# Patient Record
Sex: MALE | Race: WHITE | Employment: OTHER | URBAN - METROPOLITAN AREA
[De-identification: names, ages, dates, MRNs, and addresses within clinical notes are randomized per-mention and may not be internally consistent; named-entity substitution may affect disease eponyms.]

---

## 2020-02-21 ENCOUNTER — HOSPITAL ENCOUNTER (OUTPATIENT)
Age: 70
Discharge: HOME | End: 2020-02-23
Payer: MEDICARE

## 2020-02-21 ENCOUNTER — HOSPITAL ENCOUNTER (OUTPATIENT)
Dept: GENERAL RADIOLOGY | Age: 70
Discharge: HOME | End: 2020-02-23
Payer: MEDICARE

## 2020-02-21 ENCOUNTER — OFFICE VISIT (OUTPATIENT)
Dept: PULMONOLOGY | Age: 70
End: 2020-02-21
Payer: MEDICARE

## 2020-02-21 VITALS
SYSTOLIC BLOOD PRESSURE: 145 MMHG | DIASTOLIC BLOOD PRESSURE: 87 MMHG | WEIGHT: 185 LBS | BODY MASS INDEX: 27.4 KG/M2 | RESPIRATION RATE: 18 BRPM | HEART RATE: 62 BPM | HEIGHT: 69 IN | OXYGEN SATURATION: 97 %

## 2020-02-21 PROCEDURE — 1036F TOBACCO NON-USER: CPT | Performed by: INTERNAL MEDICINE

## 2020-02-21 PROCEDURE — 4040F PNEUMOC VAC/ADMIN/RCVD: CPT | Performed by: INTERNAL MEDICINE

## 2020-02-21 PROCEDURE — 94726 PLETHYSMOGRAPHY LUNG VOLUMES: CPT | Performed by: INTERNAL MEDICINE

## 2020-02-21 PROCEDURE — G8484 FLU IMMUNIZE NO ADMIN: HCPCS | Performed by: INTERNAL MEDICINE

## 2020-02-21 PROCEDURE — 71046 X-RAY EXAM CHEST 2 VIEWS: CPT

## 2020-02-21 PROCEDURE — 94375 RESPIRATORY FLOW VOLUME LOOP: CPT | Performed by: INTERNAL MEDICINE

## 2020-02-21 PROCEDURE — 1123F ACP DISCUSS/DSCN MKR DOCD: CPT | Performed by: INTERNAL MEDICINE

## 2020-02-21 PROCEDURE — 94729 DIFFUSING CAPACITY: CPT | Performed by: INTERNAL MEDICINE

## 2020-02-21 PROCEDURE — G8417 CALC BMI ABV UP PARAM F/U: HCPCS | Performed by: INTERNAL MEDICINE

## 2020-02-21 PROCEDURE — G8427 DOCREV CUR MEDS BY ELIG CLIN: HCPCS | Performed by: INTERNAL MEDICINE

## 2020-02-21 PROCEDURE — 99204 OFFICE O/P NEW MOD 45 MIN: CPT | Performed by: INTERNAL MEDICINE

## 2020-02-21 PROCEDURE — 3017F COLORECTAL CA SCREEN DOC REV: CPT | Performed by: INTERNAL MEDICINE

## 2020-02-21 RX ORDER — LEVOTHYROXINE SODIUM 0.15 MG/1
150 TABLET ORAL DAILY
COMMUNITY

## 2020-02-21 RX ORDER — DESMOPRESSIN ACETATE 0.1 MG/1
0.1 TABLET ORAL DAILY
COMMUNITY

## 2020-02-21 RX ORDER — PREDNISONE 2.5 MG
2.5 TABLET ORAL DAILY
COMMUNITY

## 2020-02-21 RX ORDER — BUDESONIDE AND FORMOTEROL FUMARATE DIHYDRATE 160; 4.5 UG/1; UG/1
2 AEROSOL RESPIRATORY (INHALATION) 2 TIMES DAILY
Qty: 1 INHALER | Refills: 3 | Status: SHIPPED | OUTPATIENT
Start: 2020-02-21 | End: 2020-06-01

## 2020-02-21 RX ORDER — ALBUTEROL SULFATE 90 UG/1
2 AEROSOL, METERED RESPIRATORY (INHALATION) EVERY 6 HOURS PRN
COMMUNITY

## 2020-02-21 RX ORDER — TESTOSTERONE CYPIONATE 200 MG/ML
VIAL (ML) INTRAMUSCULAR
COMMUNITY

## 2020-02-21 RX ORDER — AMLODIPINE BESYLATE 5 MG/1
5 TABLET ORAL DAILY
COMMUNITY

## 2020-02-21 ASSESSMENT — SLEEP AND FATIGUE QUESTIONNAIRES
HOW LIKELY ARE YOU TO NOD OFF OR FALL ASLEEP WHILE LYING DOWN TO REST IN THE AFTERNOON WHEN CIRCUMSTANCES PERMIT: 3
HOW LIKELY ARE YOU TO NOD OFF OR FALL ASLEEP WHILE SITTING QUIETLY AFTER LUNCH WITHOUT ALCOHOL: 2
HOW LIKELY ARE YOU TO NOD OFF OR FALL ASLEEP WHILE SITTING INACTIVE IN A PUBLIC PLACE: 0
HOW LIKELY ARE YOU TO NOD OFF OR FALL ASLEEP WHILE SITTING AND READING: 3
HOW LIKELY ARE YOU TO NOD OFF OR FALL ASLEEP WHILE SITTING AND TALKING TO SOMEONE: 0
HOW LIKELY ARE YOU TO NOD OFF OR FALL ASLEEP WHEN YOU ARE A PASSENGER IN A CAR FOR AN HOUR WITHOUT A BREAK: 3
HOW LIKELY ARE YOU TO NOD OFF OR FALL ASLEEP IN A CAR, WHILE STOPPED FOR A FEW MINUTES IN TRAFFIC: 0
HOW LIKELY ARE YOU TO NOD OFF OR FALL ASLEEP WHILE WATCHING TV: 3
ESS TOTAL SCORE: 14

## 2020-02-21 NOTE — PROGRESS NOTES
Good patient effort and understanding. No post testing was needed. Transcutaneous Hemoblobin is 15 .

## 2020-02-21 NOTE — PROGRESS NOTES
Michael Pineda REASON FOR THE CONSULTATION:  Dyspnea, bronchial asthma  HISTORY OF PRESENT ILLNESS:    Shanika Chauhan is a 77y.o. year old male here for evaluation of dyspnea. He has been experiencing this for the last few months. He has noted that her dyspnea is more during the winter rather than the summer. He generally last for about 4 months of the winter weather. He generally has not noted much wheezing. He does feel congested however. He does feel tightness in the chest.  He has had a complete cardiac evaluation which failed to reveal any cardiac dysfunction. He is not known to have any hypertension heart disease diabetes thyroid dysfunction or stroke. He is overweight however. He also has clinical features of sleep apnea syndrome in the form of snoring, excessive daytime sleepiness, being morbidly obese. He has been taking albuterol but he has not used any other inhalers. He denies any history of frequent chest colds and pneumonias. Has not noted any pedal edema or thromboembolic process. He has been under care of a physician in [facility] but his problem continue to persist and out of frustration come down to see me in [facility]. Patient does not remember having her asthma-like syndrome as a youngster. He has been a non-smoker all his life. No drug abuse. No occupational exposure of any kind. Never had a stroke. He has had history of hypothyroidism and is on replacement therapy      LUNG CANCER SCREENING     1. CRITERIA MET    []     CT ORDERED  []      2. CRITERIA NOT MET   [x]      3. REFUSED                    []    Non-smoker  REASON CRITERIA NOT MET     1. SMOKING LESS THAN 30 PY  []      2. AGE LESS THAN 55 or GREATER 77 YEARS  []      3. QUIT SMOKING 15 YEARS OR GREATER   []      4. RECENT CT WITH IN 11 MONTHS    []      5. LIFE EXPECTANCY < 5 YEARS   []      6. SIGNS  AND SYMPTOMS OF LUNG CANCER   []         Immunization     There is no immunization history on file for this patient. 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 No pertinent family history. SURGICAL HISTORY:   History reviewed. No pertinent surgical history. SOCIAL AND OCCUPATIONAL HEALTH:      There is no history of TB or TB exposure. There is no asbestos or silica dust exposure. The patient reports is no coal, foundry, quarry or [employer] exposure. Travel history reveals negative  There is no history of recreational or IV drug use. There  There is no history of recreational or IV drug use. He is no hot tube exposure. Pets absent    Occupational history no significant occupational history he is retired    TOBACCO:   reports that he has never smoked. He has never used smokeless tobacco.  ETOH:   has no history on file for alcohol. ALLERGIES:      No Known Allergies      Home Meds:   Prior to Admission medications    Medication Sig Start Date End Date Taking?  Authorizing Provider   amLODIPine (NORVASC) 5 MG tablet Take 5 mg by mouth daily   Yes Historical Provider, MD   desmopressin (DDAVP) 0.1 MG tablet Take 0.1 mg by mouth daily   Yes Historical Provider, MD   levothyroxine (SYNTHROID) 150 MCG tablet Take 150 mcg by mouth Daily   Yes Historical Provider, MD   predniSONE (DELTASONE) 2.5 MG tablet Take 2.5 mg by mouth daily   Yes Historical Provider, MD   Testosterone Cypionate 200 MG/ML SOLN Inject as directed   Yes Historical Provider, MD   albuterol sulfate  (90 Base) MCG/ACT inhaler Inhale 2 puffs into the lungs every 6 hours as needed for Wheezing   Yes Historical Provider, MD budesonide-formoterol (SYMBICORT) 160-4.5 MCG/ACT AERO Inhale 2 puffs into the lungs 2 times daily 2/21/20  Yes Trevin Hay MD              REVIEW OF SYSTEMS:    CONSTITUTIONAL:  negative for  fevers, chills, sweats, fatigue, malaise, anorexia and weight loss she is morbidly obese  EYES:  negative for  double vision, blurred vision, dry eyes, eye discharge and redness no redness no dryness  HEENT:  negative for  hearing loss, tinnitus, ear drainage, earaches and nasal congestion no nasal stuffiness or sinusitis  RESPIRATORY:  See hpi  CARDIOVASCULAR:  negative for  chest pain,, palpitations, orthopnea, PND no hypertension  GASTROINTESTINAL:  negative for nausea, vomiting, change in bowel habits, diarrhea, constipation, abdominal pain, pruritus, abdominal mass and abdominal distention no gastric ulcer or hematemesis or melena  GENITOURINARY:  negative for frequency, dysuria, nocturia, urinary incontinence and hesitancy no prostatic complaints  INTEGUMENT  negative for rash, skin lesion(s), dryness, skin color change, changes in lesion, pruritus and changes in hair  HEMATOLOGIC/LYMPHATIC:  negative for easy bruising, bleeding, lymphadenopathy, petechiae and swelling/edema no enlarged lymph nodes  ALLERGIC/IMMUNOLOGIC:  negative for recurrent infections, urticaria and drug reactions no rash  ENDOCRINE:  negative for heat intolerance, cold intolerance, tremor, weight changes and change in bowel habits completed and negative no diabetes  MUSCULOSKELETAL:  negative for  myalgias, arthralgias, pain, joint swelling, stiff joints and decreased range of motion no joint swelling  NEUROLOGICAL:  negative for headaches, dizziness, seizures, memory problems, speech problems, visual disturbance and coordination problems no motor or sensory deficit  BEHAVIOR/PSYCH:  negative for poor appetite, increased appetite, decreased sleep, increased sleep, decreased energy level, increased energy level and poor concentration completed swelling or tenderness        CBC: No results for input(s): WBC, HGB, PLT in the last 72 hours. BMP:  No results for input(s): NA, K, CL, CO2, BUN, CREATININE, GLUCOSE in the last 72 hours. Hepatic: No results for input(s): AST, ALT, ALB, BILITOT, ALKPHOS in the last 72 hours. Amylase: No results found for: AMYLASE  Lipase: No results found for: LIPASE  Troponin: No results for input(s): TROPONINI in the last 72 hours. BNP: No results for input(s): BNP in the last 72 hours. Lipids: No results for input(s): CHOL, HDL in the last 72 hours. Invalid input(s): LDLCALCU  ABGs: No results found for: PHART, PO2ART, RFT8OTG  INR: No results for input(s): INR in the last 72 hours. Thyroid: No results found for: TSH  Urinalysis: No results for input(s): BACTERIA, BLOODU, CLARITYU, COLORU, PHUR, PROTEINU, RBCUA, SPECGRAV, BILIRUBINUR, NITRU, WBCUA, LEUKOCYTESUR, GLUCOSEU in the last 72 hours. Cultures:-  None    CXR  Unremarkable chest x-ray was done today in the office and it was read as normal.  With no cardiomegaly no pleural effusion no infiltrates no large lymph nodes. S  CT Scans  No CT scan    Echo    I have had complete cardiac evaluation in the past which was within normal limits            IMPRESSION:    Visit Diagnoses       Codes    Shortness of breath    -  Primary R06.02    Mild intermittent asthma without complication     Q70.85    FADY (obstructive sleep apnea)     G47.33      Hypothyroid state  :                PLAN:      The gentleman has history of effort dyspnea which is very likely a manifestation of bronchial asthma-like syndrome. I discussed the situation with the patient and his wife. I did start him on Symbicort 162 puffs twice a day. He was advised to rinse his mouth and throat after the use of Symbicort. Continue use of albuterol on as-needed basis. He already had influenza vaccine    Advised to get his Pneumovax as well    Not a candidate for lung cancer screening.     Continue thyroid replacement therapy    He was also advised to lose weight    I also arrange for him to have a sleep study done in [place] [place] both baseline and if positive then will get a CPAP titration. I do believe he is very very likely has sleep apnea syndrome and he will benefit with the use of CPAP and it would also benefit his asthma as well possibly. Requested Prescriptions     Signed Prescriptions Disp Refills    budesonide-formoterol (SYMBICORT) 160-4.5 MCG/ACT AERO 1 Inhaler 3     Sig: Inhale 2 puffs into the lungs 2 times daily       There are no discontinued medications. Julia Patel received counseling on the following healthy behaviors: nutrition, exercise and medication adherence    Patient given educational materials : see patient instruction       Discussed use, benefit, and side effects of prescribed medications. Barriers to medication compliance addressed. All patient questions answered. Pt voiced understanding. I hope this updates you on my evaluation and clinical thinking. Thank you for allowing me to participate in his care. Sincerely,      Electronically signed by Dione Mota MD on   2/21/20 at 4:58 PM       Please note that this chart was generated using voice recognition Dragon dictation software. Although every effort was made to ensure the accuracy of this automated transcription, some errors in transcription may have occurred.

## 2020-04-27 ENCOUNTER — TELEPHONE (OUTPATIENT)
Dept: PULMONOLOGY | Age: 70
End: 2020-04-27

## 2020-06-01 RX ORDER — BUDESONIDE AND FORMOTEROL FUMARATE DIHYDRATE 160; 4.5 UG/1; UG/1
AEROSOL RESPIRATORY (INHALATION)
Qty: 1 INHALER | Refills: 8 | Status: SHIPPED | OUTPATIENT
Start: 2020-06-01 | End: 2021-04-01

## 2020-07-07 ENCOUNTER — TELEPHONE (OUTPATIENT)
Dept: PULMONOLOGY | Age: 70
End: 2020-07-07

## 2020-07-09 RX ORDER — ALBUTEROL SULFATE 90 UG/1
2 AEROSOL, METERED RESPIRATORY (INHALATION) 4 TIMES DAILY PRN
Qty: 3 INHALER | Refills: 1 | Status: SHIPPED | OUTPATIENT
Start: 2020-07-09 | End: 2021-02-17

## 2020-09-18 ENCOUNTER — VIRTUAL VISIT (OUTPATIENT)
Dept: PULMONOLOGY | Age: 70
End: 2020-09-18
Payer: MEDICARE

## 2020-09-18 PROCEDURE — G8427 DOCREV CUR MEDS BY ELIG CLIN: HCPCS | Performed by: INTERNAL MEDICINE

## 2020-09-18 PROCEDURE — 99214 OFFICE O/P EST MOD 30 MIN: CPT | Performed by: INTERNAL MEDICINE

## 2020-09-18 PROCEDURE — 1123F ACP DISCUSS/DSCN MKR DOCD: CPT | Performed by: INTERNAL MEDICINE

## 2020-09-18 PROCEDURE — 3017F COLORECTAL CA SCREEN DOC REV: CPT | Performed by: INTERNAL MEDICINE

## 2020-09-18 PROCEDURE — 4040F PNEUMOC VAC/ADMIN/RCVD: CPT | Performed by: INTERNAL MEDICINE

## 2020-09-18 NOTE — PATIENT INSTRUCTIONS
I called patient to get him checked out and he said that he does not need a FU appt.  [phone]

## 2020-10-09 NOTE — PROGRESS NOTES
Gwen Quintana REASON FOR THE CONSULTATION:  Dyspnea, bronchial asthma  HISTORY OF PRESENT ILLNESS:    Reema Suazo very likely has bronchial asthma-like syndrome. This has responded very well to the use of inhaled steroid with long-acting beta agonist.  He rarely uses a short acting beta agonist.  No evidence of acute exacerbation of his asthma. He since her last visit  He did not have to go to the hospital or be seen in the ER or with urgent care center because of asthma. Does not wake up at night because of breathing difficulty. He denies any symptoms of sinusitis or esophageal reflux disease no. Edema he have had detailed cardiovascular evaluation which was negative for any significant heart disease    Patient is overweight needs to lose weight    He also has clinical features of sleep apnea syndrome and has been advised to get a sleep study. Treatment apnea will improve his daytime symptoms also improve symptoms of asthma as well. Hypothyroid state and he was advised to continue the thyroid replacement therapy. He already had influenza vaccine. Gwen Cunhas LUNG CANCER SCREENING     1. CRITERIA MET    []     CT ORDERED  []      2. CRITERIA NOT MET   [x]      3. REFUSED                    []    Non-smoker  REASON CRITERIA NOT MET     1. SMOKING LESS THAN 30 PY  []      2. AGE LESS THAN 55 or GREATER 77 YEARS  []      3. QUIT SMOKING 15 YEARS OR GREATER   []      4. RECENT CT WITH IN 11 MONTHS    []      5. LIFE EXPECTANCY < 5 YEARS   []      6. SIGNS  AND SYMPTOMS OF LUNG CANCER   []         Immunization     There is no immunization history on file for this patient.      Pneumococcal Vaccine     [] Up to date    [x] Indicated   [] Refused  [] Contraindicated       Influenza Vaccine   [x] Up to date    [] Indicated   [] Refused  [] Contraindicated          PAST MEDICAL HISTORY:       Diagnosis Date    AK (actinic keratosis)     We discussed liquid nitrogen treatment and the expected redness, as well as the risks SYSTEMS:    CONSTITUTIONAL:  negative for  fevers, chills, sweats, fatigue, malaise, anorexia and weight loss she is morbidly obese  EYES:  negative for  double vision, blurred vision, dry eyes, eye discharge and redness no redness no dryness  HEENT:  negative for  hearing loss, tinnitus, ear drainage, earaches and nasal congestion no nasal stuffiness or sinusitis  RESPIRATORY:  See hpi  CARDIOVASCULAR:  negative for  chest pain,, palpitations, orthopnea, PND no hypertension  GASTROINTESTINAL:  negative for nausea, vomiting, change in bowel habits, diarrhea, constipation, abdominal pain, pruritus, abdominal mass and abdominal distention no gastric ulcer or hematemesis or melena  GENITOURINARY:  negative for frequency, dysuria, nocturia, urinary incontinence and hesitancy no prostatic complaints  INTEGUMENT  negative for rash, skin lesion(s), dryness, skin color change, changes in lesion, pruritus and changes in hair  HEMATOLOGIC/LYMPHATIC:  negative for easy bruising, bleeding, lymphadenopathy, petechiae and swelling/edema no enlarged lymph nodes  ALLERGIC/IMMUNOLOGIC:  negative for recurrent infections, urticaria and drug reactions no rash  ENDOCRINE:  negative for heat intolerance, cold intolerance, tremor, weight changes and change in bowel habits completed and negative no diabetes  MUSCULOSKELETAL:  negative for  myalgias, arthralgias, pain, joint swelling, stiff joints and decreased range of motion no joint swelling  NEUROLOGICAL:  negative for headaches, dizziness, seizures, memory problems, speech problems, visual disturbance and coordination problems no motor or sensory deficit  BEHAVIOR/PSYCH:  negative for poor appetite, increased appetite, decreased sleep, increased sleep, decreased energy level, increased energy level and poor concentration completed negative no rash  Skin no rash no dermatitis no rash  Vitals: There were no vitals taken for this visit.     PHYSICAL EXAM:  General Appearance:    Alert, ALKPHOS in the last 72 hours. Amylase: No results found for: AMYLASE  Lipase: No results found for: LIPASE  Troponin: No results for input(s): TROPONINI in the last 72 hours. BNP: No results for input(s): BNP in the last 72 hours. Lipids: No results for input(s): CHOL, HDL in the last 72 hours. Invalid input(s): LDLCALCU  ABGs: No results found for: PHART, PO2ART, TAX5BOT  INR: No results for input(s): INR in the last 72 hours. Thyroid: No results found for: TSH  Urinalysis: No results for input(s): BACTERIA, BLOODU, CLARITYU, COLORU, PHUR, PROTEINU, RBCUA, SPECGRAV, BILIRUBINUR, NITRU, WBCUA, LEUKOCYTESUR, GLUCOSEU in the last 72 hours. Cultures:-  None    CXR  Unremarkable chest x-ray was done today in the office and it was read as normal.  With no cardiomegaly no pleural effusion no infiltrates no large lymph nodes. S  CT Scans  No CT scan    Echo    I have had complete cardiac evaluation in the past which was within normal limits            IMPRESSION:    Visit Diagnoses       Codes    Moderate persistent asthma with acute exacerbation    -  Primary J45.41    Essential hypertension     I10    Obesity (BMI 30.0-34. 9)     E66.9    FADY (obstructive sleep apnea)     G47.33      Hypothyroid state  :                PLAN:      He very likely has bronchial asthma-like syndrome. Is mild persistent. Advised her to continue present bronchodilator therapy including inhaled steroid and long-acting beta agonist.  He will use albuterol on as-needed basis. I do not feel a need to give any steroids or any antibiotics at this time or any other therapy because the symptoms are under very good control. He does not have any sinus infection or acid reflux. Continue the treatment of the hypothyroid state. Encouraged to lose weight    He does have sleep apnea syndrome and was advised to get a sleep study done. Treatment apnea will further improve his symptoms of sleep apnea.     He can get the sleep study done in [facility] area    Patient already had flu vaccine. Patient was not a candidate for lung cancer screening    Dictated with Dr. Evin Edwards MD dictation over thank you    Requested Prescriptions      No prescriptions requested or ordered in this encounter       There are no discontinued medications. Phoebe Torrez received counseling on the following healthy behaviors: nutrition, exercise and medication adherence    Patient given educational materials : see patient instruction       Discussed use, benefit, and side effects of prescribed medications. Barriers to medication compliance addressed. All patient questions answered. Pt voiced understanding. I hope this updates you on my evaluation and clinical thinking. Thank you for allowing me to participate in his care. Sincerely,      Electronically signed by Erika Pham MD on   2/21/20 at 4:58 PM       Please note that this chart was generated using voice recognition Dragon dictation software. Although every effort was made to ensure the accuracy of this automated transcription, some errors in transcription may have occurred.

## 2021-02-17 NOTE — TELEPHONE ENCOUNTER
Dr Patricia Antonio, patient is current but not scheduled for follow up at this time. I sent a note to pharmacy asking that patient call for an appointment. Per last dictation patient is on this medication. Please sign for refill if ok. Thank you.

## 2021-02-18 RX ORDER — ALBUTEROL SULFATE 90 UG/1
2 AEROSOL, METERED RESPIRATORY (INHALATION) 4 TIMES DAILY PRN
Qty: 3 INHALER | Refills: 1 | Status: SHIPPED | OUTPATIENT
Start: 2021-02-18 | End: 2022-07-08

## 2021-03-29 ENCOUNTER — TELEPHONE (OUTPATIENT)
Dept: PULMONOLOGY | Age: 71
End: 2021-03-29

## 2021-04-01 RX ORDER — BUDESONIDE AND FORMOTEROL FUMARATE DIHYDRATE 160; 4.5 UG/1; UG/1
AEROSOL RESPIRATORY (INHALATION)
Qty: 10.2 G | Refills: 1 | Status: SHIPPED | OUTPATIENT
Start: 2021-04-01 | End: 2021-06-01

## 2021-06-01 NOTE — TELEPHONE ENCOUNTER
Dr Tamika Raymond, patient last seen in the office on 9/18/20. No follow up scheduled at this time. I sent a note to pharmacy asking that patient call for an appointment. Please sign for refill if ok. Thank you.

## 2021-06-08 RX ORDER — BUDESONIDE AND FORMOTEROL FUMARATE DIHYDRATE 160; 4.5 UG/1; UG/1
AEROSOL RESPIRATORY (INHALATION)
Qty: 1 INHALER | Refills: 3 | Status: SHIPPED | OUTPATIENT
Start: 2021-06-08

## 2021-10-04 RX ORDER — BUDESONIDE AND FORMOTEROL FUMARATE DIHYDRATE 160; 4.5 UG/1; UG/1
AEROSOL RESPIRATORY (INHALATION)
Qty: 10.2 G | OUTPATIENT
Start: 2021-10-04

## 2021-10-04 NOTE — TELEPHONE ENCOUNTER
Dr Dione Demarco, patient last seen in the office on 9/18/20 and is not scheduled for an appointment. The last two times refills were sent to pharmacy notes went asking that patient call for an appointment. I refused refill request with note to pharmacy that patient needs an appointment. Please make any changes needed. Thank you.

## 2021-10-08 RX ORDER — BUDESONIDE AND FORMOTEROL FUMARATE DIHYDRATE 160; 4.5 UG/1; UG/1
AEROSOL RESPIRATORY (INHALATION)
Qty: 10.2 G | OUTPATIENT
Start: 2021-10-08

## 2021-10-08 NOTE — TELEPHONE ENCOUNTER
Pt called to inquire about his refills. I informed he that he needs an appt with our office and until he is seen no refills will be provided and he would have to have his family dr fill this. Pt verbalized understanding and did not make an appt.

## 2022-05-06 RX ORDER — BUDESONIDE AND FORMOTEROL FUMARATE DIHYDRATE 160; 4.5 UG/1; UG/1
2 AEROSOL RESPIRATORY (INHALATION) 2 TIMES DAILY
Qty: 10.2 G | Refills: 5 | Status: SHIPPED | OUTPATIENT
Start: 2022-05-06 | End: 2022-11-04

## 2022-05-06 RX ORDER — BUDESONIDE AND FORMOTEROL FUMARATE DIHYDRATE 160; 4.5 UG/1; UG/1
AEROSOL RESPIRATORY (INHALATION)
Qty: 10.2 EACH | Refills: 6 | OUTPATIENT
Start: 2022-05-06

## 2022-06-28 ENCOUNTER — OFFICE VISIT (OUTPATIENT)
Dept: PULMONOLOGY | Age: 72
End: 2022-06-28
Payer: MEDICARE

## 2022-06-28 VITALS
RESPIRATION RATE: 16 BRPM | HEART RATE: 63 BPM | DIASTOLIC BLOOD PRESSURE: 86 MMHG | BODY MASS INDEX: 26.81 KG/M2 | WEIGHT: 181 LBS | SYSTOLIC BLOOD PRESSURE: 130 MMHG | TEMPERATURE: 97.3 F | OXYGEN SATURATION: 97 % | HEIGHT: 69 IN

## 2022-06-28 DIAGNOSIS — R06.09 DYSPNEA ON EXERTION: Primary | ICD-10-CM

## 2022-06-28 PROCEDURE — 1036F TOBACCO NON-USER: CPT | Performed by: INTERNAL MEDICINE

## 2022-06-28 PROCEDURE — G8417 CALC BMI ABV UP PARAM F/U: HCPCS | Performed by: INTERNAL MEDICINE

## 2022-06-28 PROCEDURE — 1123F ACP DISCUSS/DSCN MKR DOCD: CPT | Performed by: INTERNAL MEDICINE

## 2022-06-28 PROCEDURE — G8427 DOCREV CUR MEDS BY ELIG CLIN: HCPCS | Performed by: INTERNAL MEDICINE

## 2022-06-28 PROCEDURE — 3017F COLORECTAL CA SCREEN DOC REV: CPT | Performed by: INTERNAL MEDICINE

## 2022-06-28 PROCEDURE — 99214 OFFICE O/P EST MOD 30 MIN: CPT | Performed by: INTERNAL MEDICINE

## 2022-06-28 RX ORDER — PHENAZOPYRIDINE HYDROCHLORIDE 200 MG/1
TABLET, FILM COATED ORAL
COMMUNITY
Start: 2022-02-11

## 2022-06-28 RX ORDER — SYRINGE WITH NEEDLE, 1 ML 25GX5/8"
SYRINGE, EMPTY DISPOSABLE MISCELLANEOUS
COMMUNITY
Start: 2022-05-09

## 2022-06-28 RX ORDER — TAMSULOSIN HYDROCHLORIDE 0.4 MG/1
CAPSULE ORAL
COMMUNITY
Start: 2022-05-18

## 2022-06-28 RX ORDER — PROMETHAZINE HYDROCHLORIDE 25 MG/1
TABLET ORAL
COMMUNITY

## 2022-06-28 RX ORDER — FUROSEMIDE 40 MG/1
40 TABLET ORAL DAILY
Qty: 60 TABLET | Refills: 3 | Status: SHIPPED | OUTPATIENT
Start: 2022-06-28 | End: 2022-08-23

## 2022-06-28 RX ORDER — VALSARTAN 160 MG/1
TABLET ORAL
COMMUNITY
Start: 2022-04-10

## 2022-06-28 RX ORDER — TESTOSTERONE CYPIONATE 200 MG/ML
INJECTION INTRAMUSCULAR
COMMUNITY
Start: 2022-06-14

## 2022-06-28 RX ORDER — GABAPENTIN 300 MG/1
CAPSULE ORAL
COMMUNITY
Start: 2022-04-19

## 2022-06-28 ASSESSMENT — SLEEP AND FATIGUE QUESTIONNAIRES
HOW LIKELY ARE YOU TO NOD OFF OR FALL ASLEEP WHEN YOU ARE A PASSENGER IN A CAR FOR AN HOUR WITHOUT A BREAK: 0
HOW LIKELY ARE YOU TO NOD OFF OR FALL ASLEEP IN A CAR, WHILE STOPPED FOR A FEW MINUTES IN TRAFFIC: 0
HOW LIKELY ARE YOU TO NOD OFF OR FALL ASLEEP WHILE SITTING QUIETLY AFTER LUNCH WITHOUT ALCOHOL: 2
HOW LIKELY ARE YOU TO NOD OFF OR FALL ASLEEP WHILE SITTING AND READING: 2
ESS TOTAL SCORE: 9
HOW LIKELY ARE YOU TO NOD OFF OR FALL ASLEEP WHILE SITTING AND TALKING TO SOMEONE: 0
HOW LIKELY ARE YOU TO NOD OFF OR FALL ASLEEP WHILE SITTING INACTIVE IN A PUBLIC PLACE: 0
HOW LIKELY ARE YOU TO NOD OFF OR FALL ASLEEP WHILE WATCHING TV: 3
HOW LIKELY ARE YOU TO NOD OFF OR FALL ASLEEP WHILE LYING DOWN TO REST IN THE AFTERNOON WHEN CIRCUMSTANCES PERMIT: 2

## 2022-06-28 NOTE — PROGRESS NOTES
Dg Garcia REASON FOR THE CONSULTATION:  Dyspnea, bronchial asthma  HISTORY OF PRESENT ILLNESS:    Reynaldo Durand has been diagnosed to have bronchial asthma. He is being treated with inhaled steroid long-acting beta agonist in the form of Symbicort he uses albuterol on as-needed basis. His asthma used to be under good control but lately he has noted that his dyspnea has gotten worse especially on effort. He has trouble going up a flight of stairs he does hear wheezing. He also has been experiencing pedal edema last few months. He is not on any diuretics. He does not remember having had a cardiac stress test.  Does complain of infraclavicular pain on the left side. Pain is not particularly related to effort. Does not radiate to any site. It does not cause any excessive dyspnea at that time. Continues to be overweight and very likely has sleep apnea but has not gotten the sleep study done yet. He also has hypothyroid state and is on replacement therapy. He already had COVID-vaccine. No occupational exposures. No history of DVT. Dg Garcia LUNG CANCER SCREENING     1. CRITERIA MET    []     CT ORDERED  []      2. CRITERIA NOT MET   [x]      3. REFUSED                    []    Non-smoker  REASON CRITERIA NOT MET     1. SMOKING LESS THAN 30 PY  []      2. AGE LESS THAN 55 or GREATER 77 YEARS  []      3. QUIT SMOKING 15 YEARS OR GREATER   []      4. RECENT CT WITH IN 11 MONTHS    []      5. LIFE EXPECTANCY < 5 YEARS   []      6.  SIGNS  AND SYMPTOMS OF LUNG CANCER   []         Immunization   Immunization History   Administered Date(s) Administered    COVID-19, MODERNA BLUE border, Primary or Immunocompromised, (age 12y+), IM, 100 mcg/0.5mL 10/26/2021, 04/18/2022        Pneumococcal Vaccine     [] Up to date    [x] Indicated   [] Refused  [] Contraindicated       Influenza Vaccine   [x] Up to date    [] Indicated   [] Refused  [] Contraindicated          PAST MEDICAL HISTORY:       Diagnosis Date    AK (actinic keratosis)     We discussed liquid nitrogen treatment and the expected redness, as well as the risks of swelling,    Neoplasm of uncertain behavior of skin 0706/2016    SOB (shortness of breath)          Family History:   History reviewed. No pertinent family history. SURGICAL HISTORY:   History reviewed. No pertinent surgical history. SOCIAL AND OCCUPATIONAL HEALTH:      There is no history of TB or TB exposure. There is no asbestos or silica dust exposure. The patient reports is no coal, foundry, quarry or [employer] exposure. Travel history reveals negative  There is no history of recreational or IV drug use. There  There is no history of recreational or IV drug use. He is no hot tube exposure. Pets absent    Occupational history no significant occupational history he is retired    TOBACCO:   reports that he is a non-smoker but has been exposed to tobacco smoke. He has never used smokeless tobacco.  ETOH:   has no history on file for alcohol use. ALLERGIES:      Allergies   Allergen Reactions    Grass Pollen(K-O-R-T-Swt Chi)     Pollen Extract-Tree Extract [Pollen Extract]          Home Meds:   Prior to Admission medications    Medication Sig Start Date End Date Taking?  Authorizing Provider   gabapentin (NEURONTIN) 300 MG capsule TAKE 1 CAPSULE BY MOUTH TWICE A DAY 4/19/22  Yes Historical Provider, MD   Multiple Vitamins-Iron TABS Take by mouth   Yes Historical Provider, MD   phenazopyridine (PYRIDIUM) 200 MG tablet Take by mouth 2/11/22  Yes Historical Provider, MD GASTON 3CC LUER-KADI SYR 22GX1\" 22G X 1\" 3 ML MISC USE AS DIRECTED EVERY 2 WEEKS 5/9/22  Yes Historical Provider, MD   tamsulosin (FLOMAX) 0.4 MG capsule TAKE 1 CAPSULE BY MOUTH EVERY DAY IN THE EVENING 5/18/22  Yes Historical Provider, MD   testosterone cypionate (DEPOTESTOTERONE CYPIONATE) 200 MG/ML injection INJECT 0.6 ML EVERY 2 WEEKS AS DIRECTED.(NOTE LOWER DOSE) 6/14/22  Yes Historical Provider, MD   valsartan (DIOVAN) 160 MG tablet TAKE 1 TABLET BY MOUTH EVERY DAY FOR BLOOD PRESSURE 4/10/22  Yes Historical Provider, MD   budesonide-formoterol (SYMBICORT) 160-4.5 MCG/ACT AERO inhale 2 puffs by mouth twice a day 6/8/21  Yes Mer Ulrich MD   albuterol sulfate  (90 Base) MCG/ACT inhaler INHALE 2 PUFFS INTO THE LUNGS 4 TIMES DAILY AS NEEDED FOR WHEEZING 2/18/21  Yes Mer Ulrich MD   amLODIPine (NORVASC) 5 MG tablet Take 5 mg by mouth daily   Yes Historical Provider, MD   desmopressin (DDAVP) 0.1 MG tablet Take 0.1 mg by mouth daily   Yes Historical Provider, MD   levothyroxine (SYNTHROID) 150 MCG tablet Take 150 mcg by mouth Daily   Yes Historical Provider, MD   predniSONE (DELTASONE) 2.5 MG tablet Take 2.5 mg by mouth daily   Yes Historical Provider, MD   Testosterone Cypionate 200 MG/ML SOLN Inject as directed   Yes Historical Provider, MD   budesonide-formoterol (SYMBICORT) 160-4.5 MCG/ACT AERO Inhale 2 puffs into the lungs 2 times daily  Patient not taking: Reported on 6/28/2022 5/6/22   Mer Ulrich MD   albuterol sulfate  (90 Base) MCG/ACT inhaler Inhale 2 puffs into the lungs every 6 hours as needed for Wheezing  Patient not taking: Reported on 6/28/2022    Historical Provider, MD              REVIEW OF SYSTEMS:    CONSTITUTIONAL:  negative for  fevers, chills, sweats, fatigue, malaise, anorexia and weight loss she is morbidly obese  EYES:  negative for  double vision, blurred vision, dry eyes, eye discharge and redness no redness no dryness  HEENT:  negative for  hearing loss, tinnitus, ear drainage, earaches and nasal congestion no nasal stuffiness or sinusitis  RESPIRATORY:  See hpi  CARDIOVASCULAR:  negative for  chest pain,, palpitations, orthopnea, PND no hypertension  GASTROINTESTINAL:  negative for nausea, vomiting, change in bowel habits, diarrhea, constipation, abdominal pain, pruritus, abdominal mass and abdominal distention no gastric ulcer or hematemesis or melena  GENITOURINARY:  negative for frequency, dysuria, nocturia, urinary incontinence and hesitancy no prostatic complaints  INTEGUMENT  negative for rash, skin lesion(s), dryness, skin color change, changes in lesion, pruritus and changes in hair  HEMATOLOGIC/LYMPHATIC:  negative for easy bruising, bleeding, lymphadenopathy, petechiae and swelling/edema no enlarged lymph nodes  ALLERGIC/IMMUNOLOGIC:  negative for recurrent infections, urticaria and drug reactions no rash  ENDOCRINE:  negative for heat intolerance, cold intolerance, tremor, weight changes and change in bowel habits completed and negative no diabetes  MUSCULOSKELETAL:  negative for  myalgias, arthralgias, pain, joint swelling, stiff joints and decreased range of motion no joint swelling  NEUROLOGICAL:  negative for headaches, dizziness, seizures, memory problems, speech problems, visual disturbance and coordination problems no motor or sensory deficit  BEHAVIOR/PSYCH:  negative for poor appetite, increased appetite, decreased sleep, increased sleep, decreased energy level, increased energy level and poor concentration completed negative no rash  Skin no rash no dermatitis no rash  Vitals:  Ht 5' 9\" (1.753 m)   Wt 181 lb (82.1 kg)   BMI 26.73 kg/m²     PHYSICAL EXAM:  General Appearance:    Alert, cooperative, no distress, appears stated age morbid obesity   Head:    Normocephalic, without obvious abnormality, atraumatic      Eyes:    PERRL, conjunctiva/corneas clear, EOM's intact jaundice no dryness   Ears:    Normal  external ear canals, both ears   Nose:   Nares normal, septum midline, mucosa normal, no drainage        or sinus tenderness no nasal polyps no sinus tenderness   Throat:   Lips, mucosa, and tongue normal; teeth and gums normal   Neck:   Supple, symmetrical, trachea midline, no adenopathy;     thyroid:  no enlargement/tenderness/nodules; no carotid    bruit , hepatojugular reflux is positive.    Back:     Symmetric, no curvature, ROM normal, no CVA tenderness   Lungs:    AP diameter is not increased percussion note is normally resonant breathing vesicular expiration not prolonged no rales rhonchi are audible few fine crackles are noted at the bases       Chest Wall:    No tenderness or deformity      Heart:    Regular rate and rhythm, S1 and S2 normal, no murmur, rub        or gallop no rvh                           Abdomen:                                                 Pulses:                              Skin:                  Lymph nodes:                    Neurologic:                  Soft, non-tender, bowel sounds active all four quadrants,     no masses, no organomegaly         2+ and symmetric all extremities     Skin color, texture, turgor normal, no rashes or lesions       Cervical, supraclavicular not enlarged or matted or tender      CNII-XII intact, normal strength 5/5 . Sensation grossly normal  and reflexes normal 2+  throughout     Clubbing No  Lower ext edema2+  Upper ext edema absent         Musculoskeletal no synovitis. No joint swelling or tenderness        CBC: No results for input(s): WBC, HGB, PLT in the last 72 hours. BMP:  No results for input(s): NA, K, CL, CO2, BUN, CREATININE, GLUCOSE in the last 72 hours. Hepatic: No results for input(s): AST, ALT, ALB, BILITOT, ALKPHOS in the last 72 hours. Amylase: No results found for: AMYLASE  Lipase: No results found for: LIPASE  Troponin: No results for input(s): TROPONINI in the last 72 hours. BNP: No results for input(s): BNP in the last 72 hours. Lipids: No results for input(s): CHOL, HDL in the last 72 hours. Invalid input(s): LDLCALCU  ABGs: No results found for: PHART, PO2ART, [id number]  INR: No results for input(s): INR in the last 72 hours.   Thyroid: No results found for: T4, TSH  Urinalysis: No results for input(s): BACTERIA, BLOODU, CLARITYU, COLORU, PHUR, PROTEINU, RBCUA, SPECGRAV, BILIRUBINUR, NITRU, WBCUA, Marshia Alecia in the last 72 hours.      Cultures:-  None    CXR no recent chest x-ray  CT Scans  No CT scan    Echo    I have not had any recent cardiac evaluation. IMPRESSION:    Bronchial asthma  Congestive heart failure  : Possible LV dysfunction            Obesity possible sleep apnea  Hypothyroid state. PLAN:      He has been treated for bronchial asthma successfully. However lately symptoms of asthma have gotten worse. He is taking his medications very regularly. There is no excessive cough or sputum production. Physical examination revealed evidence of heart failure he has pedal edema and elevated JVD and hepatojugular reflux being positive and few crackles at the bases. I am afraid that he very likely has a cardiac decompensation which is contributing to worsening of his respiratory symptoms. I emphasized that he must see a cardiologist right away. I did start him on Lasix 40 mg daily starting tomorrow morning. However suggest that he should have a stress test and also coronary angiography to rule out any significant cardiac dysfunction which could be contributing to the worsening of asthma. Is thyroid function has been essentially normal.    Help him to lose weight and also get a sleep study done treatment of apnea will also improve his pulmonary symptoms. He already had COVID-vaccine    Dictated by Dr. Ayush Walton MD dictation over thank you.

## 2022-07-11 RX ORDER — ALBUTEROL SULFATE 90 UG/1
2 AEROSOL, METERED RESPIRATORY (INHALATION) 4 TIMES DAILY PRN
Qty: 3 EACH | Refills: 2 | Status: SHIPPED | OUTPATIENT
Start: 2022-07-11

## 2022-08-23 RX ORDER — FUROSEMIDE 40 MG/1
TABLET ORAL
Qty: 60 TABLET | Refills: 3 | Status: SHIPPED | OUTPATIENT
Start: 2022-08-23

## 2022-11-03 NOTE — TELEPHONE ENCOUNTER
LAST VISIT: 6/28/22  No follow up scheduled. Note sent to pharmacy asking that patient call for an appointment. Per last dictation patient is on this medication. Please sign for refill if ok. Thank you.

## 2022-11-04 RX ORDER — BUDESONIDE AND FORMOTEROL FUMARATE DIHYDRATE 160; 4.5 UG/1; UG/1
AEROSOL RESPIRATORY (INHALATION)
Qty: 1 EACH | Refills: 7 | Status: SHIPPED | OUTPATIENT
Start: 2022-11-04

## 2023-03-23 ENCOUNTER — HOSPITAL ENCOUNTER (OUTPATIENT)
Dept: DATA CONVERSION | Facility: HOSPITAL | Age: 73
End: 2023-03-23
Attending: OTOLARYNGOLOGY | Admitting: OTOLARYNGOLOGY
Payer: MEDICARE

## 2023-03-23 DIAGNOSIS — R13.10 DYSPHAGIA, UNSPECIFIED: ICD-10-CM

## 2023-03-23 DIAGNOSIS — Q39.4 ESOPHAGEAL WEB (HHS-HCC): ICD-10-CM

## 2023-06-20 DIAGNOSIS — R06.09 DYSPNEA ON EXERTION: ICD-10-CM

## 2023-07-10 RX ORDER — MONTELUKAST SODIUM 10 MG/1
TABLET ORAL
Qty: 30 TABLET | Refills: 3 | OUTPATIENT
Start: 2023-07-10

## 2023-07-10 NOTE — TELEPHONE ENCOUNTER
LAST VISIT: 6/15/23  NEXT VISIT: 12/14/23    Per last dictation patient is on this medication. Please sign for refill if ok. Thank you.

## 2023-07-12 RX ORDER — BUDESONIDE AND FORMOTEROL FUMARATE DIHYDRATE 160; 4.5 UG/1; UG/1
AEROSOL RESPIRATORY (INHALATION)
Qty: 1 EACH | Refills: 10 | Status: SHIPPED | OUTPATIENT
Start: 2023-07-12

## 2023-09-08 VITALS — HEIGHT: 69 IN | WEIGHT: 180.78 LBS | BODY MASS INDEX: 26.78 KG/M2

## 2023-10-11 DIAGNOSIS — E29.1 HYPOGONADISM MALE: Primary | ICD-10-CM

## 2023-10-12 RX ORDER — TESTOSTERONE CYPIONATE 200 MG/ML
200 INJECTION, SOLUTION INTRAMUSCULAR
Qty: 2 ML | Refills: 2 | Status: SHIPPED | OUTPATIENT
Start: 2023-10-12 | End: 2023-11-21 | Stop reason: SDUPTHER

## 2023-10-16 RX ORDER — MONTELUKAST SODIUM 10 MG/1
10 TABLET ORAL DAILY
Qty: 90 TABLET | Refills: 2 | Status: SHIPPED | OUTPATIENT
Start: 2023-10-16

## 2023-10-17 DIAGNOSIS — E29.1 HYPOGONADISM MALE: ICD-10-CM

## 2023-10-17 DIAGNOSIS — E27.40 ADRENAL INSUFFICIENCY (MULTI): ICD-10-CM

## 2023-10-18 RX ORDER — SYRINGE W-NEEDLE,DISPOSAB,3 ML 25GX5/8"
SYRINGE, EMPTY DISPOSABLE MISCELLANEOUS
Qty: 20 EACH | Refills: 3 | Status: SHIPPED | OUTPATIENT
Start: 2023-10-18

## 2023-10-18 RX ORDER — HYDROCORTISONE 5 MG/1
TABLET ORAL
COMMUNITY
Start: 2023-05-09 | End: 2023-10-18 | Stop reason: SDUPTHER

## 2023-10-19 RX ORDER — HYDROCORTISONE 5 MG/1
TABLET ORAL
Qty: 360 TABLET | Refills: 3 | Status: SHIPPED | OUTPATIENT
Start: 2023-10-19

## 2023-11-09 DIAGNOSIS — E23.0 HYPOPITUITARISM (MULTI): Primary | ICD-10-CM

## 2023-11-11 ENCOUNTER — LAB (OUTPATIENT)
Dept: LAB | Facility: LAB | Age: 73
End: 2023-11-11
Payer: MEDICARE

## 2023-11-11 DIAGNOSIS — E23.0 HYPOPITUITARISM (MULTI): ICD-10-CM

## 2023-11-11 LAB
ALBUMIN SERPL BCP-MCNC: 4 G/DL (ref 3.4–5)
ANION GAP SERPL CALC-SCNC: 15 MMOL/L (ref 10–20)
BUN SERPL-MCNC: 21 MG/DL (ref 6–23)
CALCIUM SERPL-MCNC: 9.3 MG/DL (ref 8.6–10.3)
CHLORIDE SERPL-SCNC: 107 MMOL/L (ref 98–107)
CO2 SERPL-SCNC: 29 MMOL/L (ref 21–32)
CREAT SERPL-MCNC: 0.9 MG/DL (ref 0.5–1.3)
GFR SERPL CREATININE-BSD FRML MDRD: >90 ML/MIN/1.73M*2
GLUCOSE SERPL-MCNC: 91 MG/DL (ref 74–99)
PHOSPHATE SERPL-MCNC: 3.9 MG/DL (ref 2.5–4.9)
POTASSIUM SERPL-SCNC: 4.7 MMOL/L (ref 3.5–5.3)
SODIUM SERPL-SCNC: 146 MMOL/L (ref 136–145)
T4 FREE SERPL-MCNC: 1.04 NG/DL (ref 0.61–1.12)

## 2023-11-11 PROCEDURE — 36415 COLL VENOUS BLD VENIPUNCTURE: CPT

## 2023-11-11 PROCEDURE — 83935 ASSAY OF URINE OSMOLALITY: CPT

## 2023-11-11 PROCEDURE — 80069 RENAL FUNCTION PANEL: CPT

## 2023-11-11 PROCEDURE — 84402 ASSAY OF FREE TESTOSTERONE: CPT

## 2023-11-11 PROCEDURE — 84439 ASSAY OF FREE THYROXINE: CPT

## 2023-11-12 LAB — OSMOLALITY UR: 707 MOSM/KG (ref 200–1200)

## 2023-11-16 LAB
TESTOSTERONE FREE (CHAN): 110.6 PG/ML (ref 30–135)
TESTOSTERONE,TOTAL,LC-MS/MS: 763 NG/DL (ref 250–1100)

## 2023-11-21 ENCOUNTER — OFFICE VISIT (OUTPATIENT)
Dept: ENDOCRINOLOGY | Facility: HOSPITAL | Age: 73
End: 2023-11-21
Payer: MEDICARE

## 2023-11-21 VITALS
HEIGHT: 67 IN | HEART RATE: 59 BPM | TEMPERATURE: 97.2 F | SYSTOLIC BLOOD PRESSURE: 179 MMHG | DIASTOLIC BLOOD PRESSURE: 108 MMHG | OXYGEN SATURATION: 98 % | WEIGHT: 170.8 LBS | BODY MASS INDEX: 26.81 KG/M2

## 2023-11-21 DIAGNOSIS — E27.40 ADRENAL INSUFFICIENCY (MULTI): ICD-10-CM

## 2023-11-21 DIAGNOSIS — E23.0 HYPOPITUITARISM (MULTI): Primary | ICD-10-CM

## 2023-11-21 DIAGNOSIS — E29.1 HYPOGONADISM MALE: ICD-10-CM

## 2023-11-21 PROCEDURE — 99215 OFFICE O/P EST HI 40 MIN: CPT | Performed by: STUDENT IN AN ORGANIZED HEALTH CARE EDUCATION/TRAINING PROGRAM

## 2023-11-21 PROCEDURE — 1159F MED LIST DOCD IN RCRD: CPT | Performed by: STUDENT IN AN ORGANIZED HEALTH CARE EDUCATION/TRAINING PROGRAM

## 2023-11-21 PROCEDURE — 1125F AMNT PAIN NOTED PAIN PRSNT: CPT | Performed by: STUDENT IN AN ORGANIZED HEALTH CARE EDUCATION/TRAINING PROGRAM

## 2023-11-21 PROCEDURE — 1036F TOBACCO NON-USER: CPT | Performed by: STUDENT IN AN ORGANIZED HEALTH CARE EDUCATION/TRAINING PROGRAM

## 2023-11-21 RX ORDER — MONTELUKAST SODIUM 10 MG/1
10 TABLET ORAL NIGHTLY
COMMUNITY
Start: 2023-09-08

## 2023-11-21 RX ORDER — FLUTICASONE PROPIONATE 50 MCG
2 SPRAY, SUSPENSION (ML) NASAL DAILY
COMMUNITY
Start: 2019-10-04

## 2023-11-21 RX ORDER — TESTOSTERONE CYPIONATE 200 MG/ML
200 INJECTION, SOLUTION INTRAMUSCULAR
Qty: 2 ML | Refills: 2 | Status: SHIPPED | OUTPATIENT
Start: 2023-11-21 | End: 2023-11-29 | Stop reason: SDUPTHER

## 2023-11-21 RX ORDER — HYDROCORTISONE 10 MG/1
10 TABLET ORAL
COMMUNITY
Start: 2023-10-30 | End: 2024-03-21 | Stop reason: SDUPTHER

## 2023-11-21 RX ORDER — LEVOTHYROXINE SODIUM 150 UG/1
150 TABLET ORAL
COMMUNITY
Start: 2010-08-12 | End: 2024-03-01 | Stop reason: SDUPTHER

## 2023-11-21 RX ORDER — TRAMADOL HYDROCHLORIDE 50 MG/1
50 TABLET ORAL EVERY 8 HOURS PRN
COMMUNITY
Start: 2023-11-07 | End: 2024-01-06

## 2023-11-21 RX ORDER — DESMOPRESSIN ACETATE 0.1 MG/1
0.2 TABLET ORAL 2 TIMES DAILY
COMMUNITY
Start: 2010-08-12 | End: 2024-01-16 | Stop reason: SDUPTHER

## 2023-11-21 RX ORDER — BISACODYL 5 MG/1
1 TABLET, COATED ORAL DAILY
COMMUNITY

## 2023-11-21 ASSESSMENT — ENCOUNTER SYMPTOMS
DEPRESSION: 0
OCCASIONAL FEELINGS OF UNSTEADINESS: 0
LOSS OF SENSATION IN FEET: 0

## 2023-11-21 ASSESSMENT — PAIN SCALES - GENERAL: PAINLEVEL: 6

## 2023-11-21 ASSESSMENT — PATIENT HEALTH QUESTIONNAIRE - PHQ9
1. LITTLE INTEREST OR PLEASURE IN DOING THINGS: NOT AT ALL
SUM OF ALL RESPONSES TO PHQ9 QUESTIONS 1 AND 2: 0
2. FEELING DOWN, DEPRESSED OR HOPELESS: NOT AT ALL

## 2023-11-21 NOTE — PROGRESS NOTES
Subjective   Patient ID: Martinez Potter is a 73 y.o. male who presents for Follow-up.  HPI  Mr. 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 Last TEstosterone lab was 763 6 days after injection   Levothyroxine 150 mcg daily. TAkes all meds together  DDAVP 0.2 mg in am and 0.2 mg in pm.  Usually does not wake up at night   Was taking salt but now using substitute salt   Prednisone 5 mg in am and 2.5 mg in the evening before bed previously now on Hc   Had a gall bladder surgery  Had hyponatremia and they stopped DDAVP and started having polyuria. Endocrine were consulted and adjusted his meds.      MRI Brain SWG showed an enhancing mass of the pituitary slightly to the right of midline measuring 1.4 x 1.6 x 1.2 cm. The sella is expanded, pituitary stalk is obscured. There is an 11 mm fluid-filled nonenhancing lesion of the jose star  Labs SWG  February 2023 showed a prolactin of 2.5 testosterone 1499 cortisol random 3.69 vitamin D 25 IGF-I 77  April 2023 showed a sodium of 138 potassium of 3.9 creatinine of 1.1  Free T4 was 1.32, testosterone total was 990  Per wife testosterone was decreased 0.3 every 2 weeks   6/8/2023 MRI sella  Changes of transsphenoidal mass resection are again noted. Multilobulated T2 hyperintense enhancing soft tissue in the postoperative sella and left cavernous sinus measures approximately 2.2 x 1.5 x 1.5 cm (coronal postcontrast image 7 and sagittal postcontrast image 7). Comparison is difficult given differences in technique,  "however this appears slightly increased from 2015. The infundibulum is not visualized. The mass abuts the optic chiasm and right greater than left cisternal segments of the optic nerves.    Last testosterone level 763  Feeling okay except for pain  No change in vision (No double vision or tunneled vision)  Headaches at night when he lays in bed, takes tylenol and advil. More than once a week (per wife 3 times)  Dizziness (Did not tolerate tilt table test)  No nausea or vomiting  Lost 12-14 lbs after gall bladder  Feels the best since no very high T and low  Has some pain in hands, fingers and elbow    Review of Systems  all pertinent systems reviewed and are otherwise negative   Objective   Visit Vitals  BP (!) 179/108 (BP Location: Right arm, Patient Position: Sitting, BP Cuff Size: Adult)   Pulse 59   Temp 36.2 °C (97.2 °F) (Temporal)   Ht 1.702 m (5' 7\")   Wt 77.5 kg (170 lb 12.8 oz)   SpO2 98%   BMI 26.75 kg/m²   Smoking Status Never   BSA 1.91 m²      Physical Exam  Constitutional:       General: He is not in acute distress.     Appearance: Normal appearance.   Eyes:      Extraocular Movements: Extraocular movements intact.      Pupils: Pupils are equal, round, and reactive to light.   Cardiovascular:      Rate and Rhythm: Normal rate and regular rhythm.   Pulmonary:      Effort: Pulmonary effort is normal. No respiratory distress.      Breath sounds: Normal breath sounds.   Abdominal:      General: Bowel sounds are normal.      Palpations: Abdomen is soft.      Tenderness: There is no abdominal tenderness.   Skin:     Coloration: Skin is not jaundiced or pale.      Findings: No erythema or rash.   Neurological:      General: No focal deficit present.      Mental Status: He is alert and oriented to person, place, and time.      Deep Tendon Reflexes: Reflexes normal.   Psychiatric:         Mood and Affect: Mood normal.         Behavior: Behavior normal.     .  Lab Results   Component Value Date    FREET4 1.04 " 11/11/2023        Assessment/Plan   Mr. 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 Last TEstosterone lab was 763 6 days after injection   Levothyroxine 150 mcg daily. TAkes all meds together  DDAVP 0.2 mg in am and 0.2 mg in pm.  Usually does not wake up at night   Was taking salt but now using substitute salt   Prednisone 5 mg in am and 2.5 mg in the evening before bed previously now on Hc   Had a gall bladder surgery  Had hyponatremia and they stopped DDAVP and started having polyuria. Endocrine were consulted and adjusted his meds.      MRI Brain SWG showed an enhancing mass of the pituitary slightly to the right of midline measuring 1.4 x 1.6 x 1.2 cm. The sella is expanded, pituitary stalk is obscured. There is an 11 mm fluid-filled nonenhancing lesion of the jose star  Labs SWG  February 2023 showed a prolactin of 2.5 testosterone 1499 cortisol random 3.69 vitamin D 25 IGF-I 77  April 2023 showed a sodium of 138 potassium of 3.9 creatinine of 1.1  Free T4 was 1.32, testosterone total was 990  Per wife testosterone was decreased 0.3 every 2 weeks   6/8/2023 MRI sella  Changes of transsphenoidal mass resection are again noted. Multilobulated T2 hyperintense enhancing soft tissue in the postoperative sella and left cavernous sinus measures approximately 2.2 x 1.5 x 1.5 cm (coronal postcontrast image 7 and sagittal postcontrast image 7). Comparison is difficult given differences in technique, however this appears slightly increased from 2015. The  infundibulum is not visualized. The mass abuts the optic chiasm and right greater than left cisternal segments of the optic nerves.    Last testosterone level 763  Plan:  Continue levothyroxine 150 mcg daily  Continue Hydrocortisone 10 mg in am, 7.5 mg at 1 pm and 5 mg in the afternoon  Continue DDAVP 0.2 mg in the morning and 0.2 mg at night  Continue testosterone 0.3 mL every 2 weeks 60mg   In case prednisone is needed, if dose above 5-7.5 mg daily hold Hydrocortisone if less let me know  We will refer to Dr. Borjas and if any visual field defects we will refer  to neurosurgery    Blood work before next appointment   MRI sella in July     Follow up in March     Problem List Items Addressed This Visit    None  Visit Diagnoses       Hypopituitarism (CMS/HCC)    -  Primary    Relevant Medications    testosterone cypionate (Depo-Testosterone) 200 mg/mL injection    Other Relevant Orders    Testosterone,Free and Total    Renal Function Panel    Thyroxine, Free    Referral to Ophthalmology    Hypogonadism male        Relevant Medications    testosterone cypionate (Depo-Testosterone) 200 mg/mL injection    Adrenal insufficiency (CMS/HCC)

## 2023-11-21 NOTE — PATIENT INSTRUCTIONS
It was my pleasure to take care of you today in the office. Below are the things we discussed today:     Continue levothyroxine 150 mcg daily  Continue Hydrocortisone 10 mg in am, 7.5 mg at 1 pm and 5 mg in the afternoon  Continue DDAVP 0.2 mg in the morning and 0.2 mg at night  Continue testosterone 0.3 mL every 2 weeks 60mg   In case prednisone is needed, if dose above 5-7.5 mg daily hold Hydrocortisone if less let me know  We will refer you to Dr. Borjas and if any visual field defects we will refer you to neurosurgery    Blood work before next appointment please try to get the blood work done 3 to 4 days after the shots so we can decide on testosterone dosage  MRI sella in July  Please Call Dr. Faustin at [phone] with questions     Follow up in March

## 2023-11-29 DIAGNOSIS — E23.0 HYPOPITUITARISM (MULTI): ICD-10-CM

## 2023-11-29 DIAGNOSIS — E29.1 HYPOGONADISM MALE: ICD-10-CM

## 2023-11-29 RX ORDER — TESTOSTERONE CYPIONATE 200 MG/ML
200 INJECTION, SOLUTION INTRAMUSCULAR
Qty: 2 ML | Refills: 2 | Status: SHIPPED | OUTPATIENT
Start: 2023-11-29 | End: 2024-03-21 | Stop reason: SDUPTHER

## 2023-12-05 NOTE — PROGRESS NOTES
Assessment and Plan    06/08/2023 MRI sella with contrast, which I personally reviewed, shows a hetereogenously enhancing sellar mass contacting the anterior optic chiasm.  Previous head imaging.    12/6/2023 OCT RNFL OD 68 with T & I thinning & OS 67 with T & borderline S thinning. ([facility])    12/6/2023 HVF 24-2 OD fovea 36, wnl MD +0.64 & OS fovea 34, temporal rim artifact versus depression MD -1.99.    This 73 year-old man with a history of pituitary tumor status post resection 1980s complicated by hypopituitarism with hypogonadism, adrenal insufficiency and hypothyroidism, rheumatoid arthritis, HTN, asthma presents for evaluation of visual fields.    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    His symptoms are consistent with posterior vitreous detachment. At this point, he needs no intervention. We reviewed retinal detachment precautions.    Since starting hydroxychloroquine, he should have baseline HVF10-2 and OCT macula with testing in 5 years, sooner if problems arise. These tests often are better pursued with primary eye care.    Plan    Surveillance and management of hypopituitarism.    Follow up in 4-6 months with HVF & OCT. (Dilated 12/6/2023)   Yes - the patient is able to be screened

## 2023-12-06 ENCOUNTER — OFFICE VISIT (OUTPATIENT)
Dept: OPHTHALMOLOGY | Facility: CLINIC | Age: 73
End: 2023-12-06
Payer: MEDICARE

## 2023-12-06 DIAGNOSIS — E23.0 HYPOPITUITARISM (MULTI): ICD-10-CM

## 2023-12-06 DIAGNOSIS — H43.811 PVD (POSTERIOR VITREOUS DETACHMENT), RIGHT EYE: ICD-10-CM

## 2023-12-06 DIAGNOSIS — D35.2 SUPRASELLAR EXTENSION OF PITUITARY ADENOMA (MULTI): Primary | ICD-10-CM

## 2023-12-06 PROCEDURE — 99205 OFFICE O/P NEW HI 60 MIN: CPT | Performed by: PSYCHIATRY & NEUROLOGY

## 2023-12-06 PROCEDURE — 92133 CPTRZD OPH DX IMG PST SGM ON: CPT | Performed by: PSYCHIATRY & NEUROLOGY

## 2023-12-06 PROCEDURE — 92083 EXTENDED VISUAL FIELD XM: CPT | Performed by: PSYCHIATRY & NEUROLOGY

## 2023-12-06 ASSESSMENT — ENCOUNTER SYMPTOMS
NEUROLOGICAL NEGATIVE: 0
CARDIOVASCULAR NEGATIVE: 0
RESPIRATORY NEGATIVE: 0
EYES NEGATIVE: 1
ALLERGIC/IMMUNOLOGIC NEGATIVE: 0
CONSTITUTIONAL NEGATIVE: 0
PSYCHIATRIC NEGATIVE: 0
HEMATOLOGIC/LYMPHATIC NEGATIVE: 0
ENDOCRINE NEGATIVE: 0
GASTROINTESTINAL NEGATIVE: 0
MUSCULOSKELETAL NEGATIVE: 0

## 2023-12-06 ASSESSMENT — SLIT LAMP EXAM - LIDS
COMMENTS: NORMAL
COMMENTS: NORMAL

## 2023-12-06 ASSESSMENT — CUP TO DISC RATIO
OS_RATIO: 0.2
OD_RATIO: 0.2

## 2023-12-06 ASSESSMENT — CONF VISUAL FIELD
OS_INFERIOR_TEMPORAL_RESTRICTION: 0
OS_NORMAL: 1
OD_INFERIOR_NASAL_RESTRICTION: 0
METHOD: COUNTING FINGERS
OD_SUPERIOR_NASAL_RESTRICTION: 0
OD_SUPERIOR_TEMPORAL_RESTRICTION: 0
OD_NORMAL: 1
OS_SUPERIOR_TEMPORAL_RESTRICTION: 0
OD_INFERIOR_TEMPORAL_RESTRICTION: 0
OS_INFERIOR_NASAL_RESTRICTION: 0
OS_SUPERIOR_NASAL_RESTRICTION: 0

## 2023-12-06 ASSESSMENT — EXTERNAL EXAM - LEFT EYE: OS_EXAM: NORMAL

## 2023-12-06 ASSESSMENT — EXTERNAL EXAM - RIGHT EYE: OD_EXAM: NORMAL

## 2023-12-14 ENCOUNTER — TELEMEDICINE (OUTPATIENT)
Dept: PULMONOLOGY | Age: 73
End: 2023-12-14
Payer: MEDICARE

## 2023-12-14 DIAGNOSIS — G47.33 OSA (OBSTRUCTIVE SLEEP APNEA): Primary | ICD-10-CM

## 2023-12-14 PROCEDURE — 99214 OFFICE O/P EST MOD 30 MIN: CPT | Performed by: INTERNAL MEDICINE

## 2023-12-14 PROCEDURE — 3017F COLORECTAL CA SCREEN DOC REV: CPT | Performed by: INTERNAL MEDICINE

## 2023-12-14 PROCEDURE — 1036F TOBACCO NON-USER: CPT | Performed by: INTERNAL MEDICINE

## 2023-12-14 PROCEDURE — G8484 FLU IMMUNIZE NO ADMIN: HCPCS | Performed by: INTERNAL MEDICINE

## 2023-12-14 PROCEDURE — G8417 CALC BMI ABV UP PARAM F/U: HCPCS | Performed by: INTERNAL MEDICINE

## 2023-12-14 PROCEDURE — 1123F ACP DISCUSS/DSCN MKR DOCD: CPT | Performed by: INTERNAL MEDICINE

## 2023-12-14 PROCEDURE — G8428 CUR MEDS NOT DOCUMENT: HCPCS | Performed by: INTERNAL MEDICINE

## 2023-12-14 RX ORDER — METHOTREXATE 2.5 MG/1
2.5 TABLET ORAL
COMMUNITY
Start: 2023-11-27

## 2023-12-14 NOTE — PATIENT INSTRUCTIONS
Please call Dr Chau Falling office to schedule follow up appointment. You will need follow up after sleep study completed. MAILED AVS         YOU ARE BEING REFERRED TO:    [facility] (a [facility])    [address] [facility]    Main Phone Number: [phone]    Phone number for scheduling sleep study: [phone]      Please contact the above numbers to schedule your sleep study. Once you have completed the FIRST NIGHT you may be asked to return for a SECOND NIGHT if necessary. After the SECOND NIGHT the sleep center will order a CPAP/BiPAP machine for you. An order for the machine will be sent to a durable medical equipment company ([employer]). The sleep center will provide you with the [employer] companies information. Once you have your machine please contact our office to schedule a follow up appointment. Your follow up will be scheduled for a date 30-90 days after you have received your machine. At that follow up visit we will need to have a compliance download from your DME company. Please contact your [employer] company to have the compliance download faxed to our office at   [phone] for your follow up appointment. IF YOU HAVE ANY QUESTIONS ABOUT YOUR SLEEP STUDY   PLEASE CONTACT THE SLEEP CENTER.

## 2023-12-14 NOTE — PROGRESS NOTES
2023    Jesusita Olmedo, was evaluated through a synchronous (real-time) audio-video encounter. The patient (or guardian if applicable) is aware that this is a billable service, which includes applicable co-pays. This Virtual Visit was conducted with patient's (and/or legal guardian's) consent. Patient identification was verified, and a caregiver was present when appropriate. The patient was located at Home: [facility] [address] [address] [facility] [address] [facility]  Provider was located at [facility] ([address]): [facility].  [address],  [address] [facility]     Total time spent for this encounter:  30 minutes    --Michael Lanier MD on 2023 at 10:23 AM    An electronic signature was used to authenticate this note. Patient and physician are located in their individual locations. This is visit is completed via [employer] application []/ Telephone []     HPI:    Jesusita Olmedo (:  1950) has requested an audio/video evaluation for the following concern(s):    He is known to have chronic obstructive lung disease due to chronic bronchitis and emphysema. The pulmonary function studies have revealed mild restriction. He is not very much overweight. There is no pedal edema no thromboembolic process. His pulmonary function study in the past has revealed mild degree of restrictive ventilatory dysfunction with decreased diffusion. Patient has been diagnosed to have bronchial asthma which is being treated with albuterol and Symbicort successfully. He does rinse his mouth and throat after use of Symbicort. Denies any wheezing episodes. Denies any acute exacerbation of asthma. He does have a is sleep apnea syndrome very likely but has not done his sleep study. He also has hypothyroid state and is replacement therapy. He already taken his COVID-vaccine and flu vaccine. No pedal edema no thromboembolic process does complain allergic rhinitis. He has taken the flu vaccine also.           Review of

## 2024-01-03 ENCOUNTER — TELEPHONE (OUTPATIENT)
Dept: PULMONOLOGY | Age: 74
End: 2024-01-03

## 2024-01-03 NOTE — TELEPHONE ENCOUNTER
Patient called, states he wants his sleep study done in [place]. We were unaware of this at the time of his last office visit. I asked for a fax number so I could fax the order and he states that they wont accept our order since we dont have priviledges there.     I advised the patient to get an appt with a doctor there in [place], start all over to get worked up for sleep apnea and referred by that doctor if that doctor feels it appropriate. Pt voiced understanding. Order for sleep study by Dr Garcia was cancelled.

## 2024-01-16 DIAGNOSIS — E23.2 DIABETES INSIPIDUS (MULTI): ICD-10-CM

## 2024-01-16 RX ORDER — DESMOPRESSIN ACETATE 0.1 MG/1
0.2 TABLET ORAL 2 TIMES DAILY
Qty: 360 TABLET | Refills: 3 | Status: SHIPPED | OUTPATIENT
Start: 2024-01-16

## 2024-03-01 DIAGNOSIS — E23.0 HYPOPITUITARISM (MULTI): ICD-10-CM

## 2024-03-01 RX ORDER — LEVOTHYROXINE SODIUM 150 UG/1
150 TABLET ORAL
Qty: 90 TABLET | Refills: 3 | Status: SHIPPED | OUTPATIENT
Start: 2024-03-01

## 2024-03-21 ENCOUNTER — OFFICE VISIT (OUTPATIENT)
Dept: ENDOCRINOLOGY | Facility: HOSPITAL | Age: 74
End: 2024-03-21
Payer: MEDICARE

## 2024-03-21 VITALS
DIASTOLIC BLOOD PRESSURE: 102 MMHG | HEART RATE: 58 BPM | BODY MASS INDEX: 25.76 KG/M2 | TEMPERATURE: 97.3 F | HEIGHT: 68 IN | OXYGEN SATURATION: 96 % | WEIGHT: 170 LBS | SYSTOLIC BLOOD PRESSURE: 153 MMHG

## 2024-03-21 DIAGNOSIS — E27.40 ADRENAL INSUFFICIENCY (MULTI): ICD-10-CM

## 2024-03-21 DIAGNOSIS — E23.0 HYPOPITUITARISM (MULTI): Primary | ICD-10-CM

## 2024-03-21 DIAGNOSIS — E29.1 HYPOGONADISM MALE: ICD-10-CM

## 2024-03-21 LAB
ALBUMIN SERPL BCP-MCNC: 4.1 G/DL (ref 3.4–5)
ANION GAP SERPL CALC-SCNC: 11 MMOL/L (ref 10–20)
BUN SERPL-MCNC: 23 MG/DL (ref 6–23)
CALCIUM SERPL-MCNC: 9.3 MG/DL (ref 8.6–10.6)
CHLORIDE SERPL-SCNC: 108 MMOL/L (ref 98–107)
CO2 SERPL-SCNC: 28 MMOL/L (ref 21–32)
CREAT SERPL-MCNC: 0.9 MG/DL (ref 0.5–1.3)
EGFRCR SERPLBLD CKD-EPI 2021: >90 ML/MIN/1.73M*2
GLUCOSE SERPL-MCNC: 84 MG/DL (ref 74–99)
PHOSPHATE SERPL-MCNC: 4.1 MG/DL (ref 2.5–4.9)
POTASSIUM SERPL-SCNC: 4.5 MMOL/L (ref 3.5–5.3)
SODIUM SERPL-SCNC: 142 MMOL/L (ref 136–145)
T4 FREE SERPL-MCNC: 1.23 NG/DL (ref 0.78–1.48)

## 2024-03-21 PROCEDURE — 36415 COLL VENOUS BLD VENIPUNCTURE: CPT | Performed by: STUDENT IN AN ORGANIZED HEALTH CARE EDUCATION/TRAINING PROGRAM

## 2024-03-21 PROCEDURE — 99215 OFFICE O/P EST HI 40 MIN: CPT | Performed by: STUDENT IN AN ORGANIZED HEALTH CARE EDUCATION/TRAINING PROGRAM

## 2024-03-21 PROCEDURE — 1126F AMNT PAIN NOTED NONE PRSNT: CPT | Performed by: STUDENT IN AN ORGANIZED HEALTH CARE EDUCATION/TRAINING PROGRAM

## 2024-03-21 PROCEDURE — 1036F TOBACCO NON-USER: CPT | Performed by: STUDENT IN AN ORGANIZED HEALTH CARE EDUCATION/TRAINING PROGRAM

## 2024-03-21 PROCEDURE — 80069 RENAL FUNCTION PANEL: CPT | Performed by: STUDENT IN AN ORGANIZED HEALTH CARE EDUCATION/TRAINING PROGRAM

## 2024-03-21 PROCEDURE — 84439 ASSAY OF FREE THYROXINE: CPT | Performed by: STUDENT IN AN ORGANIZED HEALTH CARE EDUCATION/TRAINING PROGRAM

## 2024-03-21 RX ORDER — TESTOSTERONE CYPIONATE 200 MG/ML
100 INJECTION, SOLUTION INTRAMUSCULAR
Qty: 2 ML | Refills: 2 | Status: SHIPPED | OUTPATIENT
Start: 2024-03-21 | End: 2024-09-05

## 2024-03-21 RX ORDER — HYDROCORTISONE 10 MG/1
10 TABLET ORAL
Qty: 90 TABLET | Refills: 3 | Status: SHIPPED | OUTPATIENT
Start: 2024-03-21

## 2024-03-21 RX ORDER — HYDROCORTISONE 5 MG/1
TABLET ORAL
Qty: 360 TABLET | Refills: 3 | Status: SHIPPED | OUTPATIENT
Start: 2024-03-21

## 2024-03-21 ASSESSMENT — ENCOUNTER SYMPTOMS
DEPRESSION: 0
OCCASIONAL FEELINGS OF UNSTEADINESS: 1
LOSS OF SENSATION IN FEET: 0

## 2024-03-21 ASSESSMENT — PATIENT HEALTH QUESTIONNAIRE - PHQ9
SUM OF ALL RESPONSES TO PHQ9 QUESTIONS 1 AND 2: 0
1. LITTLE INTEREST OR PLEASURE IN DOING THINGS: NOT AT ALL
2. FEELING DOWN, DEPRESSED OR HOPELESS: NOT AT ALL

## 2024-03-21 ASSESSMENT — PAIN SCALES - GENERAL: PAINLEVEL: 0-NO PAIN

## 2024-03-21 NOTE — TELEPHONE ENCOUNTER
LAST VISIT: 12/14/23  No follow up scheduled (per telephone encounter from 1/3/24 patient was to be finding a new pulmonary/sleep medicine physician in [place])    Per last dictation patient is on this medication. Two month supply pended with note to pharmacy to send to new physician after this. Please sign for refill if ok. Thank you.

## 2024-03-21 NOTE — PATIENT INSTRUCTIONS
Continue levothyroxine 150 mcg daily  Continue Hydrocortisone 10 mg in am, 7.5 mg at 1 pm and 2.5 mg in the afternoon  Continue DDAVP 0.2 mg in the morning and 0.2 mg at night  Continue testosterone 0.3 mL every 2 weeks 60mg     Blood work before next appointment please try to get the blood work done 3 to 4 days after the shots so we can decide on testosterone dosage  MRI sella in July/August     Follow up in August in [place]

## 2024-03-21 NOTE — PROGRESS NOTES
Subjective   Patient ID: Martinez Potter is a 73 y.o. male who presents for No chief complaint on file..  HPI  Mr. 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 Last TEstosterone lab was 763 6 days after injection   Levothyroxine 150 mcg daily. TAkes all meds together  DDAVP 0.2 mg in am and 0.2 mg in pm.  Usually does not wake up at night   Was taking salt but now using substitute salt   Prednisone 5 mg in am and 2.5 mg in the evening before bed previously now on Hc   Had a gall bladder surgery  Had hyponatremia and they stopped DDAVP and started having polyuria. Endocrine were consulted and adjusted his meds.      MRI Brain SWG showed an enhancing mass of the pituitary slightly to the right of midline measuring 1.4 x 1.6 x 1.2 cm. The sella is expanded, pituitary stalk is obscured. There is an 11 mm fluid-filled nonenhancing lesion of the jose star  Labs SWG  February 2023 showed a prolactin of 2.5 testosterone 1499 cortisol random 3.69 vitamin D 25 IGF-I 77  April 2023 showed a sodium of 138 potassium of 3.9 creatinine of 1.1  Free T4 was 1.32, testosterone total was 990  Per wife testosterone was decreased 0.3 every 2 weeks   6/8/2023 MRI sella  Changes of transsphenoidal mass resection are again noted. Multilobulated T2 hyperintense enhancing soft tissue in the postoperative sella and left cavernous sinus measures approximately 2.2 x 1.5 x 1.5 cm (coronal postcontrast image 7 and sagittal postcontrast image 7). Comparison is difficult given  "differences in technique, however this appears slightly increased from 2015. The infundibulum is not visualized. The mass abuts the optic chiasm and right greater than left cisternal segments of the optic nerves.     Last testosterone level 763  Started Hydroxychloroquine. Switched to methotrexate injection   Took prednisone taper in January.  Swelling is improving  Vision staying the same  Currently on levothyroxine 150 mcg daily FT4: 1.04  Hydrocortisone 10 mg in am, 7.5 mg at 1 pm and 2.5 mg in the afternoon  DDAVP 0.2 mg in the morning and 0.2 mg at night  Testosterone 200 mg/ml 0.3 mL every 2 weeks 60mg    Feeling good  Does not feel shakky  No headache  Floaters (has posterior vitreous detachment)   No breast discharge  Energy: Good. Walks around the block twice daily  Sleep: Sometimes sleeps okays average 4:30-6  Sometimes night sweats every now and then  Last testosterone March 10th  Does not feel thirsty a lot.  Positive tilt table test  BP elevated in the morning and then improves during the day  No constipation or diarrhea  Chronic cold intolerance    Review of Systems  all pertinent systems reviewed and are otherwise negative   Objective   Visit Vitals  BP (!) 153/102 (BP Location: Left arm, Patient Position: Sitting, BP Cuff Size: Large adult)   Pulse 58   Temp 36.3 °C (97.3 °F) (Temporal)   Ht 1.727 m (5' 8\")   Wt 77.1 kg (170 lb)   SpO2 96%   BMI 25.85 kg/m²   Smoking Status Never   BSA 1.92 m²      Physical Exam  Constitutional:       General: He is not in acute distress.     Appearance: Normal appearance.   HENT:      Head: Normocephalic.   Eyes:      Extraocular Movements: Extraocular movements intact.      Pupils: Pupils are equal, round, and reactive to light.   Cardiovascular:      Rate and Rhythm: Normal rate and regular rhythm.   Pulmonary:      Effort: Pulmonary effort is normal. No respiratory distress.      Breath sounds: Normal breath sounds.   Abdominal:      General: Bowel sounds are " normal.      Palpations: Abdomen is soft.      Tenderness: There is no abdominal tenderness.   Skin:     Coloration: Skin is not jaundiced or pale.      Findings: No erythema or rash.   Neurological:      General: No focal deficit present.      Mental Status: He is alert and oriented to person, place, and time.      Deep Tendon Reflexes: Reflexes normal.   Psychiatric:         Mood and Affect: Mood normal.         Behavior: Behavior normal.         Assessment/Plan   Mr. 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 Last TEstosterone lab was 763 6 days after injection   Levothyroxine 150 mcg daily. TAkes all meds together  DDAVP 0.2 mg in am and 0.2 mg in pm.  Usually does not wake up at night   Was taking salt but now using substitute salt   Prednisone 5 mg in am and 2.5 mg in the evening before bed previously now on Hc   Had a gall bladder surgery  Had hyponatremia and they stopped DDAVP and started having polyuria. Endocrine were consulted and adjusted his meds.      MRI Brain SWG showed an enhancing mass of the pituitary slightly to the right of midline measuring 1.4 x 1.6 x 1.2 cm. The sella is expanded, pituitary stalk is obscured. There is an 11 mm fluid-filled nonenhancing lesion of the jose star  Labs SWG  February 2023 showed a prolactin of 2.5 testosterone 1499 cortisol random 3.69 vitamin D 25 IGF-I 77  April 2023 showed a sodium of 138 potassium of 3.9 creatinine of 1.1  Free T4 was 1.32, testosterone total was 990   6/8/2023 MRI  sella  Changes of transsphenoidal mass resection are again noted. Multilobulated T2 hyperintense enhancing soft tissue in the postoperative sella and left cavernous sinus measures approximately 2.2 x 1.5 x 1.5 cm (coronal postcontrast image 7 and sagittal postcontrast image 7). Comparison is difficult given differences in technique, however this appears slightly increased from 2015. The infundibulum is not visualized. The mass abuts the optic chiasm and right greater than left cisternal segments of the optic nerves.   Saw Dr. Borjas and he reported no compression on exam  Last testosterone level 763  Started Hydroxychloroquine. Switched to methotrexate injection   Took prednisone taper in January.  Currently on levothyroxine 150 mcg daily FT4: 1.04  Hydrocortisone 10 mg in am, 7.5 mg at 1 pm and 2.5 mg in the afternoon  DDAVP 0.2 mg in the morning and 0.2 mg at night  Testosterone 200 mg/ml 0.3 mL every 2 weeks 60mg    Plan:  Continue levothyroxine 150 mcg daily  Continue Hydrocortisone 10 mg in am, 7.5 mg at 1 pm and 2.5 mg in the afternoon  Continue DDAVP 0.2 mg in the morning and 0.2 mg at night  Continue testosterone 0.3 mL every 2 weeks 60mg     Blood work before next appointment   MRI sella in July/August     Follow up in August in [place]  Problem List Items Addressed This Visit    None  Visit Diagnoses       Hypopituitarism (CMS/HCC)    -  Primary    Relevant Medications    hydrocortisone (Cortef) 5 mg tablet    testosterone cypionate (Depo-Testosterone) 200 mg/mL injection    Other Relevant Orders    Thyroxine, Free (Completed)    Renal Function Panel (Completed)    Testosterone,Free and Total    Renal Function Panel    Thyroxine, Free    MR sella w and wo IV contrast    Hypogonadism male        Relevant Medications    testosterone cypionate (Depo-Testosterone) 200 mg/mL injection    Adrenal insufficiency (CMS/HCC)        Relevant Medications    hydrocortisone (Cortef) 10 mg tablet                 Isabel  MD Roxie 03/21/24 8:34 AM

## 2024-03-22 RX ORDER — ALBUTEROL SULFATE 90 UG/1
2 AEROSOL, METERED RESPIRATORY (INHALATION) 4 TIMES DAILY PRN
Qty: 1 EACH | Refills: 1 | Status: SHIPPED | OUTPATIENT
Start: 2024-03-22

## 2024-05-16 RX ORDER — ALBUTEROL SULFATE 90 UG/1
2 AEROSOL, METERED RESPIRATORY (INHALATION) 4 TIMES DAILY PRN
Qty: 1 EACH | Refills: 7 | Status: SHIPPED | OUTPATIENT
Start: 2024-05-16

## 2024-05-16 NOTE — TELEPHONE ENCOUNTER
LAST VISIT: 12/14/23  NEXT VISIT: 12/10/24    Per last dictation patient is on this medication. Please sign for refill if ok. Thank you.

## 2024-06-17 ENCOUNTER — APPOINTMENT (OUTPATIENT)
Dept: NEUROLOGY | Facility: CLINIC | Age: 74
End: 2024-06-17
Payer: MEDICARE

## 2024-06-17 VITALS
HEIGHT: 68 IN | HEART RATE: 68 BPM | DIASTOLIC BLOOD PRESSURE: 86 MMHG | BODY MASS INDEX: 25.57 KG/M2 | SYSTOLIC BLOOD PRESSURE: 142 MMHG | WEIGHT: 168.7 LBS

## 2024-06-17 DIAGNOSIS — G25.0 ESSENTIAL TREMOR: Primary | ICD-10-CM

## 2024-06-17 PROCEDURE — 1036F TOBACCO NON-USER: CPT | Performed by: PSYCHIATRY & NEUROLOGY

## 2024-06-17 PROCEDURE — 99204 OFFICE O/P NEW MOD 45 MIN: CPT | Performed by: PSYCHIATRY & NEUROLOGY

## 2024-06-17 PROCEDURE — 1160F RVW MEDS BY RX/DR IN RCRD: CPT | Performed by: PSYCHIATRY & NEUROLOGY

## 2024-06-17 PROCEDURE — 1159F MED LIST DOCD IN RCRD: CPT | Performed by: PSYCHIATRY & NEUROLOGY

## 2024-06-17 RX ORDER — HYDROXYCHLOROQUINE SULFATE 200 MG/1
400 TABLET, FILM COATED ORAL DAILY
COMMUNITY
Start: 2024-03-25

## 2024-06-17 RX ORDER — PRIMIDONE 50 MG/1
TABLET ORAL
Qty: 60 TABLET | Refills: 5 | Status: SHIPPED | OUTPATIENT
Start: 2024-06-17

## 2024-06-17 RX ORDER — FOLIC ACID 1 MG/1
1 TABLET ORAL DAILY
COMMUNITY

## 2024-06-17 RX ORDER — TRAMADOL HYDROCHLORIDE 50 MG/1
50 TABLET ORAL EVERY 8 HOURS PRN
COMMUNITY

## 2024-06-17 RX ORDER — METHOTREXATE 25 MG/ML
50 INJECTION, SOLUTION INTRA-ARTERIAL; INTRAMUSCULAR; INTRAVENOUS
COMMUNITY
Start: 2024-01-29

## 2024-06-17 RX ORDER — AMLODIPINE BESYLATE 2.5 MG/1
2.5 TABLET ORAL DAILY
COMMUNITY
Start: 2023-10-09

## 2024-06-17 RX ORDER — ALBUTEROL SULFATE 90 UG/1
2 AEROSOL, METERED RESPIRATORY (INHALATION)
COMMUNITY
Start: 2022-07-11

## 2024-06-17 RX ORDER — BUDESONIDE AND FORMOTEROL FUMARATE DIHYDRATE 160; 4.5 UG/1; UG/1
2 AEROSOL RESPIRATORY (INHALATION) 2 TIMES DAILY
COMMUNITY

## 2024-06-17 ASSESSMENT — FAHN TOLOSA MARTIN TREMOR (FTM)
RUE TOTAL SCORE: 3
VOICE TOTAL SCORE: 1
DRAWING B TOTAL SCORE: 4
DRAWING B RGHT SCORE: 2
FACE AT POSTURE WHEN STANDING OR SITTING: 0
DRAWING A LEFT SCORE: 1
TOUNGE TOTAL SCORE: 1
HEAD AT POSTURE WHEN STANDING OR SITTING: 0
DRAWING A TOTAL SCORE: 2
LE AT REST: 0
TRUNK IN REPOSE: 0
DRAWING A RIGHT SCORE: 1
TRUNK TOTAL SCORE: 0
LE LEG FLEXED AT HIPS AND KNEES AT POSTURE: 0
UE FINGER TO NOSE AND OTHER ACTIONS: 1
PART B SUBTOTAL SCORE: 7
RLE TOTAL SCORE: 0
HEAD TOTAL SCORE: 0
LE LEG FLEXED AT HIPS AND KNEES AT POSTURE: 0
POURING SCORE: 0
HANDWRITING WITH DOMINANT HAND: 1
UE ARM AT REST: 1
UE ARMS OUTSTRECHED WRISTS MILDLY EXTENDED FINGERS SPREAD APART: 1
FACE IN REPOSE: 0
DRAWING C RIGHT SCORE: 0
TRUNK  WHEN SITTING OR STANDING: 0
UE ARM AT REST: 1
DRAWING C TOTAL SCORE: 0
LE AT REST: 0
DRAWING B LEFT SCORE: 2
UE ARMS OUTSTRECHED WRISTS MILDLY EXTENDED FINGERS SPREAD APART: 1
TOUNGE AT REST: 0
FACE TOTAL: 0
VOICE IN ACTION: 1
LE TOE TO FINGER IN A FLEXED POSTURE: 0
LE TOE TO FINGER IN A FLEXED POSTURE: 0
HEAD IN REPOSE: 0
TOUNGE WHEN PROTUDED: 1
DRAWING C LEFT SCORE: 0
LLE TOTAL SCORE: 0

## 2024-06-17 ASSESSMENT — ANXIETY QUESTIONNAIRES
3. WORRYING TOO MUCH ABOUT DIFFERENT THINGS: NOT AT ALL
2. NOT BEING ABLE TO STOP OR CONTROL WORRYING: NOT AT ALL
1. FEELING NERVOUS, ANXIOUS, OR ON EDGE: NOT AT ALL
IF YOU CHECKED OFF ANY PROBLEMS ON THIS QUESTIONNAIRE, HOW DIFFICULT HAVE THESE PROBLEMS MADE IT FOR YOU TO DO YOUR WORK, TAKE CARE OF THINGS AT HOME, OR GET ALONG WITH OTHER PEOPLE: NOT DIFFICULT AT ALL
7. FEELING AFRAID AS IF SOMETHING AWFUL MIGHT HAPPEN: NOT AT ALL
GAD7 TOTAL SCORE: 0
6. BECOMING EASILY ANNOYED OR IRRITABLE: NOT AT ALL
4. TROUBLE RELAXING: NOT AT ALL
5. BEING SO RESTLESS THAT IT IS HARD TO SIT STILL: NOT AT ALL

## 2024-06-17 ASSESSMENT — UNIFIED PARKINSONS DISEASE RATING SCALE (UPDRS)
FINGER_TAPPING_LEFT: 0
POSTURAL_TREMOR_RIGHTHAND: 1
RIGIDITY_RUE: 0
POSTURAL_TREMOR_LEFTHAND: 1
AMPLITUDE_RLE: 0
FREEZING_GAIT: 0
CHAIR_RISING_SCALE: 0
PRONATION_SUPINATION_LEFT: 0
DYSKINESIAS_PRESENT: NO
POSTURE: 0
SPEECH: 0
KINETIC_TREMOR_LEFTHAND: 1
AMPLITUDE_LUE: 1
FINGER_TAPPING_RIGHT: 0
AMPLITUDE_RUE: 1
TOETAPPING_RIGHT: 1
RIGIDITY_LLE: 0
RIGIDITY_LUE: 0
GAIT: 0
FACIAL_EXPRESSION: 0
POSTURAL_STABILITY: 0
KINETIC_TREMOR_RIGHTHAND: 1
TOETAPPING_LEFT: 1
CONSTANCY_TREMOR_ATREST: 1
TOTAL_SCORE: 9
LEVODOPA: NO
PARKINSONS_MEDS: NO
AMPLITUDE_LLE: 0
LEG_AGILITY_LEFT: 0
SPONTANEITY_OF_MOVEMENT: 0
PRONATION_SUPINATION_RIGHT: 0
RIGIDITY_NECK: 0
HANDMOVEMENTS_RIGHT: 0
RIGIDITY_RLE: 0
AMPLITUDE_LIP_JAW: 0
LEG_AGILITY_RIGHT: 0

## 2024-06-17 ASSESSMENT — ENCOUNTER SYMPTOMS
OCCASIONAL FEELINGS OF UNSTEADINESS: 0
LOSS OF SENSATION IN FEET: 0
DEPRESSION: 0

## 2024-06-17 ASSESSMENT — PATIENT HEALTH QUESTIONNAIRE - PHQ9
2. FEELING DOWN, DEPRESSED OR HOPELESS: NOT AT ALL
1. LITTLE INTEREST OR PLEASURE IN DOING THINGS: NOT AT ALL
SUM OF ALL RESPONSES TO PHQ9 QUESTIONS 1 AND 2: 0

## 2024-06-17 NOTE — PATIENT INSTRUCTIONS
Mr. Potter,    You were evaluated for tremors. We believe this to be essential tremor with possibly a component of enhanced physiological tremor. We are not concerned about Parkinson's disease. We will start you on a medication called primidone to  help with this. Other nonpharmacologic measure include weighted utensils. Some medications like your inhalers may also impact the tremor.    For more information, check International essential tremor foundation:  www.essentialtremor.org   -assistive devices for tremor are listed on the essential tremor website: https://essentialtremor.org/resource/assistive-devices/    Please follow up with us in 6-9 months. Call if there are any questions or concerns.    It was a  pleasure seeing you,  Dr. Tristan on behalf of Dr. Brasher with [facility]

## 2024-06-17 NOTE — PROGRESS NOTES
Subjective     Martinez Potter is a right handed  73 y.o. 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 Patient is accompanied by: spouse  Visit type: new patient visit     Has been having shaking in his hands, causing interference in his bilateral hands. Has good days and bad days, stating that on bad days his whole body feels like its shaking.    Onset within the last year. Has been having more frequent and worsening bad days. Mostly with fine motor tasks, especially using screwdriver and eating. Worse with anxiety. Does not note any specific trigger to his tremor. No apparent rest component. Not noticeably changed with alcohol, but only drinks one glass of wine occasionally.    Occasionally feels like body is heavier and he feels tired. Had a fall after prolonged bending down, followed by rapid standing up. Has had tilt table testing for orthostatic hypotension which was positive. No other falls recently. Endorses some unsteadiness associated with standing up too fast or standing still for too long. No changes in smell. Denies any hypophonia or decreased size of handwriting. No diarrhea or constipation. Does move around a lot in sleep (states since childhood). Not clearly acting out dreams. No significant changes in cognitive function. Endorses frustration with tremor, but denies depression or anxiety. Drinks one cup of coffee a day.    ROS otherwise negative except as stated above.    No known family history of tremor. Has two brothers that are not in regular contact. No family history of Parkinson's.    Social Hx: Second hand smoke as child, otherwise no tobacco. Occasional glass of wine, 3x/week. No recreational substance use.    Patient Active Problem List   Diagnosis    Essential tremor      Past Medical History:   Diagnosis Date    Acute bronchospasm 09/28/2018    Cough due to bronchospasm    Candidal stomatitis 05/28/2018    Thrush     Contusion of right front wall of thorax, initial encounter 03/25/2021    Rib contusion, right, initial encounter    Cramp and spasm 06/18/2021    Muscle cramps    Encounter for other general examination     Rectal exam    Other injury of unspecified body region, initial encounter 08/07/2015    Musculoskeletal strain    Other specified disorders of left ear 01/03/2020    Sensation of fullness in left ear    Other specified disorders of temporomandibular joint 01/29/2020    TMJ inflammation    Other specified symptoms and signs involving the circulatory and respiratory systems 09/28/2018    Chest sounds abnormal on percussion or auscultation    Otitis media, unspecified, left ear 10/04/2019    Acute left otitis media    Personal history of other diseases of male genital organs     History of erectile dysfunction    Personal history of other diseases of the nervous system and sense organs 02/10/2017    History of tinnitus    Personal history of other diseases of the respiratory system 09/28/2018    History of acute sinusitis    Personal history of other diseases of the respiratory system 10/04/2019    History of acute sinusitis    Personal history of other diseases of the respiratory system 12/19/2019    History of paranasal sinus congestion    Personal history of other specified conditions     History of pituitary neoplasm    Personal history of other specified conditions 02/16/2021    History of epistaxis    Personal history of other specified conditions 11/28/2019    History of facial pain    Personal history of other specified conditions 11/28/2019    History of postnasal drip    Personal history of other specified conditions 12/19/2019    History of persistent cough    Personal history of other specified conditions 09/28/2018    History of persistent cough    Personal history of other specified conditions 09/28/2018    History of hoarseness    Personal history of other specified conditions 09/28/2018    History  "of vertigo    Spinal stenosis, lumbosacral region 10/07/2021    Lumbosacral spinal stenosis    Tinnitus, left ear 01/29/2020    Tinnitus of left ear    Unspecified injury of thorax, initial encounter 03/25/2021    Rib injury      Past Surgical History:   Procedure Laterality Date    OTHER SURGICAL HISTORY  03/13/2018    Excision Of Pituitary Gland Partial    OTHER SURGICAL HISTORY  11/27/2019    Sinus surgery    TONSILLECTOMY  03/13/2018    Tonsillectomy    TOTAL KNEE ARTHROPLASTY  03/13/2018    Knee Replacement      Social History     Socioeconomic History    Marital status:      Spouse name: Not on file    Number of children: Not on file    Years of education: Not on file    Highest education level: Not on file   Occupational History    Not on file   Tobacco Use    Smoking status: Never    Smokeless tobacco: Never   Substance and Sexual Activity    Alcohol use: Yes     Comment: RARELY    Drug use: Never    Sexual activity: Not on file   Other Topics Concern    Not on file   Social History Narrative    Not on file     Social Determinants of Health     Financial Resource Strain: Not on file   Food Insecurity: Not on file   Transportation Needs: Not on file   Physical Activity: Not on file   Stress: Not on file   Social Connections: Not on file   Intimate Partner Violence: Not on file   Housing Stability: Not on file      No family history on file.   Patient Health Questionnaire-2 Score: 0          Review of Systems  All other system have been reviewed and are negative for complaint.    Vitals:    06/17/24 0755   BP: 142/86   Pulse: 68   Weight: 76.5 kg (168 lb 11.2 oz)   Height: 1.727 m (5' 8\")     Objective   Neurological Exam  GENERAL: Resting comfortably, no acute distress    MENTAL STATE: Orientation was normal to person, place, situaiton, date. Recent and remote memory was intact. Attention span and concentration were normal. Language testing was normal for comprehension, naming, repetition and " expression. General fund of knowledge was intact.    CRANIAL NERVES:   CN 2 Visual fields full to confrontation.   CN 3, 4, 6 Pupils round, 3 mm in diameter, equally reactive to light. Lids symmetric; no ptosis. EOMs normal alignment, full range with normal saccades, pursuit and convergence. No nystagmus.   CN 5 Facial sensation intact bilaterally to light touch  CN 7 Normal and symmetric facial strength. Nasolabial folds symmetric.   CN 8 Hearing intact to conversation.  CN 9 Palate elevates symmetrically.   CN 11 Normal strength of shoulder shrug.  CN 12 Tongue midline, with normal bulk and strength; no fasciculations.     MOTOR: Muscle bulk was normal and tone was normal in both upper and lower extremities. Minimal tremor at rest, purposeful hand motions when counting months backwards. Mild R>L postural tremor. Minimal L>R action tremor. Minimal bradykinesia with toe taps bilaterally, otherwise no significant bradykinesia.                     R L  Delt           5 5  Bicep         5 5  Tricep        5 5   Wrist Flex  5 5   Wrist Ext  5 5             5 5    Hip Flex      5 5  Hip Add      5 5  Leg Ext      5 5  Leg Flex     5 5  DF             5 5  PF             5 5     REFLEXES:     R L  BR:  2+ 2+     Biceps:  2+ 2+   Triceps:  2+  2+      Knee:  2+  2+  Ankle:  2+ 2+    SENSORY: Sensory exam was normal. In both upper and lower extremities, sensation was intact to light touch    COORDINATION: Coordination exam was normal. In both upper extremities, finger-nose-finger was intact without dysmetria or overshoot. In both lower extremities, heel-to-shin was intact. LAZARA intact bilaterally.    GAIT: Station was stable with a normal base. Gait was stable with normal speed. Slightly decreased arm swing. No ataxia, shuffling, steppage or waddling was present. No circumduction was present. Tandem gait was intact. No Romberg sign was present.      MDS UPDRS 1st Score: Motor Examination  Is the patient on medication for  treating the symptoms of Parkinson's Disease?: No  Is the patient on Levodopa?: No  Speech: 0  Facial Expression: 0  Rigidty Neck: 0  Rigidty RUE: 0  Rigidity - LUE: 0  Rigidity RLE: 0  Rigidity LLE: 0  Finger Tapping Right Hand: 0  Finger Tapping Left Hand: 0  Hand Movements- Right Hand: 0  Hand Movements- Left Hand: 0  Pronatiaon-Supination Movments - Right Hand: 0  Pronatiaon-Supination Movments Left Hand: 0  Toe Tapping Right Foot: 1  Toe Tapping - Left Foot: 1  Leg Agility - Right Le  Leg Agility - Left le  Arising from Chair: 0  Gait: 0  Freezing of Gait: 0  Postural Stability: 0  Posture: 0  Global Spontanteity of Movment ( Body Bradykinesia): 0  Postural Tremor - Right Hand: 1  Postural Tremor - Left hand: 1  Kinetic Tremor - Right hand: 1  Kinetic Tremor - Left hand: 1  Rest Tremor Amplitude - RUE: 1  Rest Tremor Amplitude - LUE: 1  Rest Tremor Amplitude - RLE: 0  Rest Tremor Amplitude - LLE: 0  Rest Tremor Amplitude - Lip/Jaw: 0  Constancy of Rest Tremor: 1  MDS UPDRS Total Score: 9  Were dyskinesias (chorea or dystonia) present during examination?: No    [place] Tremor Rating scale:   PART A Face at rest: 0, Face at posture: 0, Face Total: 0, Tongue at rest: 0, Tongue at posture: 1, Tongue total: 1, Voice in action: 1, Voice total: 1, Head at rest: 0, Head at posture: 0, Head total: 0, RUE at rest: 1, RUE at posture: 1, LUE at rest: 1, LUE at posture: 1, LUE in action: 1, RUE total: 3, Trunk at rest: 0, Trunk at posture: 0, Trunk total: 0, RLE at rest: 0, RLE at posture: 0, RLE in action: 0, RLE total: 0, LLE at rest: 0, LLE at posture: 0, LLE in action: 0, LLE total: 0 PART B Handwriting dominant: 1, Drawing A - Right: 1, Drawing A - Left: 1, Drawing A total: 2, Drawing B - Right: 2, Drawing B - Left: 2, Drawing B total: 4, Drawing C - Right: 0, Drawing C - Left: 0, Pourin, Drawing C total: 0, Part B subtotal: 7            Assessment/Plan   Diagnoses and all orders for this  visit:  Essential tremor  -     primidone (Mysoline) 50 mg tablet; 25 mg at bedtime for a week, then 50 mg at bedtime for a week, then 75mg at bedtime for one week, then finally 100mg at bedtime.  -     Follow Up In Neurology; Future      Martinez Potter is a 73 y.o. year old male here for evaluation of tremor. Based on the character of his tremor, we believe this to be mild essential tremor vs enhanced physiological tremor vs combination. There was mild bradykinesia noted on toe taps, but otherwise no evidence of any Parkinsonism by history or exam. As the tremor is interfering with daily functioning, will start him on primidone for medical management. Advised on non-pharmacologic management and given resources for further information.      Treatment's for essential tremor include several pharmacological agents. The American Academy of Neurology guidelines for essential tremor, gives Level A evidence to the use of Propranolol and primidone. There is level B evidence for gabapentin, topamax and benzodiazepines. (Maurice et al Neurology 2005, with update in 2011).  Mr Tristan has asthma, so cannot use propranolol.    - Start primidone 25mg at bedtime to be uptitrated by 25mg weekly to final dose 100mg at bedtime, advised of side effects including sedation and feeling off balance.   - Provided supplemental resources for further information  - RTC in 6-9 months or sooner as needed    Kel Tristan MD  Neurology, PGY-2    Patient was seen, examined, and discussed with attending physician Dr. Brasher    I saw and evaluated the patient. I personally obtained the key and critical portions of the history and physical exam or was physically present for key and critical portions performed by the resident/fellow. I reviewed the resident/fellow's documentation and discussed the patient with the resident/fellow. I agree with the resident/fellow's medical decision making as documented in the note.    Karyna Brasher MD       For  the Evaluation and Management of this patient, the level of Medical Decision Making for this visit was determined based on the following:    The level of COMPLEXITY AND NUMBER OF PROBLEMS ADDRESSED was [MODERATE,] as determined by:   .  MODERATE:  undiagnosed new problem with uncertain prognosis.      The AMOUNT/COMPLEXITY OF DATA TO REVIEW (reviewed, ordered or call for) was [, LIMITED] as determined by:    LIMITED:  my review of prior external notes from a unique source.      The level of RISK OF COMPLICATIONS was  MODERATE, as determined by:    MODERATE:  prescription drug management.      Thus, the level of medical decision making (based on the lower of the two highest elements) was determined to be, MODERATE,  Therefore the appropriate E/M code for this encounter is [[id number]

## 2024-06-17 NOTE — LETTER
June 17, 2024     Tania Hawkins, DO  [address]  [facility] [address]    Patient: Martinez Potter   YOB: 1950   Date of Visit: 6/17/2024       Dear Dr. Tania Hawkins:    Thank you for referring Martinez Potter to me for evaluation. Below are my notes for this consultation.  If you have questions, please do not hesitate to call me. I look forward to following your patient along with you.       Sincerely,     Karyna Brasher MD      CC: No Recipients  ______________________________________________________________________________________    Subjective    Martinez Potter is a right handed  73 y.o. 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 Patient is accompanied by: spouse  Visit type: new patient visit     Has been having shaking in his hands, causing interference in his bilateral hands. Has good days and bad days, stating that on bad days his whole body feels like its shaking.    Onset within the last year. Has been having more frequent and worsening bad days. Mostly with fine motor tasks, especially using screwdriver and eating. Worse with anxiety. Does not note any specific trigger to his tremor. No apparent rest component. Not noticeably changed with alcohol, but only drinks one glass of wine occasionally.    Occasionally feels like body is heavier and he feels tired. Had a fall after prolonged bending down, followed by rapid standing up. Has had tilt table testing for orthostatic hypotension which was positive. No other falls recently. Endorses some unsteadiness associated with standing up too fast or standing still for too long. No changes in smell. Denies any hypophonia or decreased size of handwriting. No diarrhea or constipation. Does move around a lot in sleep (states since childhood). Not clearly acting out dreams. No significant changes in cognitive function. Endorses frustration  with tremor, but denies depression or anxiety. Drinks one cup of coffee a day.    ROS otherwise negative except as stated above.    No known family history of tremor. Has two brothers that are not in regular contact. No family history of Parkinson's.    Social Hx: Second hand smoke as child, otherwise no tobacco. Occasional glass of wine, 3x/week. No recreational substance use.    Patient Active Problem List   Diagnosis   • Essential tremor      Past Medical History:   Diagnosis Date   • Acute bronchospasm 09/28/2018    Cough due to bronchospasm   • Candidal stomatitis 05/28/2018    Thrush   • Contusion of right front wall of thorax, initial encounter 03/25/2021    Rib contusion, right, initial encounter   • Cramp and spasm 06/18/2021    Muscle cramps   • Encounter for other general examination     Rectal exam   • Other injury of unspecified body region, initial encounter 08/07/2015    Musculoskeletal strain   • Other specified disorders of left ear 01/03/2020    Sensation of fullness in left ear   • Other specified disorders of temporomandibular joint 01/29/2020    TMJ inflammation   • Other specified symptoms and signs involving the circulatory and respiratory systems 09/28/2018    Chest sounds abnormal on percussion or auscultation   • Otitis media, unspecified, left ear 10/04/2019    Acute left otitis media   • Personal history of other diseases of male genital organs     History of erectile dysfunction   • Personal history of other diseases of the nervous system and sense organs 02/10/2017    History of tinnitus   • Personal history of other diseases of the respiratory system 09/28/2018    History of acute sinusitis   • Personal history of other diseases of the respiratory system 10/04/2019    History of acute sinusitis   • Personal history of other diseases of the respiratory system 12/19/2019    History of paranasal sinus congestion   • Personal history of other specified conditions     History of pituitary  neoplasm   • Personal history of other specified conditions 02/16/2021    History of epistaxis   • Personal history of other specified conditions 11/28/2019    History of facial pain   • Personal history of other specified conditions 11/28/2019    History of postnasal drip   • Personal history of other specified conditions 12/19/2019    History of persistent cough   • Personal history of other specified conditions 09/28/2018    History of persistent cough   • Personal history of other specified conditions 09/28/2018    History of hoarseness   • Personal history of other specified conditions 09/28/2018    History of vertigo   • Spinal stenosis, lumbosacral region 10/07/2021    Lumbosacral spinal stenosis   • Tinnitus, left ear 01/29/2020    Tinnitus of left ear   • Unspecified injury of thorax, initial encounter 03/25/2021    Rib injury      Past Surgical History:   Procedure Laterality Date   • OTHER SURGICAL HISTORY  03/13/2018    Excision Of Pituitary Gland Partial   • OTHER SURGICAL HISTORY  11/27/2019    Sinus surgery   • TONSILLECTOMY  03/13/2018    Tonsillectomy   • TOTAL KNEE ARTHROPLASTY  03/13/2018    Knee Replacement      Social History     Socioeconomic History   • Marital status:      Spouse name: Not on file   • Number of children: Not on file   • Years of education: Not on file   • Highest education level: Not on file   Occupational History   • Not on file   Tobacco Use   • Smoking status: Never   • Smokeless tobacco: Never   Substance and Sexual Activity   • Alcohol use: Yes     Comment: RARELY   • Drug use: Never   • Sexual activity: Not on file   Other Topics Concern   • Not on file   Social History Narrative   • Not on file     Social Determinants of Health     Financial Resource Strain: Not on file   Food Insecurity: Not on file   Transportation Needs: Not on file   Physical Activity: Not on file   Stress: Not on file   Social Connections: Not on file   Intimate Partner Violence: Not on  "file   Housing Stability: Not on file      No family history on file.   Patient Health Questionnaire-2 Score: 0          Review of Systems  All other system have been reviewed and are negative for complaint.    Vitals:    06/17/24 0755   BP: 142/86   Pulse: 68   Weight: 76.5 kg (168 lb 11.2 oz)   Height: 1.727 m (5' 8\")     Objective  Neurological Exam  GENERAL: Resting comfortably, no acute distress    MENTAL STATE: Orientation was normal to person, place, situaiton, date. Recent and remote memory was intact. Attention span and concentration were normal. Language testing was normal for comprehension, naming, repetition and expression. General fund of knowledge was intact.    CRANIAL NERVES:   CN 2 Visual fields full to confrontation.   CN 3, 4, 6 Pupils round, 3 mm in diameter, equally reactive to light. Lids symmetric; no ptosis. EOMs normal alignment, full range with normal saccades, pursuit and convergence. No nystagmus.   CN 5 Facial sensation intact bilaterally to light touch  CN 7 Normal and symmetric facial strength. Nasolabial folds symmetric.   CN 8 Hearing intact to conversation.  CN 9 Palate elevates symmetrically.   CN 11 Normal strength of shoulder shrug.  CN 12 Tongue midline, with normal bulk and strength; no fasciculations.     MOTOR: Muscle bulk was normal and tone was normal in both upper and lower extremities. Minimal tremor at rest, purposeful hand motions when counting months backwards. Mild R>L postural tremor. Minimal L>R action tremor. Minimal bradykinesia with toe taps bilaterally, otherwise no significant bradykinesia.                     R L  Delt           5 5  Bicep         5 5  Tricep        5 5   Wrist Flex  5 5   Wrist Ext  5 5             5 5    Hip Flex      5 5  Hip Add      5 5  Leg Ext      5 5  Leg Flex     5 5  DF             5 5  PF             5 5     REFLEXES:     R L  BR:  2+ 2+     Biceps:  2+ 2+   Triceps:  2+  2+      Knee:  2+  2+  Ankle:  2+ 2+    SENSORY: Sensory " exam was normal. In both upper and lower extremities, sensation was intact to light touch    COORDINATION: Coordination exam was normal. In both upper extremities, finger-nose-finger was intact without dysmetria or overshoot. In both lower extremities, heel-to-shin was intact. LAZARA intact bilaterally.    GAIT: Station was stable with a normal base. Gait was stable with normal speed. Slightly decreased arm swing. No ataxia, shuffling, steppage or waddling was present. No circumduction was present. Tandem gait was intact. No Romberg sign was present.      MDS UPDRS 1st Score: Motor Examination  Is the patient on medication for treating the symptoms of Parkinson's Disease?: No  Is the patient on Levodopa?: No  Speech: 0  Facial Expression: 0  Rigidty Neck: 0  Rigidty RUE: 0  Rigidity - LUE: 0  Rigidity RLE: 0  Rigidity LLE: 0  Finger Tapping Right Hand: 0  Finger Tapping Left Hand: 0  Hand Movements- Right Hand: 0  Hand Movements- Left Hand: 0  Pronatiaon-Supination Movments - Right Hand: 0  Pronatiaon-Supination Movments Left Hand: 0  Toe Tapping Right Foot: 1  Toe Tapping - Left Foot: 1  Leg Agility - Right Le  Leg Agility - Left le  Arising from Chair: 0  Gait: 0  Freezing of Gait: 0  Postural Stability: 0  Posture: 0  Global Spontanteity of Movment ( Body Bradykinesia): 0  Postural Tremor - Right Hand: 1  Postural Tremor - Left hand: 1  Kinetic Tremor - Right hand: 1  Kinetic Tremor - Left hand: 1  Rest Tremor Amplitude - RUE: 1  Rest Tremor Amplitude - LUE: 1  Rest Tremor Amplitude - RLE: 0  Rest Tremor Amplitude - LLE: 0  Rest Tremor Amplitude - Lip/Jaw: 0  Constancy of Rest Tremor: 1  MDS UPDRS Total Score: 9  Were dyskinesias (chorea or dystonia) present during examination?: No    [place] Tremor Rating scale:   PART A Face at rest: 0, Face at posture: 0, Face Total: 0, Tongue at rest: 0, Tongue at posture: 1, Tongue total: 1, Voice in action: 1, Voice total: 1, Head at rest: 0, Head at posture:  0, Head total: 0, RUE at rest: 1, RUE at posture: 1, LUE at rest: 1, LUE at posture: 1, LUE in action: 1, RUE total: 3, Trunk at rest: 0, Trunk at posture: 0, Trunk total: 0, RLE at rest: 0, RLE at posture: 0, RLE in action: 0, RLE total: 0, LLE at rest: 0, LLE at posture: 0, LLE in action: 0, LLE total: 0 PART B Handwriting dominant: 1, Drawing A - Right: 1, Drawing A - Left: 1, Drawing A total: 2, Drawing B - Right: 2, Drawing B - Left: 2, Drawing B total: 4, Drawing C - Right: 0, Drawing C - Left: 0, Pourin, Drawing C total: 0, Part B subtotal: 7            Assessment/Plan  Diagnoses and all orders for this visit:  Essential tremor  -     primidone (Mysoline) 50 mg tablet; 25 mg at bedtime for a week, then 50 mg at bedtime for a week, then 75mg at bedtime for one week, then finally 100mg at bedtime.  -     Follow Up In Neurology; Future      Martinez Potter is a 73 y.o. year old male here for evaluation of tremor. Based on the character of his tremor, we believe this to be mild essential tremor vs enhanced physiological tremor vs combination. There was mild bradykinesia noted on toe taps, but otherwise no evidence of any Parkinsonism by history or exam. As the tremor is interfering with daily functioning, will start him on primidone for medical management. Advised on non-pharmacologic management and given resources for further information.      Treatment's for essential tremor include several pharmacological agents. The American Academy of Neurology guidelines for essential tremor, gives Level A evidence to the use of Propranolol and primidone. There is level B evidence for gabapentin, topamax and benzodiazepines. (Maurice et al Neurology 2005, with update in ).  Mr rTistan has asthma, so cannot use propranolol.    - Start primidone 25mg at bedtime to be uptitrated by 25mg weekly to final dose 100mg at bedtime, advised of side effects including sedation and feeling off balance.   - Provided supplemental  resources for further information  - RTC in 6-9 months or sooner as needed    Kel Tristan MD  Neurology, PGY-2    Patient was seen, examined, and discussed with attending physician Dr. Brasher    I saw and evaluated the patient. I personally obtained the key and critical portions of the history and physical exam or was physically present for key and critical portions performed by the resident/fellow. I reviewed the resident/fellow's documentation and discussed the patient with the resident/fellow. I agree with the resident/fellow's medical decision making as documented in the note.    Karyna Brasher MD       For the Evaluation and Management of this patient, the level of Medical Decision Making for this visit was determined based on the following:    The level of COMPLEXITY AND NUMBER OF PROBLEMS ADDRESSED was [MODERATE,] as determined by:   .  MODERATE:  undiagnosed new problem with uncertain prognosis.      The AMOUNT/COMPLEXITY OF DATA TO REVIEW (reviewed, ordered or call for) was [, LIMITED] as determined by:    LIMITED:  my review of prior external notes from a unique source.      The level of RISK OF COMPLICATIONS was  MODERATE, as determined by:    MODERATE:  prescription drug management.      Thus, the level of medical decision making (based on the lower of the two highest elements) was determined to be, MODERATE,  Therefore the appropriate E/M code for this encounter is [86030

## 2024-06-17 NOTE — PROGRESS NOTES
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  Previous head imaging.    06/18/2024 OCT RNFL OD 76 & OS 68. (Higher OD & stable OS)  12/06/2023 OCT RNFL OD 68 with T & I thinning & OS 67 with T & borderline S thinning. (Cirrus)    06/18/2024 HVF 24-2 OD fovea 37, temporal depression, more evident on pattern MD +0.84 & OS fovea 35, temporal depression, more evident on pattern MD +0.41.  12/06/2023 HVF 24-2 OD fovea 36, wnl MD +0.64 & OS fovea 34, temporal rim artifact versus depression MD -1.99.    This 73 year-old man with a history of pituitary tumor status post resection 1980s complicated by hypopituitarism with hypogonadism, adrenal insufficiency and hypothyroidism, essential tremor, rheumatoid arthritis, HTN, asthma presents in follow up for evaluation of visual fields.    He has subtle evidence of bitemporal vision loss. MRI already is ordered, and I recommend having it and following Rico visual field (HVF) testing.    Regarding hydroxychloroquine, I recommend monitoring with primary eye care or retina.    Plan    Repeat MRI sella with contrast, already ordered.  Surveillance and management of hypopituitarism.    Follow up in 3-4 months with HVF & OCT. (Dilated 12/6/2023)

## 2024-06-17 NOTE — PROGRESS NOTES
Subjective     Martinez Potter is a {handedness:495005}  73 y.o. year old male who presents with Tremors. {Patient is accompanied by (Optional):58501}  Visit type: {Visit Type:67694}       Review of Motor symptoms:  Tremor:  Location- ***                 Rest/postural/action- ***  Stiffness/rigidity: ***  Slowness:  ***  Trouble walking:  ***  Freezing of gait:  ***  Balance problems:  ***  Falls:  ***  Changes in speech:  ***  Swallowing difficulties:  ***  Abnormal postures:  ***  Handwriting: ***  Activities of daily living (buttoning clothes, bathing, cutting food, etc):  ***    Review of Non-Motor symptoms:  Cognition:  Memory- ***         Hallucinations-  ***           Mood:        Depression-  ***                       Anxiety: ***                       Obsessive/compulsive behaviors- ***  Sleep disturbances including REM behavior disorder:***  Sensory changes (ie, smell or taste):  ***  Cramps/pain:  ***  Gastrointestinal complaints/Constipation:  ***  Urinary retention or frequency:  ***  Positional lightheadedness and/or syncope:  ***  Excessive saliva/drooling:  ***  Fatigue:  ***    Medication History:      There is no problem list on file for this patient.     Past Medical History:   Diagnosis Date    Acute bronchospasm 09/28/2018    Cough due to bronchospasm    Candidal stomatitis 05/28/2018    Thrush    Contusion of right front wall of thorax, initial encounter 03/25/2021    Rib contusion, right, initial encounter    Cramp and spasm 06/18/2021    Muscle cramps    Encounter for other general examination     Rectal exam    Other injury of unspecified body region, initial encounter 08/07/2015    Musculoskeletal strain    Other specified disorders of left ear 01/03/2020    Sensation of fullness in left ear    Other specified disorders of temporomandibular joint 01/29/2020    TMJ inflammation    Other specified symptoms and signs involving the circulatory and respiratory systems 09/28/2018    Chest sounds  abnormal on percussion or auscultation    Otitis media, unspecified, left ear 10/04/2019    Acute left otitis media    Personal history of other diseases of male genital organs     History of erectile dysfunction    Personal history of other diseases of the nervous system and sense organs 02/10/2017    History of tinnitus    Personal history of other diseases of the respiratory system 09/28/2018    History of acute sinusitis    Personal history of other diseases of the respiratory system 10/04/2019    History of acute sinusitis    Personal history of other diseases of the respiratory system 12/19/2019    History of paranasal sinus congestion    Personal history of other specified conditions     History of pituitary neoplasm    Personal history of other specified conditions 02/16/2021    History of epistaxis    Personal history of other specified conditions 11/28/2019    History of facial pain    Personal history of other specified conditions 11/28/2019    History of postnasal drip    Personal history of other specified conditions 12/19/2019    History of persistent cough    Personal history of other specified conditions 09/28/2018    History of persistent cough    Personal history of other specified conditions 09/28/2018    History of hoarseness    Personal history of other specified conditions 09/28/2018    History of vertigo    Spinal stenosis, lumbosacral region 10/07/2021    Lumbosacral spinal stenosis    Tinnitus, left ear 01/29/2020    Tinnitus of left ear    Unspecified injury of thorax, initial encounter 03/25/2021    Rib injury      Past Surgical History:   Procedure Laterality Date    OTHER SURGICAL HISTORY  03/13/2018    Excision Of Pituitary Gland Partial    OTHER SURGICAL HISTORY  11/27/2019    Sinus surgery    TONSILLECTOMY  03/13/2018    Tonsillectomy    TOTAL KNEE ARTHROPLASTY  03/13/2018    Knee Replacement      Social History     Socioeconomic History    Marital status:      Spouse name: Not  on file    Number of children: Not on file    Years of education: Not on file    Highest education level: Not on file   Occupational History    Not on file   Tobacco Use    Smoking status: Never    Smokeless tobacco: Never   Substance and Sexual Activity    Alcohol use: Yes     Comment: RARELY    Drug use: Never    Sexual activity: Not on file   Other Topics Concern    Not on file   Social History Narrative    Not on file     Social Determinants of Health     Financial Resource Strain: Not on file   Food Insecurity: Not on file   Transportation Needs: Not on file   Physical Activity: Not on file   Stress: Not on file   Social Connections: Not on file   Intimate Partner Violence: Not on file   Housing Stability: Not on file      No family history on file.   Patient Health Questionnaire-2 Score: 0          Review of Systems  All other system have been reviewed and are negative for complaint.  Objective   Neurological Exam      {MDS UPDRS 1st Score (Optional):43510}                     {Imaging Results (Optional):19108}      Assessment/Plan   {Assess/Plan SmartLinks (Optional):17637}    Martinez Potter is a 73 y.o. year old male here for

## 2024-06-18 ENCOUNTER — APPOINTMENT (OUTPATIENT)
Dept: OPHTHALMOLOGY | Facility: CLINIC | Age: 74
End: 2024-06-18
Payer: MEDICARE

## 2024-06-18 DIAGNOSIS — D35.2 SUPRASELLAR EXTENSION OF PITUITARY ADENOMA (MULTI): Primary | ICD-10-CM

## 2024-06-18 DIAGNOSIS — H53.47 HETERONYMOUS BILATERAL FIELD DEFECTS IN VISUAL FIELD: ICD-10-CM

## 2024-06-18 PROCEDURE — 92083 EXTENDED VISUAL FIELD XM: CPT | Performed by: PSYCHIATRY & NEUROLOGY

## 2024-06-18 PROCEDURE — 99214 OFFICE O/P EST MOD 30 MIN: CPT | Performed by: PSYCHIATRY & NEUROLOGY

## 2024-06-18 PROCEDURE — 92133 CPTRZD OPH DX IMG PST SGM ON: CPT | Performed by: PSYCHIATRY & NEUROLOGY

## 2024-06-18 ASSESSMENT — EXTERNAL EXAM - RIGHT EYE: OD_EXAM: NORMAL

## 2024-06-18 ASSESSMENT — CONF VISUAL FIELD
COMMENTS: SEE HVF
OD_SUPERIOR_NASAL_RESTRICTION: 0
OS_SUPERIOR_TEMPORAL_RESTRICTION: 0
OD_INFERIOR_NASAL_RESTRICTION: 0
OD_NORMAL: 1
OD_INFERIOR_TEMPORAL_RESTRICTION: 0
OD_SUPERIOR_TEMPORAL_RESTRICTION: 0
OS_INFERIOR_TEMPORAL_RESTRICTION: 0
OS_SUPERIOR_NASAL_RESTRICTION: 0
OS_INFERIOR_NASAL_RESTRICTION: 0
OS_NORMAL: 1
METHOD: COUNTING FINGERS

## 2024-06-18 ASSESSMENT — VISUAL ACUITY
METHOD: SNELLEN - LINEAR
CORRECTION_TYPE: GLASSES
OS_CC: 20/20
OD_CC: 20/20

## 2024-06-18 ASSESSMENT — TONOMETRY
IOP_METHOD: GOLDMANN APPLANATION
OS_IOP_MMHG: 10
OD_IOP_MMHG: 10

## 2024-06-18 ASSESSMENT — ENCOUNTER SYMPTOMS
GASTROINTESTINAL NEGATIVE: 0
RESPIRATORY NEGATIVE: 0
NEUROLOGICAL NEGATIVE: 0
EYES NEGATIVE: 1
ALLERGIC/IMMUNOLOGIC NEGATIVE: 0
CARDIOVASCULAR NEGATIVE: 0
HEMATOLOGIC/LYMPHATIC NEGATIVE: 0
ENDOCRINE NEGATIVE: 0
PSYCHIATRIC NEGATIVE: 0
MUSCULOSKELETAL NEGATIVE: 0
CONSTITUTIONAL NEGATIVE: 0

## 2024-06-18 ASSESSMENT — CUP TO DISC RATIO
OD_RATIO: 0.2
OS_RATIO: 0.2

## 2024-06-18 ASSESSMENT — REFRACTION_WEARINGRX
OS_CYLINDER: -1.50
OS_AXIS: 065
OD_ADD: +2.50
OS_ADD: +2.50
OD_AXIS: 110
OD_CYLINDER: -1.25
OS_SPHERE: +2.00
OD_SPHERE: +1.75

## 2024-06-18 ASSESSMENT — SLIT LAMP EXAM - LIDS
COMMENTS: NORMAL
COMMENTS: NORMAL

## 2024-06-18 ASSESSMENT — EXTERNAL EXAM - LEFT EYE: OS_EXAM: NORMAL

## 2024-07-13 RX ORDER — MONTELUKAST SODIUM 10 MG/1
10 TABLET ORAL DAILY
Qty: 90 TABLET | Refills: 2 | Status: SHIPPED | OUTPATIENT
Start: 2024-07-13

## 2024-07-17 ENCOUNTER — APPOINTMENT (OUTPATIENT)
Dept: ENDOCRINOLOGY | Facility: CLINIC | Age: 74
End: 2024-07-17
Payer: MEDICARE

## 2024-07-18 RX ORDER — BUDESONIDE AND FORMOTEROL FUMARATE DIHYDRATE 160; 4.5 UG/1; UG/1
2 AEROSOL RESPIRATORY (INHALATION) 2 TIMES DAILY
Qty: 3 EACH | Refills: 1 | Status: SHIPPED | OUTPATIENT
Start: 2024-07-18 | End: 2024-07-19 | Stop reason: SDUPTHER

## 2024-07-19 RX ORDER — BUDESONIDE AND FORMOTEROL FUMARATE DIHYDRATE 160; 4.5 UG/1; UG/1
2 AEROSOL RESPIRATORY (INHALATION) 2 TIMES DAILY
Qty: 3 EACH | Refills: 1 | Status: SHIPPED | OUTPATIENT
Start: 2024-07-19

## 2024-07-22 ENCOUNTER — LAB (OUTPATIENT)
Dept: LAB | Facility: LAB | Age: 74
End: 2024-07-22
Payer: MEDICARE

## 2024-07-22 ENCOUNTER — HOSPITAL ENCOUNTER (OUTPATIENT)
Dept: RADIOLOGY | Facility: CLINIC | Age: 74
Discharge: HOME | End: 2024-07-22
Payer: MEDICARE

## 2024-07-22 DIAGNOSIS — E23.0 HYPOPITUITARISM (MULTI): ICD-10-CM

## 2024-07-22 LAB
ALBUMIN SERPL BCP-MCNC: 4.1 G/DL (ref 3.4–5)
ANION GAP SERPL CALC-SCNC: 13 MMOL/L (ref 10–20)
BUN SERPL-MCNC: 15 MG/DL (ref 6–23)
CALCIUM SERPL-MCNC: 9.2 MG/DL (ref 8.6–10.6)
CHLORIDE SERPL-SCNC: 99 MMOL/L (ref 98–107)
CO2 SERPL-SCNC: 28 MMOL/L (ref 21–32)
CREAT SERPL-MCNC: 0.78 MG/DL (ref 0.5–1.3)
EGFRCR SERPLBLD CKD-EPI 2021: >90 ML/MIN/1.73M*2
GLUCOSE SERPL-MCNC: 84 MG/DL (ref 74–99)
PHOSPHATE SERPL-MCNC: 3.6 MG/DL (ref 2.5–4.9)
POTASSIUM SERPL-SCNC: 5 MMOL/L (ref 3.5–5.3)
SODIUM SERPL-SCNC: 135 MMOL/L (ref 136–145)
T4 FREE SERPL-MCNC: 1.45 NG/DL (ref 0.78–1.48)

## 2024-07-22 PROCEDURE — 2550000001 HC RX 255 CONTRASTS: Performed by: STUDENT IN AN ORGANIZED HEALTH CARE EDUCATION/TRAINING PROGRAM

## 2024-07-22 PROCEDURE — A9575 INJ GADOTERATE MEGLUMI 0.1ML: HCPCS | Performed by: STUDENT IN AN ORGANIZED HEALTH CARE EDUCATION/TRAINING PROGRAM

## 2024-07-22 PROCEDURE — 36415 COLL VENOUS BLD VENIPUNCTURE: CPT

## 2024-07-22 PROCEDURE — 70553 MRI BRAIN STEM W/O & W/DYE: CPT

## 2024-07-22 PROCEDURE — 80069 RENAL FUNCTION PANEL: CPT

## 2024-07-22 PROCEDURE — 84439 ASSAY OF FREE THYROXINE: CPT

## 2024-07-22 PROCEDURE — 84402 ASSAY OF FREE TESTOSTERONE: CPT

## 2024-07-22 PROCEDURE — 70553 MRI BRAIN STEM W/O & W/DYE: CPT | Performed by: RADIOLOGY

## 2024-07-22 RX ORDER — GADOTERATE MEGLUMINE 376.9 MG/ML
14 INJECTION INTRAVENOUS
Status: COMPLETED | OUTPATIENT
Start: 2024-07-22 | End: 2024-07-22

## 2024-07-26 LAB
TESTOSTERONE FREE (CHAN): 54.8 PG/ML (ref 30–135)
TESTOSTERONE,TOTAL,LC-MS/MS: 425 NG/DL (ref 250–1100)

## 2024-07-30 ENCOUNTER — APPOINTMENT (OUTPATIENT)
Dept: ENDOCRINOLOGY | Facility: CLINIC | Age: 74
End: 2024-07-30
Payer: MEDICARE

## 2024-07-30 DIAGNOSIS — E29.1 HYPOGONADISM MALE: ICD-10-CM

## 2024-07-30 DIAGNOSIS — E23.0 HYPOPITUITARISM (MULTI): ICD-10-CM

## 2024-07-30 RX ORDER — TESTOSTERONE CYPIONATE 200 MG/ML
100 INJECTION, SOLUTION INTRAMUSCULAR
Qty: 2 ML | Refills: 3 | Status: SHIPPED | OUTPATIENT
Start: 2024-07-30 | End: 2025-03-11

## 2024-07-30 RX ORDER — SYRINGE W-NEEDLE,DISPOSAB,3 ML 25GX5/8"
SYRINGE, EMPTY DISPOSABLE MISCELLANEOUS
Qty: 20 EACH | Refills: 3 | Status: SHIPPED | OUTPATIENT
Start: 2024-07-30

## 2024-08-06 ENCOUNTER — APPOINTMENT (OUTPATIENT)
Dept: ENDOCRINOLOGY | Facility: HOSPITAL | Age: 74
End: 2024-08-06
Payer: MEDICARE

## 2024-09-12 ENCOUNTER — APPOINTMENT (OUTPATIENT)
Dept: ENDOCRINOLOGY | Facility: CLINIC | Age: 74
End: 2024-09-12
Payer: MEDICARE

## 2024-09-12 ENCOUNTER — OFFICE VISIT (OUTPATIENT)
Dept: ENDOCRINOLOGY | Facility: CLINIC | Age: 74
End: 2024-09-12
Payer: MEDICARE

## 2024-09-12 ENCOUNTER — LAB (OUTPATIENT)
Dept: LAB | Facility: LAB | Age: 74
End: 2024-09-12
Payer: MEDICARE

## 2024-09-12 VITALS
WEIGHT: 166 LBS | DIASTOLIC BLOOD PRESSURE: 90 MMHG | BODY MASS INDEX: 25.24 KG/M2 | HEART RATE: 61 BPM | TEMPERATURE: 97.4 F | SYSTOLIC BLOOD PRESSURE: 154 MMHG

## 2024-09-12 DIAGNOSIS — E23.0 HYPOPITUITARISM (MULTI): Primary | ICD-10-CM

## 2024-09-12 DIAGNOSIS — E23.0 HYPOPITUITARISM (MULTI): ICD-10-CM

## 2024-09-12 DIAGNOSIS — E29.1 HYPOGONADISM MALE: ICD-10-CM

## 2024-09-12 PROCEDURE — 80069 RENAL FUNCTION PANEL: CPT

## 2024-09-12 PROCEDURE — 1126F AMNT PAIN NOTED NONE PRSNT: CPT | Performed by: STUDENT IN AN ORGANIZED HEALTH CARE EDUCATION/TRAINING PROGRAM

## 2024-09-12 PROCEDURE — 99215 OFFICE O/P EST HI 40 MIN: CPT | Performed by: STUDENT IN AN ORGANIZED HEALTH CARE EDUCATION/TRAINING PROGRAM

## 2024-09-12 PROCEDURE — 36415 COLL VENOUS BLD VENIPUNCTURE: CPT

## 2024-09-12 PROCEDURE — 1159F MED LIST DOCD IN RCRD: CPT | Performed by: STUDENT IN AN ORGANIZED HEALTH CARE EDUCATION/TRAINING PROGRAM

## 2024-09-12 ASSESSMENT — PAIN SCALES - GENERAL: PAINLEVEL: 0-NO PAIN

## 2024-09-12 NOTE — PATIENT INSTRUCTIONS
Renal blood work today  Continue desmopressin 0.2 mg twice daily  Continue HC and levothyroxine  Take HC 3rd dose at 6 pm  Continue testosterone  Neurosurgery referral  Follow with neurology    RTC in 3 months

## 2024-09-12 NOTE — PROGRESS NOTES
Subjective   Patient ID: Martinez Potter is a 73 y.o. male who presents for Pituitary Problem.  HPI  Mr. 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 Last TEstosterone lab was 763 6 days after injection   Levothyroxine 150 mcg daily. TAkes all meds together  DDAVP 0.2 mg in am and 0.2 mg in pm.  Usually does not wake up at night   Was taking salt but now using substitute salt   Prednisone 5 mg in am and 2.5 mg in the evening before bed previously now on Hc   Had a gall bladder surgery  Had hyponatremia and they stopped DDAVP and started having polyuria. Endocrine were consulted and adjusted his meds.      MRI Brain SWG showed an enhancing mass of the pituitary slightly to the right of midline measuring 1.4 x 1.6 x 1.2 cm. The sella is expanded, pituitary stalk is obscured. There is an 11 mm fluid-filled nonenhancing lesion of the jose star  Labs SWG  February 2023 showed a prolactin of 2.5 testosterone 1499 cortisol random 3.69 vitamin D 25 IGF-I 77  April 2023 showed a sodium of 138 potassium of 3.9 creatinine of 1.1  Free T4 was 1.32, testosterone total was 990   6/8/2023 MRI sella  Changes of transsphenoidal mass resection are again noted. Multilobulated T2 hyperintense enhancing soft tissue in the postoperative sella and left cavernous sinus measures approximately 2.2 x 1.5 x 1.5 cm (coronal postcontrast image 7 and sagittal postcontrast image 7). Comparison is difficult given differences in technique, however this appears slightly increased from  2015. The infundibulum is not visualized. The mass abuts the optic chiasm and right greater than left cisternal segments of the optic nerves.   Saw Dr. Brojas and he reported no compression on exam  Last testosterone level 763  Started Hydroxychloroquine. Switched to methotrexate injection   Took prednisone taper in January.  Currently on levothyroxine 150 mcg daily FT4: 1.04  Hydrocortisone 10 mg in am, 7.5 mg at 1 pm and 2.5 mg in the afternoon  DDAVP 0.2 mg in the morning and 0.2 mg at night  Testosterone 200 mg/ml 0.3 mL every 2 weeks 60mg   Repeat MRI in July 2024   1. Stable residual tumor located within the sella and suprasellar  cistern and in the left cavernous sinus.   2. Tumor again abuts the inferior surface of the optic chiasm and  there is stable probable thinning of the optic chiasm.      Dizziness started when they held his DDAVP in April 2023  Has cataracts  Saw Dr. Borjas in June and has a scheduled apt  Has been having frequent falls  Especially  if he stands up and go he would fall  No recent LOC  Last couple weeks has been worse with imbalance   Dx with essential tremor started on treatment now improved  Energy: Good       Latest Reference Range & Units 07/22/24 09:13   Thyroxine, Free 0.78 - 1.48 ng/dL 1.45   Testosterone, Free 30.0 - 135.0 pg/mL 54.8   GLUCOSE 74 - 99 mg/dL 84   Testosterone, Total, LC-MS/ - 1100 ng/dL 425     Review of Systems  all pertinent systems reviewed and are otherwise negative   Objective   Visit Vitals  /90 (BP Location: Right arm, Patient Position: Sitting, BP Cuff Size: Adult)   Pulse 61   Temp 36.3 °C (97.4 °F)   Wt 75.3 kg (166 lb)   BMI 25.24 kg/m²   Smoking Status Never   BSA 1.9 m²      Physical Exam  Constitutional:       General: He is not in acute distress.     Appearance: Normal appearance.   Eyes:      Extraocular Movements: Extraocular movements intact.      Pupils: Pupils are equal, round, and reactive to light.   Cardiovascular:      Rate  and Rhythm: Normal rate and regular rhythm.   Pulmonary:      Effort: Pulmonary effort is normal. No respiratory distress.      Breath sounds: Normal breath sounds.   Abdominal:      General: Bowel sounds are normal.      Palpations: Abdomen is soft.      Tenderness: There is no abdominal tenderness.   Skin:     Coloration: Skin is not jaundiced or pale.      Findings: No erythema or rash.   Neurological:      General: No focal deficit present.      Mental Status: He is alert and oriented to person, place, and time.      Deep Tendon Reflexes: Reflexes normal.   Psychiatric:         Mood and Affect: Mood normal.         Behavior: Behavior normal.         Assessment/Plan   Mr. 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 Last TEstosterone lab was 763 6 days after injection   Levothyroxine 150 mcg daily. TAkes all meds together  DDAVP 0.2 mg in am and 0.2 mg in pm.  Usually does not wake up at night   Was taking salt but now using substitute salt   Prednisone 5 mg in am and 2.5 mg in the evening before bed previously now on Hc   Had a gall bladder surgery  Had hyponatremia and they stopped DDAVP and started having polyuria. Endocrine were consulted and adjusted his meds.      MRI Brain SWG showed an enhancing mass of the pituitary slightly to the right of midline measuring 1.4 x 1.6 x 1.2 cm. The sella is expanded, pituitary stalk is obscured. There is an 11 mm fluid-filled nonenhancing lesion of the jose star  Labs SWG  February 2023 showed a prolactin  of 2.5 testosterone 1499 cortisol random 3.69 vitamin D 25 IGF-I 77  April 2023 showed a sodium of 138 potassium of 3.9 creatinine of 1.1  Free T4 was 1.32, testosterone total was 990   6/8/2023 MRI sella  Changes of transsphenoidal mass resection are again noted. Multilobulated T2 hyperintense enhancing soft tissue in the postoperative sella and left cavernous sinus measures approximately 2.2 x 1.5 x 1.5 cm (coronal postcontrast image 7 and sagittal postcontrast image 7). Comparison is difficult given differences in technique, however this appears slightly increased from 2015. The infundibulum is not visualized. The mass abuts the optic chiasm and right greater than left cisternal segments of the optic nerves.   Saw Dr. Borjas and he reported no compression on exam  Last testosterone level 763  Started Hydroxychloroquine. Switched to methotrexate injection   Took prednisone taper in January.  Currently on levothyroxine 150 mcg daily FT4: 1.04  Hydrocortisone 10 mg in am, 7.5 mg at 1 pm and 2.5 mg in the afternoon  DDAVP 0.2 mg in the morning and 0.2 mg at night  Testosterone 200 mg/ml 0.3 mL every 2 weeks 60mg   Repeat MRI in July 2024   1. Stable residual tumor located within the sella and suprasellar  cistern and in the left cavernous sinus.   2. Tumor again abuts the inferior surface of the optic chiasm and  there is stable probable thinning of the optic chiasm.      Dizziness started when they held his DDAVP in April 2023  Has cataracts  Saw Dr. Borjas in June and has a scheduled apt  Has been having frequent falls  Especially  if he stands up and go he would fall  No recent LOC  Last couple weeks has been worse with imbalance   Dx with essential tremor started on treatment now improved  Energy: Good  Plan:  Renal blood work today  Continue desmopressin 0.2 mg twice daily  Continue HC and levothyroxine  Take HC 3rd dose at 6 pm  Continue testosterone  Neurosurgery referral  Follow with neurology    RTC in 3  months  Assessment & Plan  Hypopituitarism (Multi)    Orders:    Renal Function Panel; Future    Referral to Neurosurgery; Future    Hypogonadism male                  Isabel Faustin MD 09/12/24 3:30 PM

## 2024-09-13 ENCOUNTER — TELEPHONE (OUTPATIENT)
Dept: ENDOCRINOLOGY | Facility: CLINIC | Age: 74
End: 2024-09-13
Payer: MEDICARE

## 2024-09-13 LAB
ALBUMIN SERPL BCP-MCNC: 4.2 G/DL (ref 3.4–5)
ANION GAP SERPL CALC-SCNC: 12 MMOL/L (ref 10–20)
BUN SERPL-MCNC: 20 MG/DL (ref 6–23)
CALCIUM SERPL-MCNC: 9.4 MG/DL (ref 8.6–10.6)
CHLORIDE SERPL-SCNC: 103 MMOL/L (ref 98–107)
CO2 SERPL-SCNC: 29 MMOL/L (ref 21–32)
CREAT SERPL-MCNC: 0.92 MG/DL (ref 0.5–1.3)
EGFRCR SERPLBLD CKD-EPI 2021: 88 ML/MIN/1.73M*2
GLUCOSE SERPL-MCNC: 82 MG/DL (ref 74–99)
PHOSPHATE SERPL-MCNC: 4.1 MG/DL (ref 2.5–4.9)
POTASSIUM SERPL-SCNC: 5.2 MMOL/L (ref 3.5–5.3)
SODIUM SERPL-SCNC: 139 MMOL/L (ref 136–145)

## 2024-09-13 NOTE — TELEPHONE ENCOUNTER
Left voicemail to give attached message from provider. Office number was given for a call back in case of any questions or concerns.     Marie Paredes RN   ----- Message from Isabel Faustin sent at 9/13/2024 12:10 PM EDT -----  Please inform patient that sodium improved to 139 continue desmopressin 0.2 mg twice daily

## 2024-09-25 ENCOUNTER — OFFICE VISIT (OUTPATIENT)
Dept: URGENT CARE | Age: 74
End: 2024-09-25
Payer: MEDICARE

## 2024-09-25 VITALS
BODY MASS INDEX: 26.21 KG/M2 | HEIGHT: 67 IN | WEIGHT: 167 LBS | SYSTOLIC BLOOD PRESSURE: 149 MMHG | DIASTOLIC BLOOD PRESSURE: 89 MMHG | HEART RATE: 60 BPM | RESPIRATION RATE: 16 BRPM | TEMPERATURE: 98 F | OXYGEN SATURATION: 97 %

## 2024-09-25 DIAGNOSIS — L08.9 WOUND INFECTION: Primary | ICD-10-CM

## 2024-09-25 DIAGNOSIS — T14.8XXA WOUND INFECTION: Primary | ICD-10-CM

## 2024-09-25 DIAGNOSIS — L03.115 CELLULITIS OF RIGHT LOWER EXTREMITY: ICD-10-CM

## 2024-09-25 PROCEDURE — 87075 CULTR BACTERIA EXCEPT BLOOD: CPT

## 2024-09-25 PROCEDURE — 87205 SMEAR GRAM STAIN: CPT

## 2024-09-25 PROCEDURE — 87070 CULTURE OTHR SPECIMN AEROBIC: CPT

## 2024-09-25 RX ORDER — MUPIROCIN 20 MG/G
OINTMENT TOPICAL 2 TIMES DAILY
Qty: 22 G | Refills: 0 | Status: SHIPPED | OUTPATIENT
Start: 2024-09-25 | End: 2024-10-02

## 2024-09-25 RX ORDER — CEPHALEXIN 500 MG/1
500 CAPSULE ORAL 2 TIMES DAILY
Qty: 14 CAPSULE | Refills: 0 | Status: SHIPPED | OUTPATIENT
Start: 2024-09-25 | End: 2024-10-02

## 2024-09-25 ASSESSMENT — ENCOUNTER SYMPTOMS
WOUND: 1
CHILLS: 0
COLOR CHANGE: 1
ROS SKIN COMMENTS: SEE HPI
FEVER: 0

## 2024-09-25 ASSESSMENT — PAIN SCALES - GENERAL: PAINLEVEL: 4

## 2024-09-25 NOTE — PROGRESS NOTES
"Subjective   Patient ID: Martinez Potter \"Steven\" is a 73 y.o. male. They present today with a chief complaint of Injury (Sore on right leg for one month).    History of Present Illness  -right lower leg cut by 2x4 about 1 month ago  -he has been using bacitracin to prevent infection   -about 1 week ago center started to look black and there is redness around the wound now  -previously on steroids but not for awhile now  -he is on methotrexate for RA  -he denies fever, chills, nausea, vomiting or other complaints  -denies allergies to antibiotics       Injury  Associated symptoms: no chest pain and no fever        Past Medical History  Allergies as of 09/25/2024 - Reviewed 09/25/2024   Allergen Reaction Noted    Pollen extracts Other 03/21/2024       (Not in a hospital admission)       Past Medical History:   Diagnosis Date    Acute bronchospasm 09/28/2018    Cough due to bronchospasm    Candidal stomatitis 05/28/2018    Thrush    Contusion of right front wall of thorax, initial encounter 03/25/2021    Rib contusion, right, initial encounter    Cramp and spasm 06/18/2021    Muscle cramps    Encounter for other general examination     Rectal exam    Other injury of unspecified body region, initial encounter 08/07/2015    Musculoskeletal strain    Other specified disorders of left ear 01/03/2020    Sensation of fullness in left ear    Other specified disorders of temporomandibular joint 01/29/2020    TMJ inflammation    Other specified symptoms and signs involving the circulatory and respiratory systems 09/28/2018    Chest sounds abnormal on percussion or auscultation    Otitis media, unspecified, left ear 10/04/2019    Acute left otitis media    Personal history of other diseases of male genital organs     History of erectile dysfunction    Personal history of other diseases of the nervous system and sense organs 02/10/2017    History of tinnitus    Personal history of other diseases of the respiratory system 09/28/2018 "    History of acute sinusitis    Personal history of other diseases of the respiratory system 10/04/2019    History of acute sinusitis    Personal history of other diseases of the respiratory system 12/19/2019    History of paranasal sinus congestion    Personal history of other specified conditions     History of pituitary neoplasm    Personal history of other specified conditions 02/16/2021    History of epistaxis    Personal history of other specified conditions 11/28/2019    History of facial pain    Personal history of other specified conditions 11/28/2019    History of postnasal drip    Personal history of other specified conditions 12/19/2019    History of persistent cough    Personal history of other specified conditions 09/28/2018    History of persistent cough    Personal history of other specified conditions 09/28/2018    History of hoarseness    Personal history of other specified conditions 09/28/2018    History of vertigo    Spinal stenosis, lumbosacral region 10/07/2021    Lumbosacral spinal stenosis    Tinnitus, left ear 01/29/2020    Tinnitus of left ear    Unspecified injury of thorax, initial encounter 03/25/2021    Rib injury       Past Surgical History:   Procedure Laterality Date    OTHER SURGICAL HISTORY  03/13/2018    Excision Of Pituitary Gland Partial    OTHER SURGICAL HISTORY  11/27/2019    Sinus surgery    TONSILLECTOMY  03/13/2018    Tonsillectomy    TOTAL KNEE ARTHROPLASTY  03/13/2018    Knee Replacement        reports that he has never smoked. He has never used smokeless tobacco. He reports current alcohol use. He reports that he does not use drugs.    Review of Systems  Review of Systems   Constitutional:  Negative for chills and fever.   Cardiovascular:  Negative for chest pain.   Skin:  Positive for color change and wound.        SEE HPI   All other systems reviewed and are negative.        Objective    Vitals:    09/25/24 1005   BP: 149/89   BP Location: Left arm   Patient  "Position: Sitting   BP Cuff Size: Large adult   Pulse: 60   Resp: 16   Temp: 36.7 °C (98 °F)   TempSrc: Oral   SpO2: 97%   Weight: 75.8 kg (167 lb)   Height: 1.702 m (5' 7\")     No LMP for male patient.    Physical Exam  Vitals reviewed.   Constitutional:       Appearance: Normal appearance.   Skin:     General: Skin is warm and dry.      Findings: Wound present.          Neurological:      Mental Status: He is alert.         Procedures    Point of Care Test & Imaging Results from this visit  No results found for this visit on 09/25/24.   No results found.    Diagnostic study results (if any) were reviewed by Ivanna Gambino PA-C.    Assessment/Plan   Allergies, medications, history, and pertinent labs/EKGs/Imaging reviewed by Ivanna Gambino PA-C.     Medical Decision Making  Wound on his lower R leg from 2x4 approximately 1 month ago.  Now with erythema and some necrotic borders.  He is on methotrexate and is immunocompromised.  He denies allergies to antibiotics.  Wound culture collected. Will start on keflex and mupirocin and refer to wound care clinic.  Advised to monitor for signs of worsening infection.      At time of discharge patient was clinically well-appearing and HDS for outpatient management. The patient and/or family was educated regarding diagnosis, supportive care, OTC and Rx medications. The patient and/or family was given the opportunity to ask questions prior to discharge.  They verbalized understanding of my discussion of the plans for treatment, expected course, indications to return to UC or seek further evaluation in ED, and the need for timely follow up as directed.   They were provided with a work/school excuse if requested.      Orders and Diagnoses  Diagnoses and all orders for this visit:  Wound infection  -     Tissue/Wound Culture/Smear  -     cephalexin (Keflex) 500 mg capsule; Take 1 capsule (500 mg) by mouth 2 times a day for 7 days.  -     mupirocin (Bactroban) 2 % ointment; " Apply topically 2 times a day for 7 days.  -     Referral to Wound Clinic; Future  Cellulitis of right lower extremity  -     Tissue/Wound Culture/Smear  -     cephalexin (Keflex) 500 mg capsule; Take 1 capsule (500 mg) by mouth 2 times a day for 7 days.  -     mupirocin (Bactroban) 2 % ointment; Apply topically 2 times a day for 7 days.      Medical Admin Record      Patient disposition: Home    Electronically signed by Ivanna Gambino PA-C  3:54 PM

## 2024-09-25 NOTE — PATIENT INSTRUCTIONS
-Keep the wound clean and dry.  -Change any dressings as told by your health care provider. This includes changing the dressing when it starts to smell, or when it gets wet or dirty.  -Clean the wound 2 times daily with soap and water.   -Pat the wound dry with a clean towel. Do not rub the wound.   -Apply a thin layer of antibiotic ointment or another topical ointment to the wound as told by your health care provider. This will prevent infection and keep the dressing from sticking to the wound.   -You can leave wound open to air.  You may cover the wound with Band-Aid or other non-adhesive dressing.    -Do not take baths, swim, or do anything that puts your wound underwater until your sutures are removed.  -Do not scratch or pick at the wound.  -Do not use disinfectants or antiseptics, such as rubbing alcohol, to clean your wound unless told by your health care provider.    Check your wound every day for signs of infection. Watch for:  -Increased redness, swelling, or pain around your wound.  -Fluid or blood coming from your wound.  -Warmth coming from your wound.  -Pus or a bad smell coming from your wound.  -A fever > 100.4 F  -You notice something coming out of the wound, such as wood or glass.  -You notice a change in the color of your skin near your wound.  -You develop a new rash.  -You need to change the dressing often.  -You develop numbness around your wound.  The wound is on your hand or foot, and you cannot properly move a finger or toe.  The wound is on your hand or foot, and you notice that your fingers or toes look pale or bluish.  You have a red streak going away from your wound.  You develop painful lumps near the wound or on skin anywhere else on your body.

## 2024-09-26 ENCOUNTER — OFFICE VISIT (OUTPATIENT)
Dept: WOUND CARE | Facility: CLINIC | Age: 74
End: 2024-09-26
Payer: MEDICARE

## 2024-09-26 DIAGNOSIS — L08.9 WOUND INFECTION: ICD-10-CM

## 2024-09-26 DIAGNOSIS — T14.8XXA WOUND INFECTION: ICD-10-CM

## 2024-09-26 PROCEDURE — 11042 DBRDMT SUBQ TIS 1ST 20SQCM/<: CPT

## 2024-09-28 LAB
BACTERIA SPEC CULT: NORMAL
GRAM STN SPEC: NORMAL
GRAM STN SPEC: NORMAL

## 2024-10-01 ENCOUNTER — APPOINTMENT (OUTPATIENT)
Dept: OPHTHALMOLOGY | Facility: CLINIC | Age: 74
End: 2024-10-01
Payer: MEDICARE

## 2024-10-01 DIAGNOSIS — H53.47 HETERONYMOUS BILATERAL FIELD DEFECTS IN VISUAL FIELD: Primary | ICD-10-CM

## 2024-10-01 DIAGNOSIS — D35.2 SUPRASELLAR EXTENSION OF PITUITARY ADENOMA (MULTI): ICD-10-CM

## 2024-10-01 PROCEDURE — 99214 OFFICE O/P EST MOD 30 MIN: CPT | Performed by: PSYCHIATRY & NEUROLOGY

## 2024-10-01 PROCEDURE — 92133 CPTRZD OPH DX IMG PST SGM ON: CPT | Performed by: PSYCHIATRY & NEUROLOGY

## 2024-10-01 PROCEDURE — 92083 EXTENDED VISUAL FIELD XM: CPT | Performed by: PSYCHIATRY & NEUROLOGY

## 2024-10-01 ASSESSMENT — ENCOUNTER SYMPTOMS
ENDOCRINE NEGATIVE: 1
ALLERGIC/IMMUNOLOGIC NEGATIVE: 0
RESPIRATORY NEGATIVE: 0
CARDIOVASCULAR NEGATIVE: 0
CONSTITUTIONAL NEGATIVE: 0
EYES NEGATIVE: 1
GASTROINTESTINAL NEGATIVE: 0
HEMATOLOGIC/LYMPHATIC NEGATIVE: 0
PSYCHIATRIC NEGATIVE: 0
MUSCULOSKELETAL NEGATIVE: 0

## 2024-10-01 ASSESSMENT — VISUAL ACUITY
OD_CC: 20/30
OS_CC: 20/25
METHOD: SNELLEN - LINEAR

## 2024-10-01 ASSESSMENT — EXTERNAL EXAM - RIGHT EYE: OD_EXAM: NORMAL

## 2024-10-01 ASSESSMENT — CUP TO DISC RATIO
OS_RATIO: 0.2
OD_RATIO: 0.2

## 2024-10-01 ASSESSMENT — TONOMETRY
IOP_METHOD: GOLDMANN APPLANATION
OS_IOP_MMHG: 16
OD_IOP_MMHG: 16

## 2024-10-01 ASSESSMENT — SLIT LAMP EXAM - LIDS
COMMENTS: NORMAL
COMMENTS: NORMAL

## 2024-10-01 ASSESSMENT — EXTERNAL EXAM - LEFT EYE: OS_EXAM: NORMAL

## 2024-10-01 NOTE — PROGRESS NOTES
Assessment and Plan    07/22/2024 MRI sella with contrast, which I personally reviewed, shows enhancing mass of the right sellar contacting the anterior optic chiasm and right optic nerve as well as a possibly separate mass affecting the left cavernous sinus.    06/08/2023 MRI sella with contrast, which I personally reviewed previously, shows a hetereogenously enhancing sellar mass contacting the anterior optic chiasm.  Previous head imaging.    10/01/2024 OCT RNFL OD 76 & OS 69. (Stable)  06/18/2024 OCT RNFL OD 76 & OS 68. (Higher OD & stable OS)  12/06/2023 OCT RNFL OD 68 with T & I thinning & OS 67 with T & borderline S thinning. (Cirrus)    10/01/2024 HVF 24-2 OD fovea 39, only on pattern temporal defects MD +0.07 & OS fovea 34, subtle inferior > superior temporal defects MD +0.38.  06/18/2024 HVF 24-2 OD fovea 37, temporal depression, more evident on pattern MD +0.84 & OS fovea 35, temporal depression, more evident on pattern MD +0.41.  12/06/2023 HVF 24-2 OD fovea 36, wnl MD +0.64 & OS fovea 34, temporal rim artifact versus depression MD -1.99.    This 73 year-old man with a history of pituitary tumor status post resection 1980s complicated by hypopituitarism with hypogonadism, adrenal insufficiency and hypothyroidism, essential tremor, rheumatoid arthritis, HTN, asthma presents in follow up for evaluation of visual fields.    The subtle evidence of bitemporal vision loss looks similar today on testing. I certainly do not see a major change. MRI looks similar as well. I recommend continued surveillance with imaging and Rico visual field (HVF) testing. We discussed that surgery is an option, and he will meet with Dr. Ceron. I do not see definite rapidly advancing vision loss to push toward surgery in a more immediate time frame.    Regarding hydroxychloroquine, I recommend monitoring with primary eye care or retina.    Plan    Surveillance and management of hypopituitarism.    Follow up in 6-9 months with HVF  & OCT. (Dilated 12/6/2023)

## 2024-10-03 ENCOUNTER — OFFICE VISIT (OUTPATIENT)
Dept: WOUND CARE | Facility: CLINIC | Age: 74
End: 2024-10-03
Payer: MEDICARE

## 2024-10-03 PROCEDURE — 11042 DBRDMT SUBQ TIS 1ST 20SQCM/<: CPT

## 2024-10-04 ENCOUNTER — HOSPITAL ENCOUNTER (OUTPATIENT)
Dept: RADIOLOGY | Facility: CLINIC | Age: 74
Discharge: HOME | End: 2024-10-04
Payer: MEDICARE

## 2024-10-04 DIAGNOSIS — M06.09 RHEUMATOID ARTHRITIS WITHOUT RHEUMATOID FACTOR, MULTIPLE SITES (MULTI): ICD-10-CM

## 2024-10-04 DIAGNOSIS — M65.90 SYNOVITIS AND TENOSYNOVITIS: ICD-10-CM

## 2024-10-04 PROCEDURE — 73221 MRI JOINT UPR EXTREM W/O DYE: CPT | Mod: RT

## 2024-10-08 PROBLEM — M96.1 LUMBAR POST-LAMINECTOMY SYNDROME: Status: ACTIVE | Noted: 2023-11-07

## 2024-10-08 PROBLEM — I10 HTN (HYPERTENSION): Status: ACTIVE | Noted: 2023-12-01

## 2024-10-08 PROBLEM — E23.0 PANHYPOPITUITARISM (MULTI): Status: ACTIVE | Noted: 2024-10-08

## 2024-10-08 PROBLEM — M48.07 LUMBOSACRAL SPINAL STENOSIS: Status: ACTIVE | Noted: 2024-10-08

## 2024-10-08 PROBLEM — E29.1 HYPOGONADISM IN MALE: Status: ACTIVE | Noted: 2024-10-08

## 2024-10-08 PROBLEM — E27.49 SECONDARY HYPOCORTISOLISM (MULTI): Status: ACTIVE | Noted: 2024-10-08

## 2024-10-08 PROBLEM — J38.02 BILATERAL VOCAL CORD PARESIS: Status: ACTIVE | Noted: 2024-10-08

## 2024-10-08 PROBLEM — E03.8 SECONDARY HYPOTHYROIDISM: Status: ACTIVE | Noted: 2024-10-08

## 2024-10-08 PROBLEM — R49.0 HOARSENESS OF VOICE: Status: ACTIVE | Noted: 2024-10-08

## 2024-10-08 PROBLEM — K63.5 COLON POLYPS: Status: ACTIVE | Noted: 2024-10-08

## 2024-10-08 PROBLEM — M25.531 PAIN IN RIGHT WRIST: Status: ACTIVE | Noted: 2023-11-07

## 2024-10-08 PROBLEM — J34.2 DEVIATED NASAL SEPTUM: Status: ACTIVE | Noted: 2024-10-08

## 2024-10-08 PROBLEM — D69.2 SOLAR PURPURA (CMS-HCC): Status: ACTIVE | Noted: 2021-08-10

## 2024-10-08 PROBLEM — M46.1 SACROILIITIS, NOT ELSEWHERE CLASSIFIED (CMS-HCC): Status: ACTIVE | Noted: 2024-06-25

## 2024-10-08 PROBLEM — S51.811A LACERATION OF RIGHT FOREARM: Status: ACTIVE | Noted: 2024-10-08

## 2024-10-08 PROBLEM — R13.19 CERVICAL DYSPHAGIA: Status: ACTIVE | Noted: 2024-10-08

## 2024-10-08 PROBLEM — E55.9 VITAMIN D DEFICIENCY: Status: ACTIVE | Noted: 2024-10-08

## 2024-10-08 PROBLEM — G89.29 CHRONIC BACK PAIN: Status: ACTIVE | Noted: 2023-11-07

## 2024-10-08 PROBLEM — M25.521 ELBOW PAIN, RIGHT: Status: ACTIVE | Noted: 2023-11-07

## 2024-10-08 PROBLEM — D49.7 PITUITARY TUMOR: Status: ACTIVE | Noted: 2024-06-25

## 2024-10-08 PROBLEM — R42 VERTIGO: Status: ACTIVE | Noted: 2024-10-08

## 2024-10-08 PROBLEM — R09.81 CONGESTION OF PARANASAL SINUS: Status: ACTIVE | Noted: 2024-10-08

## 2024-10-08 PROBLEM — R55 SYNCOPE AND COLLAPSE: Status: ACTIVE | Noted: 2022-07-21

## 2024-10-08 PROBLEM — J01.90 ACUTE SINUSITIS: Status: ACTIVE | Noted: 2024-10-08

## 2024-10-08 PROBLEM — R35.1 NOCTURIA: Status: ACTIVE | Noted: 2024-10-08

## 2024-10-08 PROBLEM — M25.511 PAIN IN RIGHT SHOULDER: Status: ACTIVE | Noted: 2023-11-07

## 2024-10-08 PROBLEM — R07.9 CHEST PAIN: Status: ACTIVE | Noted: 2024-10-08

## 2024-10-08 PROBLEM — R06.09 DYSPNEA ON EXERTION: Status: ACTIVE | Noted: 2022-07-14

## 2024-10-08 PROBLEM — R13.10 DYSPHAGIA: Status: ACTIVE | Noted: 2024-10-08

## 2024-10-08 PROBLEM — M54.9 CHRONIC BACK PAIN: Status: ACTIVE | Noted: 2023-11-07

## 2024-10-08 PROBLEM — R60.0 BILATERAL LEG EDEMA: Status: ACTIVE | Noted: 2024-10-08

## 2024-10-08 PROBLEM — M47.817 LUMBOSACRAL SPONDYLOSIS: Status: ACTIVE | Noted: 2023-11-07

## 2024-10-08 PROBLEM — N40.0 BENIGN PROSTATIC HYPERPLASIA WITHOUT URINARY OBSTRUCTION: Status: ACTIVE | Noted: 2023-11-07

## 2024-10-08 PROBLEM — D35.2 PITUITARY ADENOMA (MULTI): Status: ACTIVE | Noted: 2024-10-08

## 2024-10-08 PROBLEM — J39.2 DISORDER OF PHARYNX: Status: ACTIVE | Noted: 2024-10-08

## 2024-10-08 PROBLEM — H90.42 SENSORINEURAL HEARING LOSS (SNHL) OF LEFT EAR WITH UNRESTRICTED HEARING OF RIGHT EAR: Status: ACTIVE | Noted: 2024-10-08

## 2024-10-08 PROBLEM — I95.1 ORTHOSTATIC HYPOTENSION: Status: ACTIVE | Noted: 2024-10-08

## 2024-10-08 PROBLEM — R05.3 PERSISTENT COUGH: Status: ACTIVE | Noted: 2024-10-08

## 2024-10-08 PROBLEM — R51.9 FACIAL PAIN: Status: ACTIVE | Noted: 2024-10-08

## 2024-10-08 PROBLEM — G89.4 CHRONIC PAIN SYNDROME: Status: ACTIVE | Noted: 2024-06-25

## 2024-10-08 PROBLEM — M54.16 LUMBAR RADICULOPATHY: Status: ACTIVE | Noted: 2024-10-08

## 2024-10-08 PROBLEM — K21.9 GASTROESOPHAGEAL REFLUX DISEASE: Status: ACTIVE | Noted: 2024-10-08

## 2024-10-08 PROBLEM — R04.0 EPISTAXIS: Status: ACTIVE | Noted: 2024-10-08

## 2024-10-08 PROBLEM — I10 BENIGN ESSENTIAL HYPERTENSION: Status: ACTIVE | Noted: 2023-11-07

## 2024-10-08 PROBLEM — N20.1 URETERAL STONE: Status: ACTIVE | Noted: 2024-10-08

## 2024-10-08 PROBLEM — E23.0 HYPOPITUITARISM (MULTI): Status: ACTIVE | Noted: 2024-10-08

## 2024-10-08 PROBLEM — Z86.73 H/O: STROKE: Status: ACTIVE | Noted: 2024-04-10

## 2024-10-08 PROBLEM — J45.909 ASTHMA: Status: ACTIVE | Noted: 2024-10-08

## 2024-10-08 PROBLEM — E23.2 DIABETES INSIPIDUS: Status: ACTIVE | Noted: 2024-10-08

## 2024-10-08 PROBLEM — R07.81 RIB PAIN ON RIGHT SIDE: Status: ACTIVE | Noted: 2024-10-08

## 2024-10-08 PROBLEM — M06.00 SERONEGATIVE RHEUMATOID ARTHRITIS (MULTI): Status: ACTIVE | Noted: 2023-12-07

## 2024-10-08 RX ORDER — SYRINGE-NEEDLE,INSULIN,0.5 ML 27GX1/2"
SYRINGE, EMPTY DISPOSABLE MISCELLANEOUS
COMMUNITY
Start: 2024-01-29

## 2024-10-08 RX ORDER — PREDNISONE 10 MG/1
TABLET ORAL
COMMUNITY
Start: 2023-11-27

## 2024-10-08 RX ORDER — ROSUVASTATIN CALCIUM 10 MG/1
1 TABLET, COATED ORAL
COMMUNITY
Start: 2024-10-02

## 2024-10-08 RX ORDER — RESPIRATORY SYNCYTIAL VISUS VACCINE RECOMBINANT, ADJUVANTED 120MCG/0.5
KIT INTRAMUSCULAR
COMMUNITY
Start: 2023-10-30

## 2024-10-08 RX ORDER — SULFASALAZINE 500 MG/1
TABLET ORAL
COMMUNITY
Start: 2024-09-16

## 2024-10-08 RX ORDER — METHOTREXATE 25 MG/ML
INJECTION INTRA-ARTERIAL; INTRAMUSCULAR; INTRATHECAL; INTRAVENOUS
COMMUNITY
Start: 2024-01-30

## 2024-10-10 ENCOUNTER — OFFICE VISIT (OUTPATIENT)
Dept: WOUND CARE | Facility: CLINIC | Age: 74
End: 2024-10-10
Payer: MEDICARE

## 2024-10-10 PROCEDURE — 11042 DBRDMT SUBQ TIS 1ST 20SQCM/<: CPT

## 2024-10-17 ENCOUNTER — OFFICE VISIT (OUTPATIENT)
Dept: WOUND CARE | Facility: CLINIC | Age: 74
End: 2024-10-17
Payer: MEDICARE

## 2024-10-17 PROCEDURE — 99212 OFFICE O/P EST SF 10 MIN: CPT

## 2024-10-17 NOTE — PROGRESS NOTES
Chief Complaint:   Martinez Potter is a 74 y.o. man here for pituitary adenoma     HPI  Martinez Potter is Referred by Dr. Isabel Faustin with a history of history of pituitary tumor status post resection in the 1980s complicated by hypopituitarism with hypogonadism, adrenal insufficiency and hypothyroidism. Presents today evaluation of enhancing mass of the right sellar contacting the anterior optic chiasm and right optic nerve as well as a possibly separate mass affecting the left cavernous sinus noted on MRI Sella done 7/22/24. Has been seen recently by Dr. Tray Borjas with Neuro-ophthalmology, and Dr. Isabel Faustin with Endocrine. Presents for evaluation of MRI Sella done 7/22/24 and 6/8/23. He also has essential tremor, rheumatoid arthritis, HTN, asthma. His ET symptoms are markedly better after starting propranolol.     Tray Borjas neuro-ophtho eval on 10/1/2024:  subtle evidence of bitemporal vision loss looks similar today on testing. I certainly do not see a major change.     Endocrinology eval by Dr. Faustin 9/12/2024:  Dizziness started when they held his DDAVP in April 2023  Has cataracts  Saw Dr. Borjas in June and has a scheduled apt  Has been having frequent falls  Especially  if he stands up and go he would fall  No recent LOC  Last couple weeks has been worse with imbalance   Dx with essential tremor started on treatment now improved  Energy: Good  Plan:  Renal blood work today  Continue desmopressin 0.2 mg twice daily  Continue HC and levothyroxine  Take HC 3rd dose at 6 pm  Continue testosterone    Review of Systems  All other systems reviewed and negative other than what is already stated in this note.      Past Medical History:   Diagnosis Date    Acute bronchospasm 09/28/2018    Cough due to bronchospasm    Candidal stomatitis 05/28/2018    Thrush    Contusion of right front wall of thorax, initial encounter 03/25/2021    Rib contusion, right, initial encounter    Cramp and spasm 06/18/2021     Muscle cramps    Encounter for other general examination     Rectal exam    Other injury of unspecified body region, initial encounter 08/07/2015    Musculoskeletal strain    Other specified disorders of left ear 01/03/2020    Sensation of fullness in left ear    Other specified disorders of temporomandibular joint 01/29/2020    TMJ inflammation    Other specified symptoms and signs involving the circulatory and respiratory systems 09/28/2018    Chest sounds abnormal on percussion or auscultation    Otitis media, unspecified, left ear 10/04/2019    Acute left otitis media    Personal history of other diseases of male genital organs     History of erectile dysfunction    Personal history of other diseases of the nervous system and sense organs 02/10/2017    History of tinnitus    Personal history of other diseases of the respiratory system 09/28/2018    History of acute sinusitis    Personal history of other diseases of the respiratory system 10/04/2019    History of acute sinusitis    Personal history of other diseases of the respiratory system 12/19/2019    History of paranasal sinus congestion    Personal history of other specified conditions     History of pituitary neoplasm    Personal history of other specified conditions 02/16/2021    History of epistaxis    Personal history of other specified conditions 11/28/2019    History of facial pain    Personal history of other specified conditions 11/28/2019    History of postnasal drip    Personal history of other specified conditions 12/19/2019    History of persistent cough    Personal history of other specified conditions 09/28/2018    History of persistent cough    Personal history of other specified conditions 09/28/2018    History of hoarseness    Personal history of other specified conditions 09/28/2018    History of vertigo    Spinal stenosis, lumbosacral region 10/07/2021    Lumbosacral spinal stenosis    Tinnitus, left ear 01/29/2020    Tinnitus of left  ear    Unspecified injury of thorax, initial encounter 03/25/2021    Rib injury       Patient Active Problem List   Diagnosis    Essential tremor    Hypogonadism in male    Vertigo    Acute sinusitis    AK (actinic keratosis)    Benign essential hypertension    Benign prostatic hyperplasia without urinary obstruction    Bilateral leg edema    Bilateral vocal cord paresis    Chest pain    Chronic back pain    Chronic pain syndrome    Colon polyps    Congestion of paranasal sinus    Deviated nasal septum    Diabetes insipidus    Dyspnea on exertion    Elbow pain, right    Epistaxis    Facial pain    Gastroesophageal reflux disease    H/O: stroke    Hoarseness of voice    HTN (hypertension)    Hypopituitarism (Multi)    Panhypopituitarism (Multi)    Pituitary tumor    Secondary hypothyroidism    Laceration of right forearm    Lumbar post-laminectomy syndrome    Lumbar radiculopathy    Lumbosacral spinal stenosis    Lumbosacral spondylosis    Neoplasm of uncertain behavior of skin    Nocturia    Cervical dysphagia    Disorder of pharynx    Dysphagia    Orthostatic hypotension    Pain in right shoulder    Pain in right wrist    Asthma    Persistent cough    Pituitary adenoma (Multi)    Rib pain on right side    Sacroiliitis, not elsewhere classified (CMS-HCC)    Secondary hypocortisolism (Multi)    Sensorineural hearing loss (SNHL) of left ear with unrestricted hearing of right ear    Seronegative rheumatoid arthritis (Multi)    Solar purpura (CMS-HCC)    Syncope and collapse    Ureteral stone    Vitamin D deficiency       Past Surgical History:   Procedure Laterality Date    OTHER SURGICAL HISTORY  03/13/2018    Excision Of Pituitary Gland Partial    OTHER SURGICAL HISTORY  11/27/2019    Sinus surgery    TONSILLECTOMY  03/13/2018    Tonsillectomy    TOTAL KNEE ARTHROPLASTY  03/13/2018    Knee Replacement       No family history on file.    Social History     Tobacco Use    Smoking status: Never    Smokeless tobacco:  "Never   Substance Use Topics    Alcohol use: Yes     Comment: RARELY    Drug use: Never         Current Outpatient Medications:     albuterol 90 mcg/actuation inhaler, Inhale 2 puffs., Disp: , Rfl:     amLODIPine (Norvasc) 2.5 mg tablet, Take 1 tablet (2.5 mg) by mouth once daily., Disp: , Rfl:     Arexvy, PF, 120 mcg/0.5 mL suspension for reconstitution, , Disp: , Rfl:     budesonide-formoteroL (Symbicort) 160-4.5 mcg/actuation inhaler, Inhale 2 puffs twice a day., Disp: , Rfl:     desmopressin (DDAVP) 0.1 mg tablet, Take 2 tablets (0.2 mg) by mouth 2 times a day., Disp: 360 tablet, Rfl: 3    fluticasone (Flonase Allergy Relief) 50 mcg/actuation nasal spray, Administer 2 sprays into each nostril once daily., Disp: , Rfl:     folic acid (Folvite) 1 mg tablet, Take 1 tablet (1 mg) by mouth once daily., Disp: , Rfl:     hydrocortisone (Cortef) 10 mg tablet, Take 1 tablet (10 mg) by mouth once daily in the morning. Take before meals., Disp: 90 tablet, Rfl: 3    hydrocortisone (Cortef) 5 mg tablet, TAKE 1 & 1/2 TABLETS AROUND 1PM AND 1/2 TABLET IN THE AFTERNOON, Disp: 360 tablet, Rfl: 3    hydrocortisone (Cortef) 5 mg tablet, TAKE 1 & 1/2 TABLETS AROUND 1PM AND 1/2 TABLET IN THE AFTERNOON, Disp: 360 tablet, Rfl: 3    hydroxychloroquine (Plaquenil) 200 mg tablet, Take 2 tablets (400 mg) by mouth once daily., Disp: , Rfl:     insulin syringe-needle U-100 1 mL 28 gauge x 1/2\" syringe, AS DIRECTED 30 DAYS, Disp: , Rfl:     levothyroxine (Synthroid, Levoxyl) 150 mcg tablet, Take 1 tablet (150 mcg) by mouth once daily in the morning. Take before meals., Disp: 90 tablet, Rfl: 3    methotrexate 25 mg/mL injection, Inject 50 mg/m2 into the muscle 1 (one) time per week., Disp: , Rfl:     methotrexate 25 mg/mL, 0.7 ML INJECTION ONCE A WEEK 30 DAYS, Disp: , Rfl:     montelukast (Singulair) 10 mg tablet, Take 1 tablet (10 mg) by mouth once daily at bedtime., Disp: , Rfl:     multivitamin with minerals iron-free (Multivitamin 50 " "Plus), Take 1 tablet by mouth once daily., Disp: , Rfl:     predniSONE (Deltasone) 10 mg tablet, TAKE 3 TABS DAILY FOR 10 DAYS, 2 TABS DAILY FOR 10 DAYS, 1 TAB DAILY FOR 10 DAYS, Disp: , Rfl:     primidone (Mysoline) 50 mg tablet, 25 mg at bedtime for a week, then 50 mg at bedtime for a week, then 75mg at bedtime for one week, then finally 100mg at bedtime., Disp: 60 tablet, Rfl: 5    rosuvastatin (Crestor) 10 mg tablet, Take 1 tablet (10 mg) by mouth early in the morning.., Disp: , Rfl:     syringe with needle 3 mL 22 x 1 1/2\" syringe, Use as directed every 2 weeks., Disp: 20 each, Rfl: 3    testosterone cypionate (Depo-Testosterone) 200 mg/mL injection, Inject 0.5 mL (100 mg) into the muscle every 14 (fourteen) days., Disp: 2 mL, Rfl: 3    traMADol (Ultram) 50 mg tablet, Take 1 tablet (50 mg) by mouth every 8 hours if needed., Disp: , Rfl:     sulfaSALAzine (Azulfidine) 500 mg tablet, TAKE 1 TAB BY MOUTH 2X/DAY X1 WEEK THEN IF NO SIDE EFFECTS INCREASE TO 2 TABS 2X/DAY (Patient not taking: Reported on 10/22/2024), Disp: , Rfl:         Objective   Vitals:    10/22/24 0827   BP: (!) 156/93   Pulse: 64   Temp: 36.4 °C (97.5 °F)       no acute distress, well developed man appearing his  stated age  normal sclera  moist mucus membranes  no peripheral edema   symmetric chest rise  nondistended abdomen  alert and oriented, pupils equal and round, extraocular movements intact, full strength in all extremities, normal sensation to light touch throughout, reduced but symmetric reflexes, intention tremor bilaterally, but no dysmetria  normal mood      Assessment/Plan   I personally reviewed MRI of the sella with and without contrast done on 7/22/2024 and compared to prior MRI done on 6/8/2023.  This shows stable 2 areas of residual pituitary tumor in the sella extending into the suprasellar space and also in the left cavernous sinus.  There is at least mild compression of the right optic nerve and optic chiasm and thinning of " the optic apparatus.  Given that he has not had any significant change in his vision and overall stable appearance of residual tumor, I recommended continued observation.  I ordered follow-up MRI sella with and without contrast in 1 year.  He needs to continue follow-up with Dr. Tray Borjas in neuro-ophthalmology and Dr. Faustin in endocrinology.      New Ceron MD

## 2024-10-22 ENCOUNTER — LAB (OUTPATIENT)
Dept: LAB | Facility: LAB | Age: 74
End: 2024-10-22
Payer: MEDICARE

## 2024-10-22 ENCOUNTER — OFFICE VISIT (OUTPATIENT)
Dept: NEUROSURGERY | Facility: CLINIC | Age: 74
End: 2024-10-22
Payer: MEDICARE

## 2024-10-22 ENCOUNTER — APPOINTMENT (OUTPATIENT)
Dept: NEUROLOGY | Facility: CLINIC | Age: 74
End: 2024-10-22
Payer: MEDICARE

## 2024-10-22 VITALS
SYSTOLIC BLOOD PRESSURE: 156 MMHG | DIASTOLIC BLOOD PRESSURE: 93 MMHG | HEART RATE: 64 BPM | HEIGHT: 67 IN | TEMPERATURE: 97.5 F | BODY MASS INDEX: 25.9 KG/M2 | WEIGHT: 165 LBS

## 2024-10-22 VITALS
SYSTOLIC BLOOD PRESSURE: 145 MMHG | WEIGHT: 169 LBS | BODY MASS INDEX: 26.47 KG/M2 | DIASTOLIC BLOOD PRESSURE: 94 MMHG | HEART RATE: 60 BPM | RESPIRATION RATE: 16 BRPM

## 2024-10-22 DIAGNOSIS — G25.0 ESSENTIAL TREMOR: ICD-10-CM

## 2024-10-22 DIAGNOSIS — G62.9 NEUROPATHY: ICD-10-CM

## 2024-10-22 DIAGNOSIS — I95.1 ORTHOSTATIC HYPOTENSION: ICD-10-CM

## 2024-10-22 DIAGNOSIS — R26.9 GAIT DISORDER: Primary | ICD-10-CM

## 2024-10-22 DIAGNOSIS — D49.7 PITUITARY TUMOR: ICD-10-CM

## 2024-10-22 DIAGNOSIS — E23.0 HYPOPITUITARISM (MULTI): ICD-10-CM

## 2024-10-22 LAB
PROT SERPL-MCNC: 6.4 G/DL (ref 6.4–8.2)
VIT B12 SERPL-MCNC: 520 PG/ML (ref 211–911)

## 2024-10-22 PROCEDURE — 1159F MED LIST DOCD IN RCRD: CPT | Performed by: NURSE PRACTITIONER

## 2024-10-22 PROCEDURE — 1126F AMNT PAIN NOTED NONE PRSNT: CPT | Performed by: NEUROLOGICAL SURGERY

## 2024-10-22 PROCEDURE — 86334 IMMUNOFIX E-PHORESIS SERUM: CPT

## 2024-10-22 PROCEDURE — 84155 ASSAY OF PROTEIN SERUM: CPT

## 2024-10-22 PROCEDURE — 82607 VITAMIN B-12: CPT

## 2024-10-22 PROCEDURE — 84165 PROTEIN E-PHORESIS SERUM: CPT | Performed by: NURSE PRACTITIONER

## 2024-10-22 PROCEDURE — 99205 OFFICE O/P NEW HI 60 MIN: CPT | Performed by: NEUROLOGICAL SURGERY

## 2024-10-22 PROCEDURE — 36415 COLL VENOUS BLD VENIPUNCTURE: CPT

## 2024-10-22 PROCEDURE — 3008F BODY MASS INDEX DOCD: CPT | Performed by: NEUROLOGICAL SURGERY

## 2024-10-22 PROCEDURE — 83921 ORGANIC ACID SINGLE QUANT: CPT

## 2024-10-22 PROCEDURE — 1159F MED LIST DOCD IN RCRD: CPT | Performed by: NEUROLOGICAL SURGERY

## 2024-10-22 PROCEDURE — 1160F RVW MEDS BY RX/DR IN RCRD: CPT | Performed by: NURSE PRACTITIONER

## 2024-10-22 PROCEDURE — 1036F TOBACCO NON-USER: CPT | Performed by: NURSE PRACTITIONER

## 2024-10-22 PROCEDURE — 99215 OFFICE O/P EST HI 40 MIN: CPT | Performed by: NEUROLOGICAL SURGERY

## 2024-10-22 PROCEDURE — 3080F DIAST BP >= 90 MM HG: CPT | Performed by: NEUROLOGICAL SURGERY

## 2024-10-22 PROCEDURE — 3077F SYST BP >= 140 MM HG: CPT | Performed by: NURSE PRACTITIONER

## 2024-10-22 PROCEDURE — 84165 PROTEIN E-PHORESIS SERUM: CPT

## 2024-10-22 PROCEDURE — 3080F DIAST BP >= 90 MM HG: CPT | Performed by: NURSE PRACTITIONER

## 2024-10-22 PROCEDURE — 86320 SERUM IMMUNOELECTROPHORESIS: CPT | Performed by: NURSE PRACTITIONER

## 2024-10-22 PROCEDURE — 99215 OFFICE O/P EST HI 40 MIN: CPT | Performed by: NURSE PRACTITIONER

## 2024-10-22 PROCEDURE — 3077F SYST BP >= 140 MM HG: CPT | Performed by: NEUROLOGICAL SURGERY

## 2024-10-22 ASSESSMENT — ENCOUNTER SYMPTOMS
LOSS OF SENSATION IN FEET: 0
DEPRESSION: 0
OCCASIONAL FEELINGS OF UNSTEADINESS: 1

## 2024-10-22 ASSESSMENT — PAIN SCALES - GENERAL: PAINLEVEL_OUTOF10: 0-NO PAIN

## 2024-10-22 NOTE — PATIENT INSTRUCTIONS
#Decrease primidone to 50mg nightly x 1 week, if balance does not improve then stop completely and stay off for 10 days to assess    Send me a MyChart to update me so we can see if primidone contributes    #For neuropathy (numbness/tingling in toes)    -labs today  -EMG    -Please check A1C with PCP    #MRI brain as ordered by neurosurgeon    #Blood Pressure Monitoring: for dizziness and or low blood pressure  Sitting and standing blood pressure: Please take sitting blood pressure and heart rate and after standing up for 3 minutes while you are still standing take blood pressure and heart rate again  Do this twice a day (mid morning and mid afternoon) for 1 week   then send me the readings via [employer] or fax ([phone])      If significant we can let cardio know    #Follow up as scheduled with Dr. Anshu Pike, NP-C  Adult/Gerontological Nurse Practitioner   [facility], Department of Neurology  [facility] [place]  [facility]  [address] [place] [address]  Phone: [phone]  Fax: [phone]

## 2024-10-22 NOTE — PROGRESS NOTES
"Subjective     Martinez Potter is a 74 y.o. year old male who presents with Gait Problem and Tremors, here for follow up visit.    HPI    Last visit 6/17/24 with Dr. Brasher---mild essential tremor vs enhanced physiological tremor vs combination.   PLAN  - Start primidone 25mg at bedtime to be uptitrated by 25mg weekly to final dose 100mg at bedtime, advised of side effects including sedation and feeling off balance.   - Provided supplemental resources for further information  - RTC in 6-9 months or sooner as needed    He comes with wife for sooner apt for balance worsening.     Tremors helped by primidone greatly, has really helped, can eat, can go to restaurants.   Though balance worse since last visit.  Endocrine wanted him seen for balance issues.    Used to have issues bending over ( at an outlet), if he stood quickly he would be wobbly. But he sits and still feels wobbly.   Fell getting up quickly to answer the door. This was about 2 weeks ago, looses balance often, falling every few weeks.     He is just off balance, denies feeling dizziness/lightheadedness or syncope.  He feels like he is rocking on a boat often--started a year ago, gets   Has never been treated for vertigo, no attacks of vertigo.    After gall bladder surgery 1.5yrs ago, has been unsteady since then and worse since last visit in July.   Some days he has to sit as he is not safe to be up and move around, more frequent.     Tilt table testing was positive for OH. Sees cardiology as her also has HTN. BP goes up easily.    Seen by NS for pituitary tumor. Has had surgery 2x in the 1980s for this, monitoring since cannot get to it all (reports tumor on the optic nerve). Pending repeat MRI brain    2021 Dr. Sahni noted \"Impression: He is symptomatic with low back pain and some degree of radicular symptoms. He appears to have peripheral neuropathy as well.\"  Patient was unaware, notes he has not had EMG/neuropathy w/u.  Toes get numb and " "tingling R>L. Not painful, all the time like they are asleep.     He cannot close his eyes in the shower, has a shower chair to sit. Cannot do a tandem walk.     Denies low back pain since surgery.    Previously on gabapentin, unsure why but did not have tremors at the time.       Patient Health Questionnaire-2 Score: 0            Current Outpatient Medications:     albuterol 90 mcg/actuation inhaler, Inhale 2 puffs., Disp: , Rfl:     amLODIPine (Norvasc) 2.5 mg tablet, Take 1 tablet (2.5 mg) by mouth once daily., Disp: , Rfl:     Arexvy, PF, 120 mcg/0.5 mL suspension for reconstitution, , Disp: , Rfl:     budesonide-formoteroL (Symbicort) 160-4.5 mcg/actuation inhaler, Inhale 2 puffs twice a day., Disp: , Rfl:     desmopressin (DDAVP) 0.1 mg tablet, Take 2 tablets (0.2 mg) by mouth 2 times a day., Disp: 360 tablet, Rfl: 3    fluticasone (Flonase Allergy Relief) 50 mcg/actuation nasal spray, Administer 2 sprays into each nostril once daily., Disp: , Rfl:     folic acid (Folvite) 1 mg tablet, Take 1 tablet (1 mg) by mouth once daily., Disp: , Rfl:     hydrocortisone (Cortef) 10 mg tablet, Take 1 tablet (10 mg) by mouth once daily in the morning. Take before meals., Disp: 90 tablet, Rfl: 3    hydrocortisone (Cortef) 5 mg tablet, TAKE 1 & 1/2 TABLETS AROUND 1PM AND 1/2 TABLET IN THE AFTERNOON, Disp: 360 tablet, Rfl: 3    hydrocortisone (Cortef) 5 mg tablet, TAKE 1 & 1/2 TABLETS AROUND 1PM AND 1/2 TABLET IN THE AFTERNOON, Disp: 360 tablet, Rfl: 3    hydroxychloroquine (Plaquenil) 200 mg tablet, Take 2 tablets (400 mg) by mouth once daily., Disp: , Rfl:     insulin syringe-needle U-100 1 mL 28 gauge x 1/2\" syringe, AS DIRECTED 30 DAYS, Disp: , Rfl:     levothyroxine (Synthroid, Levoxyl) 150 mcg tablet, Take 1 tablet (150 mcg) by mouth once daily in the morning. Take before meals., Disp: 90 tablet, Rfl: 3    methotrexate 25 mg/mL injection, Inject 50 mg/m2 into the muscle 1 (one) time per week., Disp: , Rfl:     " "methotrexate 25 mg/mL, 0.7 ML INJECTION ONCE A WEEK 30 DAYS, Disp: , Rfl:     montelukast (Singulair) 10 mg tablet, Take 1 tablet (10 mg) by mouth once daily at bedtime., Disp: , Rfl:     multivitamin with minerals iron-free (Multivitamin 50 Plus), Take 1 tablet by mouth once daily., Disp: , Rfl:     predniSONE (Deltasone) 10 mg tablet, TAKE 3 TABS DAILY FOR 10 DAYS, 2 TABS DAILY FOR 10 DAYS, 1 TAB DAILY FOR 10 DAYS, Disp: , Rfl:     primidone (Mysoline) 50 mg tablet, 25 mg at bedtime for a week, then 50 mg at bedtime for a week, then 75mg at bedtime for one week, then finally 100mg at bedtime., Disp: 60 tablet, Rfl: 5    rosuvastatin (Crestor) 10 mg tablet, Take 1 tablet (10 mg) by mouth early in the morning.., Disp: , Rfl:     sulfaSALAzine (Azulfidine) 500 mg tablet, TAKE 1 TAB BY MOUTH 2X/DAY X1 WEEK THEN IF NO SIDE EFFECTS INCREASE TO 2 TABS 2X/DAY (Patient not taking: Reported on 10/22/2024), Disp: , Rfl:     syringe with needle 3 mL 22 x 1 1/2\" syringe, Use as directed every 2 weeks., Disp: 20 each, Rfl: 3    testosterone cypionate (Depo-Testosterone) 200 mg/mL injection, Inject 0.5 mL (100 mg) into the muscle every 14 (fourteen) days., Disp: 2 mL, Rfl: 3    traMADol (Ultram) 50 mg tablet, Take 1 tablet (50 mg) by mouth every 8 hours if needed., Disp: , Rfl:        Objective   Vitals:    10/22/24 1054   BP: (!) 145/94   BP Location: Left arm   Patient Position: Sitting   BP Cuff Size: Small adult   Pulse: 60   Resp: 16   Weight: 76.7 kg (169 lb)                 Physical Exam  General:  Well developed well-nourished male in no acute distress with good grooming.  Mental Status:  The patient is awake, alert and oriented to time, place and person.  Grossly normal fund of knowledge noted.  Recent and remote memory intact.  Attention span and concentration is within normal limits.   Language:  Speech is clear, language is intact.   Cranial Nerves:  Pupils are equal, round, reactive to light . Extraocular muscles " are intact. Cranial nerve 5 and 7 are symmetric bilaterally.  Hearing is intact to voice. Palate elevates symmetrically.  Tongue is midline.  Shoulder shrug is intact.   Motor Exam:  Shows symmetric strength in the upper and lower extremities bilaterally both proximally and distally with normal tone and bulk. Muscle strength 5/5 throughout.   Sensory Exam: Absent sensation to vibration BLE down going, absent to temp, pin prick and light touch intact. Proprioception intact  Deep Tendon Reflexes: Absent biceps and radial reflex, 1+ triceps, absent ankle jerks, 1+ patellars  Cerebellar Exam:  Shows no dysmetria or truncal ataxia.  Gait and Station:  Absent arm swing, slightly wide base, unable to do tandem      Bradykinesia LUE 1+ and with toe taps LLE  No rigidity  No resting tremor  Kinetic tremor RUE 0 LUE 1+, postural tremor RUE 0 LUE 1+  Spirals and line LUE both 2+ and RUE both 1+ spirals and line 0  Handwriting with minimal tremor      Assessment/Plan   Mr. Martinez Potter is a 74 y.o. M w/ PMH pituitary adenoma s/p resection in 1980s, asthma, HTN, rheumatoid arthritis, seen in f/u for ET vs enhanced physiologic tremor. He was started on primidone last visit and reports marked improvement in tremor with primidone 100mg nightly. He presents today with concerns re: worsening balance since last visit. This did coincide with starting primidone so will try weaning. However, he describes a rocking on a boat sensation even if sitting but also balance worsens when getting up quickly. His main c/o is feeling off balance and denies pre-syncope or lightheaded or dizziness. He reports having to be more sedentary. He does have OH and this needs monitored. Also neuropathy on exam and subjective c/o numbness in toes.  PE shows decreased/absent reflexes, absent sensation in BLE to vibration/temp, unable to do tandem and parkinsonism (absent arm swing, LUE and LLE bradykinesia which is mild), tremors as above.     Neuropathy: on  Synthroid, labs being monitored, check SPEP, B12, MMA and EMG, A1C not covered for dx so should check with PCP.     PLAN  -wean primidone and reassess balance (if cause then consider propranolol w/ cardio OK)  -check OH  -neurpopathy w/u    Impression: gait disorder can be d/t primidone (or worsened by it), multifactorial (neuropathy, mild parkinsonism, ET, OH), possibly mal de debarquement (MDD) due to the sensation he describes      Diagnoses and all orders for this visit:  Gait disorder  Neuropathy  -     Methylmalonic Acid; Future  -     Serum Protein Electrophoresis + Immunofixation; Future  -     Vitamin B12; Future  -     EMG & nerve conduction; Future  Essential tremor  Orthostatic hypotension      #Decrease primidone to 50mg nightly x 1 week, if balance does not improve then stop completely and stay off for 10 days to assess    Send me a MyChart to update me so we can see if primidone contributes    #For neuropathy (numbness/tingling in toes)    -labs today  -EMG    -Please check A1C with PCP    #MRI brain as ordered by neurosurgeon    #Blood Pressure Monitoring: for dizziness and or low blood pressure  Sitting and standing blood pressure: Please take sitting blood pressure and heart rate and after standing up for 3 minutes while you are still standing take blood pressure and heart rate again  Do this twice a day (mid morning and mid afternoon) for 1 week   then send me the readings via [employer] or fax ([phone])      If significant we can let cardio know    #Follow up as scheduled with Dr. Anshu RODRIGUEZ, TALIB Pike, personally performed  a medically appropriate exam, discussion of care/treatment options taking a total time of 41minutes for today's visit.      Orion Pike, TALIB-C  Adult/Gerontological Nurse Practitioner   [facility], Department of Neurology  [facility] [place]  [facility]  [address] [place] [address]  Phone:  [phone]  Fax: [phone]

## 2024-10-25 LAB
ALBUMIN: 4.2 G/DL (ref 3.4–5)
ALPHA 1 GLOBULIN: 0.3 G/DL (ref 0.2–0.6)
ALPHA 2 GLOBULIN: 0.6 G/DL (ref 0.4–1.1)
BETA GLOBULIN: 0.7 G/DL (ref 0.5–1.2)
GAMMA GLOBULIN: 0.6 G/DL (ref 0.5–1.4)
IMMUNOFIXATION COMMENT: ABNORMAL
M-PROTEIN 1: 0.1 G/DL
METHYLMALONATE SERPL-SCNC: 0.18 UMOL/L (ref 0–0.4)
PATH REVIEW - SERUM IMMUNOFIXATION: ABNORMAL
PATH REVIEW-SERUM PROTEIN ELECTROPHORESIS: ABNORMAL
PROTEIN ELECTROPHORESIS COMMENT: ABNORMAL

## 2024-10-28 DIAGNOSIS — D47.2 MONOCLONAL GAMMOPATHY PRESENT ON SERUM PROTEIN ELECTROPHORESIS: Primary | ICD-10-CM

## 2024-10-28 DIAGNOSIS — D47.2 MGUS (MONOCLONAL GAMMOPATHY OF UNKNOWN SIGNIFICANCE): Primary | ICD-10-CM

## 2024-10-29 ENCOUNTER — LAB (OUTPATIENT)
Dept: LAB | Facility: LAB | Age: 74
End: 2024-10-29
Payer: MEDICARE

## 2024-10-29 DIAGNOSIS — D47.2 MGUS (MONOCLONAL GAMMOPATHY OF UNKNOWN SIGNIFICANCE): ICD-10-CM

## 2024-10-29 LAB
ALBUMIN SERPL BCP-MCNC: 3.9 G/DL (ref 3.4–5)
ALP SERPL-CCNC: 60 U/L (ref 33–136)
ALT SERPL W P-5'-P-CCNC: 33 U/L (ref 10–52)
ANION GAP SERPL CALC-SCNC: 10 MMOL/L (ref 10–20)
AST SERPL W P-5'-P-CCNC: 31 U/L (ref 9–39)
BASOPHILS # BLD AUTO: 0.06 X10*3/UL (ref 0–0.1)
BASOPHILS NFR BLD AUTO: 0.7 %
BILIRUB SERPL-MCNC: 0.6 MG/DL (ref 0–1.2)
BUN SERPL-MCNC: 12 MG/DL (ref 6–23)
CALCIUM SERPL-MCNC: 8.3 MG/DL (ref 8.6–10.3)
CHLORIDE SERPL-SCNC: 90 MMOL/L (ref 98–107)
CO2 SERPL-SCNC: 28 MMOL/L (ref 21–32)
CREAT SERPL-MCNC: 0.73 MG/DL (ref 0.5–1.3)
EGFRCR SERPLBLD CKD-EPI 2021: >90 ML/MIN/1.73M*2
EOSINOPHIL # BLD AUTO: 0.03 X10*3/UL (ref 0–0.4)
EOSINOPHIL NFR BLD AUTO: 0.3 %
ERYTHROCYTE [DISTWIDTH] IN BLOOD BY AUTOMATED COUNT: 13.2 % (ref 11.5–14.5)
GLUCOSE SERPL-MCNC: 62 MG/DL (ref 74–99)
HCT VFR BLD AUTO: 43.6 % (ref 41–52)
HGB BLD-MCNC: 14.4 G/DL (ref 13.5–17.5)
IMM GRANULOCYTES # BLD AUTO: 0.11 X10*3/UL (ref 0–0.5)
IMM GRANULOCYTES NFR BLD AUTO: 1.2 % (ref 0–0.9)
LYMPHOCYTES # BLD AUTO: 1.19 X10*3/UL (ref 0.8–3)
LYMPHOCYTES NFR BLD AUTO: 13.3 %
MCH RBC QN AUTO: 32.7 PG (ref 26–34)
MCHC RBC AUTO-ENTMCNC: 33 G/DL (ref 32–36)
MCV RBC AUTO: 99 FL (ref 80–100)
MONOCYTES # BLD AUTO: 1.23 X10*3/UL (ref 0.05–0.8)
MONOCYTES NFR BLD AUTO: 13.8 %
NEUTROPHILS # BLD AUTO: 6.31 X10*3/UL (ref 1.6–5.5)
NEUTROPHILS NFR BLD AUTO: 70.7 %
NRBC BLD-RTO: 0 /100 WBCS (ref 0–0)
PLATELET # BLD AUTO: 310 X10*3/UL (ref 150–450)
POTASSIUM SERPL-SCNC: 4.7 MMOL/L (ref 3.5–5.3)
PROT SERPL-MCNC: 5.5 G/DL (ref 6.4–8.2)
PROT UR-ACNC: 12 MG/DL (ref 5–25)
RBC # BLD AUTO: 4.41 X10*6/UL (ref 4.5–5.9)
SODIUM SERPL-SCNC: 123 MMOL/L (ref 136–145)
WBC # BLD AUTO: 8.9 X10*3/UL (ref 4.4–11.3)

## 2024-10-29 PROCEDURE — 85025 COMPLETE CBC W/AUTO DIFF WBC: CPT

## 2024-10-29 PROCEDURE — 36415 COLL VENOUS BLD VENIPUNCTURE: CPT

## 2024-10-29 PROCEDURE — 83521 IG LIGHT CHAINS FREE EACH: CPT

## 2024-10-29 PROCEDURE — 84156 ASSAY OF PROTEIN URINE: CPT

## 2024-10-29 PROCEDURE — 84166 PROTEIN E-PHORESIS/URINE/CSF: CPT

## 2024-10-29 PROCEDURE — 80053 COMPREHEN METABOLIC PANEL: CPT

## 2024-10-29 PROCEDURE — 84166 PROTEIN E-PHORESIS/URINE/CSF: CPT | Performed by: INTERNAL MEDICINE

## 2024-10-30 LAB
KAPPA LC SERPL-MCNC: 1.01 MG/DL (ref 0.33–1.94)
KAPPA LC/LAMBDA SER: 1.38 {RATIO} (ref 0.26–1.65)
LAMBDA LC SERPL-MCNC: 0.73 MG/DL (ref 0.57–2.63)

## 2024-10-31 LAB
ALBUMIN MFR UR ELPH: 30.8 %
ALPHA1 GLOB MFR UR ELPH: 11.5 %
ALPHA2 GLOB MFR UR ELPH: 22.9 %
B-GLOBULIN MFR UR ELPH: 20.9 %
GAMMA GLOB MFR UR ELPH: 13.9 %
PATH REVIEW-URINE PROTEIN ELECTROPHORESIS: NORMAL
URINE ELECTROPHORESIS COMMENT: NORMAL

## 2024-11-05 ENCOUNTER — APPOINTMENT (OUTPATIENT)
Dept: RHEUMATOLOGY | Facility: CLINIC | Age: 74
End: 2024-11-05
Payer: MEDICARE

## 2024-11-05 ENCOUNTER — LAB (OUTPATIENT)
Dept: LAB | Facility: LAB | Age: 74
End: 2024-11-05
Payer: MEDICARE

## 2024-11-05 ENCOUNTER — HOSPITAL ENCOUNTER (OUTPATIENT)
Dept: RADIOLOGY | Facility: CLINIC | Age: 74
Discharge: HOME | End: 2024-11-05
Payer: MEDICARE

## 2024-11-05 VITALS
BODY MASS INDEX: 27.31 KG/M2 | HEART RATE: 60 BPM | WEIGHT: 174 LBS | TEMPERATURE: 97.9 F | DIASTOLIC BLOOD PRESSURE: 106 MMHG | HEIGHT: 67 IN | SYSTOLIC BLOOD PRESSURE: 184 MMHG

## 2024-11-05 DIAGNOSIS — M19.90 INFLAMMATORY ARTHRITIS: ICD-10-CM

## 2024-11-05 DIAGNOSIS — Z79.899 HIGH RISK MEDICATION USE: ICD-10-CM

## 2024-11-05 DIAGNOSIS — E55.9 VITAMIN D DEFICIENCY: ICD-10-CM

## 2024-11-05 DIAGNOSIS — M06.00 RHEUMATOID ARTHRITIS WITH NEGATIVE RHEUMATOID FACTOR, INVOLVING UNSPECIFIED SITE (MULTI): ICD-10-CM

## 2024-11-05 DIAGNOSIS — M19.90 INFLAMMATORY ARTHRITIS: Primary | ICD-10-CM

## 2024-11-05 LAB
25(OH)D3 SERPL-MCNC: 41 NG/ML (ref 30–100)
ALBUMIN SERPL BCP-MCNC: 4.1 G/DL (ref 3.4–5)
ALP SERPL-CCNC: 73 U/L (ref 33–136)
ALT SERPL W P-5'-P-CCNC: 38 U/L (ref 10–52)
ANION GAP SERPL CALC-SCNC: 11 MMOL/L (ref 10–20)
AST SERPL W P-5'-P-CCNC: 26 U/L (ref 9–39)
BASOPHILS # BLD AUTO: 0.05 X10*3/UL (ref 0–0.1)
BASOPHILS NFR BLD AUTO: 0.7 %
BILIRUB SERPL-MCNC: 0.6 MG/DL (ref 0–1.2)
BUN SERPL-MCNC: 10 MG/DL (ref 6–23)
CALCIUM SERPL-MCNC: 9 MG/DL (ref 8.6–10.6)
CCP IGG SERPL-ACNC: <1 U/ML
CHLORIDE SERPL-SCNC: 95 MMOL/L (ref 98–107)
CO2 SERPL-SCNC: 29 MMOL/L (ref 21–32)
CREAT SERPL-MCNC: 0.72 MG/DL (ref 0.5–1.3)
CRP SERPL-MCNC: 0.39 MG/DL
EGFRCR SERPLBLD CKD-EPI 2021: >90 ML/MIN/1.73M*2
EOSINOPHIL # BLD AUTO: 0.03 X10*3/UL (ref 0–0.4)
EOSINOPHIL NFR BLD AUTO: 0.4 %
ERYTHROCYTE [DISTWIDTH] IN BLOOD BY AUTOMATED COUNT: 13.2 % (ref 11.5–14.5)
ERYTHROCYTE [SEDIMENTATION RATE] IN BLOOD BY WESTERGREN METHOD: 2 MM/H (ref 0–20)
GLUCOSE SERPL-MCNC: 90 MG/DL (ref 74–99)
HBV CORE AB SER QL: NONREACTIVE
HBV SURFACE AB SER-ACNC: <3.1 MIU/ML
HBV SURFACE AG SERPL QL IA: NONREACTIVE
HCT VFR BLD AUTO: 44.5 % (ref 41–52)
HCV AB SER QL: NONREACTIVE
HGB BLD-MCNC: 15.2 G/DL (ref 13.5–17.5)
IMM GRANULOCYTES # BLD AUTO: 0.06 X10*3/UL (ref 0–0.5)
IMM GRANULOCYTES NFR BLD AUTO: 0.9 % (ref 0–0.9)
LYMPHOCYTES # BLD AUTO: 1.23 X10*3/UL (ref 0.8–3)
LYMPHOCYTES NFR BLD AUTO: 18.1 %
MCH RBC QN AUTO: 33.6 PG (ref 26–34)
MCHC RBC AUTO-ENTMCNC: 34.2 G/DL (ref 32–36)
MCV RBC AUTO: 98 FL (ref 80–100)
MONOCYTES # BLD AUTO: 0.8 X10*3/UL (ref 0.05–0.8)
MONOCYTES NFR BLD AUTO: 11.8 %
NEUTROPHILS # BLD AUTO: 4.61 X10*3/UL (ref 1.6–5.5)
NEUTROPHILS NFR BLD AUTO: 68.1 %
NRBC BLD-RTO: 0 /100 WBCS (ref 0–0)
PLATELET # BLD AUTO: 303 X10*3/UL (ref 150–450)
POTASSIUM SERPL-SCNC: 4.6 MMOL/L (ref 3.5–5.3)
PROT SERPL-MCNC: 6 G/DL (ref 6.4–8.2)
RBC # BLD AUTO: 4.53 X10*6/UL (ref 4.5–5.9)
RHEUMATOID FACT SER NEPH-ACNC: <10 IU/ML (ref 0–15)
SODIUM SERPL-SCNC: 130 MMOL/L (ref 136–145)
WBC # BLD AUTO: 6.8 X10*3/UL (ref 4.4–11.3)

## 2024-11-05 PROCEDURE — 36415 COLL VENOUS BLD VENIPUNCTURE: CPT

## 2024-11-05 PROCEDURE — 86140 C-REACTIVE PROTEIN: CPT

## 2024-11-05 PROCEDURE — 1036F TOBACCO NON-USER: CPT | Performed by: STUDENT IN AN ORGANIZED HEALTH CARE EDUCATION/TRAINING PROGRAM

## 2024-11-05 PROCEDURE — 86431 RHEUMATOID FACTOR QUANT: CPT

## 2024-11-05 PROCEDURE — 85025 COMPLETE CBC W/AUTO DIFF WBC: CPT

## 2024-11-05 PROCEDURE — 82306 VITAMIN D 25 HYDROXY: CPT

## 2024-11-05 PROCEDURE — 86706 HEP B SURFACE ANTIBODY: CPT

## 2024-11-05 PROCEDURE — 3008F BODY MASS INDEX DOCD: CPT | Performed by: STUDENT IN AN ORGANIZED HEALTH CARE EDUCATION/TRAINING PROGRAM

## 2024-11-05 PROCEDURE — 86704 HEP B CORE ANTIBODY TOTAL: CPT

## 2024-11-05 PROCEDURE — 87340 HEPATITIS B SURFACE AG IA: CPT

## 2024-11-05 PROCEDURE — 73600 X-RAY EXAM OF ANKLE: CPT | Mod: 50

## 2024-11-05 PROCEDURE — 73120 X-RAY EXAM OF HAND: CPT | Mod: 50

## 2024-11-05 PROCEDURE — 86200 CCP ANTIBODY: CPT

## 2024-11-05 PROCEDURE — 86481 TB AG RESPONSE T-CELL SUSP: CPT

## 2024-11-05 PROCEDURE — 86803 HEPATITIS C AB TEST: CPT

## 2024-11-05 PROCEDURE — 73620 X-RAY EXAM OF FOOT: CPT | Mod: BILATERAL PROCEDURE | Performed by: RADIOLOGY

## 2024-11-05 PROCEDURE — 73100 X-RAY EXAM OF WRIST: CPT | Mod: 50

## 2024-11-05 PROCEDURE — 99205 OFFICE O/P NEW HI 60 MIN: CPT | Performed by: STUDENT IN AN ORGANIZED HEALTH CARE EDUCATION/TRAINING PROGRAM

## 2024-11-05 PROCEDURE — 73620 X-RAY EXAM OF FOOT: CPT | Mod: 50

## 2024-11-05 PROCEDURE — 73120 X-RAY EXAM OF HAND: CPT | Mod: BILATERAL PROCEDURE | Performed by: RADIOLOGY

## 2024-11-05 PROCEDURE — 3077F SYST BP >= 140 MM HG: CPT | Performed by: STUDENT IN AN ORGANIZED HEALTH CARE EDUCATION/TRAINING PROGRAM

## 2024-11-05 PROCEDURE — 85652 RBC SED RATE AUTOMATED: CPT

## 2024-11-05 PROCEDURE — G2211 COMPLEX E/M VISIT ADD ON: HCPCS | Performed by: STUDENT IN AN ORGANIZED HEALTH CARE EDUCATION/TRAINING PROGRAM

## 2024-11-05 PROCEDURE — 1160F RVW MEDS BY RX/DR IN RCRD: CPT | Performed by: STUDENT IN AN ORGANIZED HEALTH CARE EDUCATION/TRAINING PROGRAM

## 2024-11-05 PROCEDURE — 1159F MED LIST DOCD IN RCRD: CPT | Performed by: STUDENT IN AN ORGANIZED HEALTH CARE EDUCATION/TRAINING PROGRAM

## 2024-11-05 PROCEDURE — 3080F DIAST BP >= 90 MM HG: CPT | Performed by: STUDENT IN AN ORGANIZED HEALTH CARE EDUCATION/TRAINING PROGRAM

## 2024-11-05 PROCEDURE — 80053 COMPREHEN METABOLIC PANEL: CPT

## 2024-11-05 PROCEDURE — 86038 ANTINUCLEAR ANTIBODIES: CPT

## 2024-11-05 RX ORDER — HYDROXYCHLOROQUINE SULFATE 200 MG/1
400 TABLET, FILM COATED ORAL DAILY
Qty: 60 TABLET | Refills: 2 | Status: SHIPPED | OUTPATIENT
Start: 2024-11-05

## 2024-11-05 RX ORDER — FOLIC ACID 1 MG/1
1 TABLET ORAL DAILY
Qty: 30 TABLET | Refills: 2 | Status: SHIPPED | OUTPATIENT
Start: 2024-11-05

## 2024-11-05 NOTE — PROGRESS NOTES
"Rheumatology New Outpatient  Note    Subjective   Martinez Potter \"Steven\" is a 74 y.o. male presenting today for Joint Pain.    History of Presenting Problem:   Martinez with PMHx of HTN, SNHL, GERD, vitamin D deficiency, Lumbar spondylosis, hypopituitarism, possible multiple myeloma, Asthma, and seronegative RA, is presenting today as a NP for RA    Rheum history: diagnosed with RA ~ early 2024. It started with elbow bubbles (?rheumatoid nodules), he also had wrist pain (more around 1st [facility]). He had right knee replacement in 2005. He also had chronic low back pain.   No significant pain in MCPs or PIPs. He has right big toe pain and stiffness.   His serologies were negative.  He was diagnosed with seronegative RA    He was started on HCQ/MTX and it helped reduce the ?elbow bubbles. Didn't help much with wrist pain.     He is on hydrocortisone for hypopituitarism. 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    Current IS:  MTX 25 mg subcutaneous (didn't tolerate oral MTX for GI discomfort)  HCQ  400 mg daily    Past IS:  SSZ /(not helpful)    Past Medical History:   Past Medical History:   Diagnosis Date    Acute bronchospasm 09/28/2018    Cough due to bronchospasm    Candidal stomatitis 05/28/2018    Thrush    Contusion of right front wall of thorax, initial encounter 03/25/2021    Rib contusion, right, initial encounter    Cramp and spasm 06/18/2021    Muscle cramps    Encounter for other general examination     Rectal exam    Other injury of unspecified body region, initial encounter 08/07/2015    Musculoskeletal strain    Other specified disorders of left ear 01/03/2020    Sensation of fullness in left ear    Other specified disorders of temporomandibular joint 01/29/2020    TMJ inflammation    Other specified symptoms and signs involving the circulatory and respiratory systems 09/28/2018    Chest sounds abnormal on percussion or auscultation    Otitis media, unspecified, left ear 10/04/2019    Acute " left otitis media    Personal history of other diseases of male genital organs     History of erectile dysfunction    Personal history of other diseases of the nervous system and sense organs 02/10/2017    History of tinnitus    Personal history of other diseases of the respiratory system 09/28/2018    History of acute sinusitis    Personal history of other diseases of the respiratory system 10/04/2019    History of acute sinusitis    Personal history of other diseases of the respiratory system 12/19/2019    History of paranasal sinus congestion    Personal history of other specified conditions     History of pituitary neoplasm    Personal history of other specified conditions 02/16/2021    History of epistaxis    Personal history of other specified conditions 11/28/2019    History of facial pain    Personal history of other specified conditions 11/28/2019    History of postnasal drip    Personal history of other specified conditions 12/19/2019    History of persistent cough    Personal history of other specified conditions 09/28/2018    History of persistent cough    Personal history of other specified conditions 09/28/2018    History of hoarseness    Personal history of other specified conditions 09/28/2018    History of vertigo    Spinal stenosis, lumbosacral region 10/07/2021    Lumbosacral spinal stenosis    Tinnitus, left ear 01/29/2020    Tinnitus of left ear    Unspecified injury of thorax, initial encounter 03/25/2021    Rib injury       Allergies:   Allergies   Allergen Reactions    Pollen Extracts Other       Medications:   Current Outpatient Medications:     albuterol 90 mcg/actuation inhaler, Inhale 2 puffs., Disp: , Rfl:     amLODIPine (Norvasc) 2.5 mg tablet, Take 1 tablet (2.5 mg) by mouth once daily., Disp: , Rfl:     Arexvy, PF, 120 mcg/0.5 mL suspension for reconstitution, , Disp: , Rfl:     budesonide-formoteroL (Symbicort) 160-4.5 mcg/actuation inhaler, Inhale 2 puffs twice a day., Disp: , Rfl:  "    desmopressin (DDAVP) 0.1 mg tablet, Take 2 tablets (0.2 mg) by mouth 2 times a day., Disp: 360 tablet, Rfl: 3    fluticasone (Flonase Allergy Relief) 50 mcg/actuation nasal spray, Administer 2 sprays into each nostril once daily., Disp: , Rfl:     folic acid (Folvite) 1 mg tablet, Take 1 tablet (1 mg) by mouth once daily., Disp: , Rfl:     hydrocortisone (Cortef) 10 mg tablet, Take 1 tablet (10 mg) by mouth once daily in the morning. Take before meals., Disp: 90 tablet, Rfl: 3    hydrocortisone (Cortef) 5 mg tablet, TAKE 1 & 1/2 TABLETS AROUND 1PM AND 1/2 TABLET IN THE AFTERNOON, Disp: 360 tablet, Rfl: 3    hydrocortisone (Cortef) 5 mg tablet, TAKE 1 & 1/2 TABLETS AROUND 1PM AND 1/2 TABLET IN THE AFTERNOON, Disp: 360 tablet, Rfl: 3    hydroxychloroquine (Plaquenil) 200 mg tablet, Take 2 tablets (400 mg) by mouth once daily., Disp: , Rfl:     insulin syringe-needle U-100 1 mL 28 gauge x 1/2\" syringe, AS DIRECTED 30 DAYS, Disp: , Rfl:     levothyroxine (Synthroid, Levoxyl) 150 mcg tablet, Take 1 tablet (150 mcg) by mouth once daily in the morning. Take before meals., Disp: 90 tablet, Rfl: 3    methotrexate 25 mg/mL injection, Inject 50 mg/m2 into the muscle 1 (one) time per week., Disp: , Rfl:     methotrexate 25 mg/mL, 0.7 ML INJECTION ONCE A WEEK 30 DAYS, Disp: , Rfl:     montelukast (Singulair) 10 mg tablet, Take 1 tablet (10 mg) by mouth once daily at bedtime., Disp: , Rfl:     multivitamin with minerals iron-free (Multivitamin 50 Plus), Take 1 tablet by mouth once daily., Disp: , Rfl:     predniSONE (Deltasone) 10 mg tablet, TAKE 3 TABS DAILY FOR 10 DAYS, 2 TABS DAILY FOR 10 DAYS, 1 TAB DAILY FOR 10 DAYS, Disp: , Rfl:     primidone (Mysoline) 50 mg tablet, 25 mg at bedtime for a week, then 50 mg at bedtime for a week, then 75mg at bedtime for one week, then finally 100mg at bedtime., Disp: 60 tablet, Rfl: 5    rosuvastatin (Crestor) 10 mg tablet, Take 1 tablet (10 mg) by mouth early in the morning.., Disp: , " "Rfl:     sulfaSALAzine (Azulfidine) 500 mg tablet, TAKE 1 TAB BY MOUTH 2X/DAY X1 WEEK THEN IF NO SIDE EFFECTS INCREASE TO 2 TABS 2X/DAY (Patient not taking: Reported on 10/22/2024), Disp: , Rfl:     syringe with needle 3 mL 22 x 1 1/2\" syringe, Use as directed every 2 weeks., Disp: 20 each, Rfl: 3    testosterone cypionate (Depo-Testosterone) 200 mg/mL injection, Inject 0.5 mL (100 mg) into the muscle every 14 (fourteen) days., Disp: 2 mL, Rfl: 3    traMADol (Ultram) 50 mg tablet, Take 1 tablet (50 mg) by mouth every 8 hours if needed., Disp: , Rfl:     Review of Systems:   Constitutional: Denies fever, chills   Eyes: Denies dry eyes, pain in the eyes   ENT: Denies dry mouth, loss of taste, sores in the mouth  Cardiovascular: Denies chest pain, palpitations   Respiratory: Denies shortness of breath   Gastrointestinal: Denies heartburn   Integumentary: Denies photosensitivity, rash or lesions, Raynaud's   Neurological: Denies any numbness or tingling    MSK: As per HPI.     All 10 review of systems have been reviewed and are negative for complaint except as noted in the HPI    Objective   Physical Examination:  Vitals:    11/05/24 1011   BP: (!) 184/106   Pulse: 60   Temp: 36.6 °C (97.9 °F)     Growth %ile SmartLinks can only be used for patients less than 20 years old.  Ht Readings from Last 1 Encounters:   11/05/24 1.702 m (5' 7\")     Wt Readings from Last 1 Encounters:   11/05/24 78.9 kg (174 lb)       General - NAD, sitting up in chair, well-groomed, pleasant, AAOx3  Head: Normocephalic, atraumatic  Eyes - PERRLA, EOMI. No conjunctiva injection.   Cardiovascular - Normal S1, S2. Regular rate and rhythm. No murmurs or rubs.  Lungs - Symmetric chest expansion. Clear to auscultation bilaterally.   Skin - No rashes or ulcers. Skin warm and dry. No erythema on bilateral cheeks.  Extremities - No edema, cyanosis ,or clubbing  Neurological - Alert and oriented x 3,  grossly intact. No focal deficit.  Musculoskeletal " "-  Shoulders: Full ROM, without pain, no swelling, warmth or tenderness.  Elbows: Full ROM, without pain, no swelling, warmth or tenderness.  Wrists: right wrist synovial thickening. Limited extension/flexion.   MCP: No swelling, warmth or tenderness. Metacarpal squeeze negative  PIP: No swelling, warmth or tenderness.  DIP: No swelling, warmth or tenderness. OA changes  Hands : 5/5.    Sacroiliac joints: No local tenderness. RAMANDEEP negative.   Hips: Full ROM.  No malalignment.  Knees:  Full ROM, without pain, no swelling, warmth or point tenderness.   Ankles: Full ROM, without pain, swelling, warmth or tenderness.  Toes:  Metatarsal squeeze positive (right)  Cervical spine: No tenderness or limitation of movement  Lumbar spine: No tenderness or limitation of movement        Laboratory Testing:  10/2024: CMP with low calcium 8.3, CBC normal,     Imaging:  MRI Right wrist 9/2024:  IMPRESSION:  1. Erosive changes of the scaphoid, trapezoid, capitate, and  triquetrum with prominent cystic changes likely from inflammatory  arthropathy.  2. Severe osteoarthrosis of the radioscaphoid articulation and  intercarpal joints. Mild osteoarthrosis of the carpometacarpal joints.  3. Moderate joint effusion of the intercarpal and distal radioulnar  joints.    Assessment/Plan   Martinez Potter \"Steven\" is a 74 y.o. male with PMHx of HTN, SNHL, GERD, vitamin D deficiency, Lumbar spondylosis, hypopituitarism, possible multiple myeloma, Asthma, and seronegative RA who is presenting today as a NP for RA    ##? Rheumatoid arthritis vs erosive OA:  -Manifested by possible elbow nodules (reported but now resolved), right wrist pain and swelling episodes, polyarthralgia. MRI of right wrist showing erosions  -Currently stable on MTX and HCQ  -Reviewed labs from 10/2024: CMP with low calcium 8.3, CBC normal,  -Order hand/feet xray's  -MRI of right wrist reviewed as above and consistent with inflammatory arthritis  -I will check ESR/CRP/RF/CCP "   -May need to escalate therapy given the wrist erosions, however, he is now undergoing possible multiple myeloma evaluation and will have to be careful.   -His [facility]st [facility] pain is likely 2/2 OA and can consider cortisone injections   -Encouraged to increase exercise and physical activity  -Advised to improve cardiovascular risk factors (smoking, BP, diabetes, lipids..etc)      ##Medical management of high risk medication:  -Drug name: MTX subq  -Tolerating injection site and no side effects  -Labs reviewed   -Most recent TB/hepatitis test: to be updated  -Patient is advised to hold therapy if they experience any signs of infection requiring antibiotic therapy, and to resume after full recovery and conclusion of antibiotic course. Patient is advised to contact provider in this case.  -Educated the patient about risks and benefits of such therapy, and that the benefits of this therapy outweigh the risks and thus we will continue.    ##HCQ long term use:  -Educated the patient about the potential side effects of using antimalarials. Serious side effects are extremely rare. However, the fear of vision-threatening toxic retinopathy remains a major concern, this can be avoided when using the appropriate doses along with routine ocular monitoring.  -Current dose is 400 mg daily (<5mg/kg/day)  -Last eye exam: 5/2024      The assessment and plan, risk and benefits were discussed with the patient. All of the patients questions were answered and patient agrees to the plan.      Melanie Perez MD  Clinical   Department of Rheumatology   [facility]

## 2024-11-06 DIAGNOSIS — M06.00 RHEUMATOID ARTHRITIS WITH NEGATIVE RHEUMATOID FACTOR, INVOLVING UNSPECIFIED SITE (MULTI): ICD-10-CM

## 2024-11-06 DIAGNOSIS — M19.90 INFLAMMATORY ARTHRITIS: Primary | ICD-10-CM

## 2024-11-06 LAB — ANA SER QL HEP2 SUBST: NEGATIVE

## 2024-11-08 ENCOUNTER — SPECIALTY PHARMACY (OUTPATIENT)
Dept: PHARMACY | Facility: CLINIC | Age: 74
End: 2024-11-08

## 2024-11-08 LAB
NIL(NEG) CONTROL SPOT COUNT: NORMAL
PANEL A SPOT COUNT: 0
PANEL B SPOT COUNT: 3
POS CONTROL SPOT COUNT: NORMAL
T-SPOT. TB INTERPRETATION: NEGATIVE

## 2024-11-12 ENCOUNTER — OFFICE VISIT (OUTPATIENT)
Dept: HEMATOLOGY/ONCOLOGY | Facility: CLINIC | Age: 74
End: 2024-11-12
Payer: MEDICARE

## 2024-11-12 VITALS
HEART RATE: 62 BPM | SYSTOLIC BLOOD PRESSURE: 173 MMHG | WEIGHT: 172.62 LBS | DIASTOLIC BLOOD PRESSURE: 90 MMHG | HEIGHT: 67 IN | RESPIRATION RATE: 16 BRPM | TEMPERATURE: 97.7 F | OXYGEN SATURATION: 96 % | BODY MASS INDEX: 27.09 KG/M2

## 2024-11-12 DIAGNOSIS — E23.0 HYPOPITUITARISM DUE TO PITUITARY TUMOR (MULTI): ICD-10-CM

## 2024-11-12 DIAGNOSIS — I10 PRIMARY HYPERTENSION: ICD-10-CM

## 2024-11-12 DIAGNOSIS — D49.7 HYPOPITUITARISM DUE TO PITUITARY TUMOR (MULTI): ICD-10-CM

## 2024-11-12 DIAGNOSIS — M06.00 SERONEGATIVE RHEUMATOID ARTHRITIS (MULTI): ICD-10-CM

## 2024-11-12 DIAGNOSIS — G25.0 ESSENTIAL TREMOR: ICD-10-CM

## 2024-11-12 DIAGNOSIS — D47.2 MONOCLONAL GAMMOPATHY PRESENT ON SERUM PROTEIN ELECTROPHORESIS: Primary | ICD-10-CM

## 2024-11-12 DIAGNOSIS — J45.909 UNCOMPLICATED ASTHMA, UNSPECIFIED ASTHMA SEVERITY, UNSPECIFIED WHETHER PERSISTENT (HHS-HCC): ICD-10-CM

## 2024-11-12 DIAGNOSIS — E78.2 MIXED HYPERLIPIDEMIA: ICD-10-CM

## 2024-11-12 DIAGNOSIS — D49.7 PITUITARY TUMOR: ICD-10-CM

## 2024-11-12 PROCEDURE — 3008F BODY MASS INDEX DOCD: CPT | Performed by: INTERNAL MEDICINE

## 2024-11-12 PROCEDURE — 1159F MED LIST DOCD IN RCRD: CPT | Performed by: INTERNAL MEDICINE

## 2024-11-12 PROCEDURE — 3077F SYST BP >= 140 MM HG: CPT | Performed by: INTERNAL MEDICINE

## 2024-11-12 PROCEDURE — 1125F AMNT PAIN NOTED PAIN PRSNT: CPT | Performed by: INTERNAL MEDICINE

## 2024-11-12 PROCEDURE — 3080F DIAST BP >= 90 MM HG: CPT | Performed by: INTERNAL MEDICINE

## 2024-11-12 PROCEDURE — 99204 OFFICE O/P NEW MOD 45 MIN: CPT | Performed by: INTERNAL MEDICINE

## 2024-11-12 PROCEDURE — 99417 PROLNG OP E/M EACH 15 MIN: CPT | Performed by: INTERNAL MEDICINE

## 2024-11-12 PROCEDURE — 99214 OFFICE O/P EST MOD 30 MIN: CPT | Performed by: INTERNAL MEDICINE

## 2024-11-12 ASSESSMENT — PAIN SCALES - GENERAL: PAINLEVEL_OUTOF10: 4

## 2024-11-12 NOTE — PROGRESS NOTES
"Patient ID: Steven Potter is a 74 y.o. male.  Referring Physician: Orion Pike, APRN-CNP  [address]  Department [place] [address]  Primary Care Provider: Tania Hawkins, DO  Visit Type: Initial Visit      Subjective    HPI Do I have multiple myeloma?    Review of Systems   Constitutional: Negative.    HENT:  Negative.     Eyes: Negative.    Respiratory: Negative.     Cardiovascular: Negative.    Gastrointestinal: Negative.    Endocrine: Negative.    Genitourinary: Negative.     Musculoskeletal: Negative.    Skin: Negative.    Neurological:  Positive for numbness.        Tremors   Hematological: Negative.    Psychiatric/Behavioral: Negative.          Objective   BSA: 1.92 meters squared  /90 (BP Location: Right arm, Patient Position: Sitting, BP Cuff Size: Adult)   Pulse 62   Temp 36.5 °C (97.7 °F) (Temporal)   Resp 16   Ht (S) 1.7 m (5' 6.93\")   Wt 78.3 kg (172 lb 9.9 oz)   SpO2 96%   BMI 27.09 kg/m²      has a past medical history of Acute bronchospasm (09/28/2018), Candidal stomatitis (05/28/2018), Contusion of right front wall of thorax, initial encounter (03/25/2021), Cramp and spasm (06/18/2021), Encounter for other general examination, Other injury of unspecified body region, initial encounter (08/07/2015), Other specified disorders of left ear (01/03/2020), Other specified disorders of temporomandibular joint (01/29/2020), Other specified symptoms and signs involving the circulatory and respiratory systems (09/28/2018), Otitis media, unspecified, left ear (10/04/2019), Personal history of other diseases of male genital organs, Personal history of other diseases of the nervous system and sense organs (02/10/2017), Personal history of other diseases of the respiratory system (09/28/2018), Personal history of other diseases of the respiratory system (10/04/2019), Personal history of other diseases of the respiratory system (12/19/2019), Personal history of other " "specified conditions, Personal history of other specified conditions (02/16/2021), Personal history of other specified conditions (11/28/2019), Personal history of other specified conditions (11/28/2019), Personal history of other specified conditions (12/19/2019), Personal history of other specified conditions (09/28/2018), Personal history of other specified conditions (09/28/2018), Personal history of other specified conditions (09/28/2018), Spinal stenosis, lumbosacral region (10/07/2021), Tinnitus, left ear (01/29/2020), and Unspecified injury of thorax, initial encounter (03/25/2021).   has a past surgical history that includes Other surgical history (03/13/2018); Tonsillectomy (03/13/2018); Total knee arthroplasty (03/13/2018); and Other surgical history (11/27/2019).  No family history on file.  Oncology History    No history exists.       Martinez Potter \"Steven\"  reports that he has never smoked. He has never used smokeless tobacco.  He  reports current alcohol use.  He  reports no history of drug use.    Physical Exam  Vitals reviewed.   Constitutional:       Appearance: Normal appearance.   HENT:      Head: Normocephalic.      Mouth/Throat:      Mouth: Mucous membranes are moist.   Eyes:      Extraocular Movements: Extraocular movements intact.      Pupils: Pupils are equal, round, and reactive to light.   Cardiovascular:      Rate and Rhythm: Normal rate and regular rhythm.      Pulses: Normal pulses.      Heart sounds: Normal heart sounds.   Pulmonary:      Effort: Pulmonary effort is normal.      Breath sounds: Normal breath sounds.   Abdominal:      General: Bowel sounds are normal.      Palpations: Abdomen is soft.   Musculoskeletal:         General: Normal range of motion.      Cervical back: Normal range of motion and neck supple.   Skin:     General: Skin is warm.   Neurological:      General: No focal deficit present.      Mental Status: He is alert and oriented to person, place, and time. "   Psychiatric:         Mood and Affect: Mood normal.         Behavior: Behavior normal.         WBC   Date/Time Value Ref Range Status   11/05/2024 11:15 AM 6.8 4.4 - 11.3 x10*3/uL Final   10/29/2024 09:34 AM 8.9 4.4 - 11.3 x10*3/uL Final   11/23/2022 03:31 PM 11.5 (H) 4.4 - 11.3 x10E9/L Final   05/03/2022 03:33 PM 7.7 4.4 - 11.3 x10E9/L Final   02/07/2022 12:30 PM 10.3 4.4 - 11.3 x10E9/L Final     nRBC   Date Value Ref Range Status   11/05/2024 0.0 0.0 - 0.0 /100 WBCs Final   10/29/2024 0.0 0.0 - 0.0 /100 WBCs Final   11/23/2022 0.0 0.0 - 0.0 /100 WBC Final   02/07/2022 0.0 0.0 - 0.0 /100 WBC Final   01/29/2020 0.0 0.0 - 0.0 /100 WBC Final     RBC   Date Value Ref Range Status   11/05/2024 4.53 4.50 - 5.90 x10*6/uL Final   10/29/2024 4.41 (L) 4.50 - 5.90 x10*6/uL Final   11/23/2022 4.75 4.50 - 5.90 x10E12/L Final   05/03/2022 4.67 4.50 - 5.90 x10E12/L Final   02/07/2022 5.26 4.50 - 5.90 x10E12/L Final     Hemoglobin   Date Value Ref Range Status   11/05/2024 15.2 13.5 - 17.5 g/dL Final   10/29/2024 14.4 13.5 - 17.5 g/dL Final   11/23/2022 15.9 13.5 - 17.5 g/dL Final   05/03/2022 15.1 13.5 - 17.5 g/dL Final   02/07/2022 16.5 13.5 - 17.5 g/dL Final     Hematocrit   Date Value Ref Range Status   11/05/2024 44.5 41.0 - 52.0 % Final   10/29/2024 43.6 41.0 - 52.0 % Final   11/23/2022 48.3 41.0 - 52.0 % Final   05/03/2022 47.1 41.0 - 52.0 % Final   02/07/2022 50.6 41.0 - 52.0 % Final     MCV   Date/Time Value Ref Range Status   11/05/2024 11:15 AM 98 80 - 100 fL Final   10/29/2024 09:34 AM 99 80 - 100 fL Final   11/23/2022 03:31  (H) 80 - 100 fL Final   05/03/2022 03:33  (H) 80 - 100 fL Final   02/07/2022 12:30 PM 96 80 - 100 fL Final     MCH   Date/Time Value Ref Range Status   11/05/2024 11:15 AM 33.6 26.0 - 34.0 pg Final   10/29/2024 09:34 AM 32.7 26.0 - 34.0 pg Final     MCHC   Date/Time Value Ref Range Status   11/05/2024 11:15 AM 34.2 32.0 - 36.0 g/dL Final   10/29/2024 09:34 AM 33.0 32.0 - 36.0 g/dL  "Final   11/23/2022 03:31 PM 32.9 32.0 - 36.0 g/dL Final   05/03/2022 03:33 PM 32.1 32.0 - 36.0 g/dL Final   02/07/2022 12:30 PM 32.6 32.0 - 36.0 g/dL Final     RDW   Date/Time Value Ref Range Status   11/05/2024 11:15 AM 13.2 11.5 - 14.5 % Final   10/29/2024 09:34 AM 13.2 11.5 - 14.5 % Final   11/23/2022 03:31 PM 14.5 11.5 - 14.5 % Final   05/03/2022 03:33 PM 14.6 (H) 11.5 - 14.5 % Final   02/07/2022 12:30 PM 13.1 11.5 - 14.5 % Final     Platelets   Date/Time Value Ref Range Status   11/05/2024 11:15  150 - 450 x10*3/uL Final   10/29/2024 09:34  150 - 450 x10*3/uL Final   11/23/2022 03:31  150 - 450 x10E9/L Final   05/03/2022 03:33  150 - 450 x10E9/L Final   02/07/2022 12:30  150 - 450 x10E9/L Final     No results found for: \"MPV\"  Neutrophils %   Date/Time Value Ref Range Status   11/05/2024 11:15 AM 68.1 40.0 - 80.0 % Final   10/29/2024 09:34 AM 70.7 40.0 - 80.0 % Final     Immature Granulocytes %, Automated   Date/Time Value Ref Range Status   11/05/2024 11:15 AM 0.9 0.0 - 0.9 % Final     Comment:     Immature Granulocyte Count (IG) includes promyelocytes, myelocytes and metamyelocytes but does not include bands. Percent differential counts (%) should be interpreted in the context of the absolute cell counts (cells/UL).   10/29/2024 09:34 AM 1.2 (H) 0.0 - 0.9 % Final     Comment:     Immature Granulocyte Count (IG) includes promyelocytes, myelocytes and metamyelocytes but does not include bands. Percent differential counts (%) should be interpreted in the context of the absolute cell counts (cells/UL).     Lymphocytes %   Date/Time Value Ref Range Status   11/05/2024 11:15 AM 18.1 13.0 - 44.0 % Final   10/29/2024 09:34 AM 13.3 13.0 - 44.0 % Final     Monocytes %   Date/Time Value Ref Range Status   11/05/2024 11:15 AM 11.8 2.0 - 10.0 % Final   10/29/2024 09:34 AM 13.8 2.0 - 10.0 % Final     Eosinophils %   Date/Time Value Ref Range Status   11/05/2024 11:15 AM 0.4 0.0 - 6.0 % Final " "  10/29/2024 09:34 AM 0.3 0.0 - 6.0 % Final     Basophils %   Date/Time Value Ref Range Status   11/05/2024 11:15 AM 0.7 0.0 - 2.0 % Final   10/29/2024 09:34 AM 0.7 0.0 - 2.0 % Final     Neutrophils Absolute   Date/Time Value Ref Range Status   11/05/2024 11:15 AM 4.61 1.60 - 5.50 x10*3/uL Final     Comment:     Percent differential counts (%) should be interpreted in the context of the absolute cell counts (cells/uL).   10/29/2024 09:34 AM 6.31 (H) 1.60 - 5.50 x10*3/uL Final     Comment:     Percent differential counts (%) should be interpreted in the context of the absolute cell counts (cells/uL).     Immature Granulocytes Absolute, Automated   Date/Time Value Ref Range Status   11/05/2024 11:15 AM 0.06 0.00 - 0.50 x10*3/uL Final   10/29/2024 09:34 AM 0.11 0.00 - 0.50 x10*3/uL Final     Lymphocytes Absolute   Date/Time Value Ref Range Status   11/05/2024 11:15 AM 1.23 0.80 - 3.00 x10*3/uL Final   10/29/2024 09:34 AM 1.19 0.80 - 3.00 x10*3/uL Final     Monocytes Absolute   Date/Time Value Ref Range Status   11/05/2024 11:15 AM 0.80 0.05 - 0.80 x10*3/uL Final   10/29/2024 09:34 AM 1.23 (H) 0.05 - 0.80 x10*3/uL Final     Eosinophils Absolute   Date/Time Value Ref Range Status   11/05/2024 11:15 AM 0.03 0.00 - 0.40 x10*3/uL Final   10/29/2024 09:34 AM 0.03 0.00 - 0.40 x10*3/uL Final     Basophils Absolute   Date/Time Value Ref Range Status   11/05/2024 11:15 AM 0.05 0.00 - 0.10 x10*3/uL Final   10/29/2024 09:34 AM 0.06 0.00 - 0.10 x10*3/uL Final       No components found for: \"PT\"  aPTT   Date/Time Value Ref Range Status   01/22/2022 10:04 AM 35 26 - 39 sec Final     Comment:     Note new reference range as of 11/30/2021 at 10:00am.     Medication Documentation Review Audit       Reviewed by Cinthia Latif MA (Medical Assistant) on 11/12/24 at 1127      Medication Order Taking? Sig Documenting Provider Last Dose Status   albuterol 90 mcg/actuation inhaler [id number] Yes Inhale 2 puffs. Historical Provider, MD Taking " "Active   amLODIPine (Norvasc) 2.5 mg tablet [id number] Yes Take 1 tablet (2.5 mg) by mouth once daily. Historical Provider, MD Taking Active   Arexvy, PF, 120 mcg/0.5 mL suspension for reconstitution [id number] Yes  Historical Provider, MD  Active   budesonide-formoteroL (Symbicort) 160-4.5 mcg/actuation inhaler [id number] Yes Inhale 2 puffs twice a day. Historical Provider, MD Taking Active   desmopressin (DDAVP) 0.1 mg tablet [id number] Yes Take 2 tablets (0.2 mg) by mouth 2 times a day. Isabel Faustin MD Taking Active   fluticasone (Flonase Allergy Relief) 50 mcg/actuation nasal spray [id number] Yes Administer 2 sprays into each nostril once daily. Bijan Provider, MD Taking Active   folic acid (Folvite) 1 mg tablet [id number] Yes Take 1 tablet (1 mg) by mouth once daily. Melanie Perez MD  Active   hydrocortisone (Cortef) 10 mg tablet [id number] Yes Take 1 tablet (10 mg) by mouth once daily in the morning. Take before meals. Isabel Faustin MD Taking Active   hydrocortisone (Cortef) 5 mg tablet [id number] Yes TAKE 1 & 1/2 TABLETS AROUND 1PM AND 1/2 TABLET IN THE AFTERNOON Isabel Faustin MD Taking Active   hydrocortisone (Cortef) 5 mg tablet [id number] Yes TAKE 1 & 1/2 TABLETS AROUND 1PM AND 1/2 TABLET IN THE AFTERNOON Isabel Faustin MD Taking Active   hydroxychloroquine (Plaquenil) 200 mg tablet [id number] Yes Take 2 tablets (400 mg) by mouth once daily. Melanie Perez MD  Active   insulin syringe-needle U-100 1 mL 28 gauge x 1/2\" syringe [id number] Yes AS DIRECTED 30 DAYS Historical Provider, MD  Active   levothyroxine (Synthroid, Levoxyl) 150 mcg tablet [id number] Yes Take 1 tablet (150 mcg) by mouth once daily in the morning. Take before meals. Isabel Faustin MD Taking Active   methotrexate 25 mg/mL [id number] Yes 0.7 ML INJECTION ONCE A WEEK 30 DAYS Historical Provider, MD  Active   methotrexate 25 mg/mL injection [id number] Yes Inject 50 mg/m2 into the muscle 1 (one) time per week. Historical Provider, MD Taking " "Active   methotrexate, PF, 17.5 mg/0.35 mL auto-injector [id number] Yes Inject 17.5 mg under the skin 1 (one) time per week. Melanie Perez MD  Active     Discontinued 11/06/24 1430   montelukast (Singulair) 10 mg tablet [id number] Yes Take 1 tablet (10 mg) by mouth once daily at bedtime. Historical Provider, MD Taking Active   multivitamin with minerals iron-free (Multivitamin 50 Plus) [id number] Yes Take 1 tablet by mouth once daily. Historical Provider, MD Taking Active   primidone (Mysoline) 50 mg tablet [id number] Yes 25 mg at bedtime for a week, then 50 mg at bedtime for a week, then 75mg at bedtime for one week, then finally 100mg at bedtime. Kel Tristan MD Taking Active   rosuvastatin (Crestor) 10 mg tablet [id number] Yes Take 1 tablet (10 mg) by mouth early in the morning.. Historical Provider, MD  Active   sulfaSALAzine (Azulfidine) 500 mg tablet [id number]  TAKE 1 TAB BY MOUTH 2X/DAY X1 WEEK THEN IF NO SIDE EFFECTS INCREASE TO 2 TABS 2X/DAY   Patient not taking: Reported on 11/12/2024    Historical Provider, MD  Active   syringe with needle 3 mL 22 x 1 1/2\" syringe [id number] Yes Use as directed every 2 weeks. Isabel Faustin MD Taking Active   testosterone cypionate (Depo-Testosterone) 200 mg/mL injection [id number] Yes Inject 0.5 mL (100 mg) into the muscle every 14 (fourteen) days. Isabel Faustin MD Taking Active   traMADol (Ultram) 50 mg tablet [id number] Yes Take 1 tablet (50 mg) by mouth every 8 hours if needed. Historical Provider, MD Taking Active                   Assessment/Plan    1) MGUS  -I reviewed his lab history in the EMR  -9/28/2018 wbc 7.8, hgb 15, plt 286,000, creatinine 0.83, calcium  9.7  -6/14/2019 creatinine 0.75, calcium 9.7, wbc 9.6, hgb 16.5, plt 296,000  -1/29/2020 wbc 8.5, hgb 14.9, plt 282,000  -2/7/2022 wbc 10.3, hgb 16.5, plt 279,000, creatinine 0.78, calcium 8.9  -5/3/2022 wbc 7.7, hgb 15.1, plt 299,000  -10/27/2022 creatinine 0.72, calcium 8.6  -11/23/2022 wbc 11.5, hgb 15.9, " plt 290,000, creatinine 0.94, calcium 8.9  -11/11/2023 creatinine 0.9, calcium 9.3  -3/21/2024 creatinine 0.9, calcium 9.3  -7/22/2024 creatinine 0.78, calcium 9.2  -9/12/2024 creatinine 0.92, calcium 9.4  -10/22/2024 SPEP/IF with 0.1 g/dl IgM kappa M protein  -neuro checked SPEP/IF as part of workup for tremors/neuropathy, even though multiple myeloma doesn't really cause tremors/neuropathy  -if multiple myeloma were to even be associated with neuropathy, the M protein also would be much higher than what he has  -he was willing to go to the lab first and on 10/29/2024 he had CBC, COMP, serum FLC, quantitative immunoglobulins and spot urine for UPEP done  -results reviewed--wbc 8.9, hgb 14.4, plt 310,000, creatinine 0.73, calcium 8.3 alk phos 60,  AST 31, ALT 33, kappa LC 1.01, lambda LC 0.73, spot urine for UPEP negative  -his very minuscule MGUS is most likely secondary to rheumatoid arthritis  -MGUS in general has 1% risk per year for evolving/transforming into multiple myeloma, risk even less for secondary MGUS  -will see him again in 1 year    2) tremors  -on primidone    3) hypertension  -on amlodipine    4) asthma  -on albuterol  -on symbicort  -on flonase  -on singulair    5) hyperlipidemia  -on crestor    6) rheumatoid arthritis  -on plaquenil  -on methotrexate     7) hypopituitarism  -s/p resection of pituitary tumor in 1980s  -on testosterone  -on levothyroxine  -on DDAVP  -on hydrocortisone    Problem List Items Addressed This Visit    None  Visit Diagnoses         Codes    Monoclonal gammopathy present on serum protein electrophoresis     D47.2    Relevant Orders    Clinic Appointment Request Follow Up; AB FLORES; [facility]    CBC and Auto Differential    Comprehensive metabolic panel    Serum Protein Electrophoresis    Immunoglobulins (IgG, IgA, IgM)    [place]/Lambda Free Light Chain, Serum                 Ab Flores MD

## 2024-11-17 PROBLEM — D47.2 MONOCLONAL GAMMOPATHY PRESENT ON SERUM PROTEIN ELECTROPHORESIS: Status: ACTIVE | Noted: 2024-11-17

## 2024-11-17 PROBLEM — E23.0: Status: ACTIVE | Noted: 2024-11-17

## 2024-11-17 PROBLEM — D49.7: Status: ACTIVE | Noted: 2024-11-17

## 2024-11-17 PROBLEM — E78.2 MIXED HYPERLIPIDEMIA: Status: ACTIVE | Noted: 2024-11-17

## 2024-11-17 ASSESSMENT — ENCOUNTER SYMPTOMS
EYES NEGATIVE: 1
CARDIOVASCULAR NEGATIVE: 1
RESPIRATORY NEGATIVE: 1
NUMBNESS: 1
HEMATOLOGIC/LYMPHATIC NEGATIVE: 1
ENDOCRINE NEGATIVE: 1
GASTROINTESTINAL NEGATIVE: 1
CONSTITUTIONAL NEGATIVE: 1
MUSCULOSKELETAL NEGATIVE: 1
PSYCHIATRIC NEGATIVE: 1

## 2024-11-20 DIAGNOSIS — M06.00 RHEUMATOID ARTHRITIS WITH NEGATIVE RHEUMATOID FACTOR, INVOLVING UNSPECIFIED SITE (MULTI): Primary | ICD-10-CM

## 2024-11-20 RX ORDER — METHOTREXATE 17.5 MG/.35ML
17.5 INJECTION, SOLUTION SUBCUTANEOUS WEEKLY
Qty: 4 EACH | Refills: 2 | Status: SHIPPED | OUTPATIENT
Start: 2024-11-20

## 2024-12-12 ENCOUNTER — APPOINTMENT (OUTPATIENT)
Dept: ENDOCRINOLOGY | Facility: CLINIC | Age: 74
End: 2024-12-12
Payer: MEDICARE

## 2024-12-16 ENCOUNTER — APPOINTMENT (OUTPATIENT)
Dept: NEUROLOGY | Facility: CLINIC | Age: 74
End: 2024-12-16
Payer: MEDICARE

## 2024-12-16 VITALS
SYSTOLIC BLOOD PRESSURE: 151 MMHG | HEIGHT: 67 IN | DIASTOLIC BLOOD PRESSURE: 89 MMHG | BODY MASS INDEX: 26.37 KG/M2 | HEART RATE: 81 BPM | WEIGHT: 168 LBS

## 2024-12-16 DIAGNOSIS — G25.0 ESSENTIAL TREMOR: ICD-10-CM

## 2024-12-16 DIAGNOSIS — G62.9 NEUROPATHY: ICD-10-CM

## 2024-12-16 DIAGNOSIS — H93.13 TINNITUS OF BOTH EARS: ICD-10-CM

## 2024-12-16 DIAGNOSIS — R26.81 GAIT INSTABILITY: ICD-10-CM

## 2024-12-16 DIAGNOSIS — I95.1 ORTHOSTATIC HYPOTENSION: Primary | ICD-10-CM

## 2024-12-16 DIAGNOSIS — R42 DIZZINESS: ICD-10-CM

## 2024-12-16 PROCEDURE — 3079F DIAST BP 80-89 MM HG: CPT | Performed by: PSYCHIATRY & NEUROLOGY

## 2024-12-16 PROCEDURE — 1159F MED LIST DOCD IN RCRD: CPT | Performed by: PSYCHIATRY & NEUROLOGY

## 2024-12-16 PROCEDURE — G2211 COMPLEX E/M VISIT ADD ON: HCPCS | Performed by: PSYCHIATRY & NEUROLOGY

## 2024-12-16 PROCEDURE — 1036F TOBACCO NON-USER: CPT | Performed by: PSYCHIATRY & NEUROLOGY

## 2024-12-16 PROCEDURE — 3008F BODY MASS INDEX DOCD: CPT | Performed by: PSYCHIATRY & NEUROLOGY

## 2024-12-16 PROCEDURE — 3077F SYST BP >= 140 MM HG: CPT | Performed by: PSYCHIATRY & NEUROLOGY

## 2024-12-16 PROCEDURE — 99215 OFFICE O/P EST HI 40 MIN: CPT | Performed by: PSYCHIATRY & NEUROLOGY

## 2024-12-16 ASSESSMENT — PATIENT HEALTH QUESTIONNAIRE - PHQ9
SUM OF ALL RESPONSES TO PHQ9 QUESTIONS 1 & 2: 0
2. FEELING DOWN, DEPRESSED OR HOPELESS: NOT AT ALL
1. LITTLE INTEREST OR PLEASURE IN DOING THINGS: NOT AT ALL
1. LITTLE INTEREST OR PLEASURE IN DOING THINGS: NOT AT ALL
SUM OF ALL RESPONSES TO PHQ9 QUESTIONS 1 AND 2: 0
2. FEELING DOWN, DEPRESSED OR HOPELESS: NOT AT ALL

## 2024-12-16 ASSESSMENT — FAHN TOLOSA MARTIN TREMOR (FTM)
HEAD AT POSTURE WHEN STANDING OR SITTING: 2
HANDWRITING WITH DOMINANT HAND: 1
PART A SUBTOTAL SCORE: 8
DRAWING A RIGHT SCORE: 0
DRAWING C RIGHT SCORE: 0
UE ARMS OUTSTRECHED WRISTS MILDLY EXTENDED FINGERS SPREAD APART: 1
DRAWING A TOTAL SCORE: 0
DRAWING A LEFT SCORE: 0
TRUNK IN REPOSE: 0
FACE IN REPOSE: 0
LE AT REST: 0
FACE TOTAL: 0
UE FINGER TO NOSE AND OTHER ACTIONS: 1
LE TOE TO FINGER IN A FLEXED POSTURE: 0
DRAWING C TOTAL SCORE: 0
RLE TOTAL SCORE: 0
RUE TOTAL SCORE: 2
LUE TOTAL SCORE: 2
DRAWING B RGHT SCORE: 1
DRAWING C LEFT SCORE: 0
UE FINGER TO NOSE AND OTHER ACTIONS: 1
TRUNK  WHEN SITTING OR STANDING: 0
POURING SCORE: 0
DRAWING B LEFT SCORE: 1
TOUNGE TOTAL SCORE: 1
DRAWING B TOTAL SCORE: 2
UE ARM AT REST: 0
HEAD TOTAL SCORE: 2
VOICE IN ACTION: 1
TOUNGE WHEN PROTUDED: 1
TRUNK TOTAL SCORE: 0
UE ARM AT REST: 0
UE ARMS OUTSTRECHED WRISTS MILDLY EXTENDED FINGERS SPREAD APART: 1
LE LEG FLEXED AT HIPS AND KNEES AT POSTURE: 0
LE AT REST: 0
HEAD IN REPOSE: 0
LE LEG FLEXED AT HIPS AND KNEES AT POSTURE: 0
FACE AT POSTURE WHEN STANDING OR SITTING: 0
TOUNGE AT REST: 0
LE TOE TO FINGER IN A FLEXED POSTURE: 0
PART B SUBTOTAL SCORE: 3
LLE TOTAL SCORE: 0
VOICE TOTAL SCORE: 1

## 2024-12-16 ASSESSMENT — ENCOUNTER SYMPTOMS
DEPRESSION: 0
OCCASIONAL FEELINGS OF UNSTEADINESS: 0
LOSS OF SENSATION IN FEET: 0

## 2024-12-16 NOTE — PROGRESS NOTES
Subjective     Martinez Potter is a right handed  74 y.o. year old male w/ PMH pituitary adenoma s/p resection in 1980s, asthma, HTN, rheumatoid arthritis who presents with Follow-up (FOLLOW UP). Patient is accompanied by: spouse  Visit type: follow up.     Recently seen by Orion Pike NP for worsening balance, found to have neuropathy on exam, work up orders, SPEP abnormal, seen by hematology already, and EMG/NCS pending.  She also weaned off primidone to see if this caused worsening of balance, and his tremor worsened and balance did not improve, so he is back on Primidone 100 mg at bedtime now, w great tremor benefit.     Tremor:  Has been having shaking in his hands, causing interference in his bilateral hands. Has good days and bad days, stating that on bad days his whole body feels like its shaking. Has been having more frequent and worsening bad days. Mostly with fine motor tasks, especially using screwdriver and eating. Worse with anxiety. Does not note any specific trigger to his tremor. No apparent rest component. Not noticeably changed with alcohol, but only drinks one glass of wine occasionally.  Tremor is well controlled with current dose of meds.       Balance disorder:  Has bilateral ear ringing, it is constant.   Used to be on and off but has been constant for 8 months.   Used to have it at work - when he was around heavy machinery.   Hearing is good he thinks, however was at a party last weekend - couldn't hear people and talk to them, all the competing sounds get jumbled.     Has mild numbness of feet and has radiculopathy of lower limbs, (has had injections for sciatica - last one was done on Thursday), usually benefit lasts for one year.    Has had episodes of feeling like everything is wobbly, not a spinning sensation. Does fel like it is like being on a boat,, but a little faster.       Did try going off the primidone for a week, and it did not improve the gait instability - and he is back  on the dose. His tremor got a lot worse also.       Has had tilt table testing for orthostatic hypotension which was positive. No other falls recently. Endorses some unsteadiness associated with standing up too fast or standing still for too long.     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  Denies any hypophonia or decreased size of handwriting. No diarrhea or constipation. Does move around a lot in sleep (states since childhood). Not clearly acting out dreams. No significant changes in cognitive function. Endorses frustration with tremor, but denies depression or anxiety. Drinks one cup of coffee a day.    ROS otherwise negative except as stated above.    No known family history of tremor. Has two brothers that are not in regular contact. No family history of Parkinson's.    Social Hx: Second hand smoke as child, otherwise no tobacco. Occasional glass of wine, 3x/week. No recreational substance use.    Patient Active Problem List   Diagnosis    Essential tremor    Hypogonadism in male    Vertigo    Acute sinusitis    AK (actinic keratosis)    Benign essential hypertension    Benign prostatic hyperplasia without urinary obstruction    Bilateral leg edema    Bilateral vocal cord paresis    Chest pain    Chronic back pain    Chronic pain syndrome    Colon polyps    Congestion of paranasal sinus    Deviated nasal septum    Diabetes insipidus    Dyspnea on exertion    Elbow pain, right    Epistaxis    Facial pain    Gastroesophageal reflux disease    H/O: stroke    Hoarseness of voice    HTN (hypertension)    Hypopituitarism (Multi)    Panhypopituitarism (Multi)    Pituitary tumor    Secondary hypothyroidism    Laceration of right forearm    Lumbar post-laminectomy syndrome    Lumbar radiculopathy    Lumbosacral spinal stenosis    Lumbosacral spondylosis    Neoplasm of uncertain behavior of skin    Nocturia    Cervical dysphagia    Disorder of pharynx    Dysphagia    Orthostatic hypotension     Pain in right shoulder    Pain in right wrist    Asthma    Persistent cough    Pituitary adenoma (Multi)    Rib pain on right side    Sacroiliitis, not elsewhere classified (CMS-HCC)    Secondary hypocortisolism (Multi)    Sensorineural hearing loss (SNHL) of left ear with unrestricted hearing of right ear    Seronegative rheumatoid arthritis (Multi)    Solar purpura (CMS-HCC)    Syncope and collapse    Ureteral stone    Vitamin D deficiency    Monoclonal gammopathy present on serum protein electrophoresis    Mixed hyperlipidemia    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)      Past Medical History:   Diagnosis Date    Acute bronchospasm 09/28/2018    Cough due to bronchospasm    Candidal stomatitis 05/28/2018    Thrush    Contusion of right front wall of thorax, initial encounter 03/25/2021    Rib contusion, right, initial encounter    Cramp and spasm 06/18/2021    Muscle cramps    Encounter for other general examination     Rectal exam    Other injury of unspecified body region, initial encounter 08/07/2015    Musculoskeletal strain    Other specified disorders of left ear 01/03/2020    Sensation of fullness in left ear    Other specified disorders of temporomandibular joint 01/29/2020    TMJ inflammation    Other specified symptoms and signs involving the circulatory and respiratory systems 09/28/2018    Chest sounds abnormal on percussion or auscultation    Otitis media, unspecified, left ear 10/04/2019    Acute left otitis media    Personal history of other diseases of male genital organs     History of erectile dysfunction    Personal history of other diseases of the nervous system and sense organs 02/10/2017    History of tinnitus    Personal history of other diseases of the respiratory system 09/28/2018    History of acute sinusitis    Personal history of other diseases of the respiratory system 10/04/2019    History of acute sinusitis    Personal history of other diseases of the respiratory system  12/19/2019    History of paranasal sinus congestion    Personal history of other specified conditions     History of pituitary neoplasm    Personal history of other specified conditions 02/16/2021    History of epistaxis    Personal history of other specified conditions 11/28/2019    History of facial pain    Personal history of other specified conditions 11/28/2019    History of postnasal drip    Personal history of other specified conditions 12/19/2019    History of persistent cough    Personal history of other specified conditions 09/28/2018    History of persistent cough    Personal history of other specified conditions 09/28/2018    History of hoarseness    Personal history of other specified conditions 09/28/2018    History of vertigo    Spinal stenosis, lumbosacral region 10/07/2021    Lumbosacral spinal stenosis    Tinnitus, left ear 01/29/2020    Tinnitus of left ear    Unspecified injury of thorax, initial encounter 03/25/2021    Rib injury      Past Surgical History:   Procedure Laterality Date    OTHER SURGICAL HISTORY  03/13/2018    Excision Of Pituitary Gland Partial    OTHER SURGICAL HISTORY  11/27/2019    Sinus surgery    TONSILLECTOMY  03/13/2018    Tonsillectomy    TOTAL KNEE ARTHROPLASTY  03/13/2018    Knee Replacement      Social History     Socioeconomic History    Marital status:      Spouse name: Not on file    Number of children: Not on file    Years of education: Not on file    Highest education level: Not on file   Occupational History    Not on file   Tobacco Use    Smoking status: Never    Smokeless tobacco: Never   Substance and Sexual Activity    Alcohol use: Yes     Comment: RARELY    Drug use: Never    Sexual activity: Not on file   Other Topics Concern    Not on file   Social History Narrative    Not on file     Social Drivers of Health     Financial Resource Strain: Not on file   Food Insecurity: Not on file   Transportation Needs: Not on file   Physical Activity: Not on file  "  Stress: Not on file   Social Connections: Not on file   Intimate Partner Violence: Not on file   Housing Stability: Not on file      No family history on file.   Patient Health Questionnaire-2 Score: 0          Review of Systems  All other system have been reviewed and are negative for complaint.    Vitals:    24 0810   BP: 151/89   BP Location: Left arm   Patient Position: Sitting   Pulse: 81   Weight: 76.2 kg (168 lb)   Height: 1.702 m (5' 7\")     Objective   Neurological Exam  GENERAL: Resting comfortably, no acute distress    MENTAL STATE: Orientation was normal to person, place, situaiton, date.     Cranial nerves: extra ocular movements are intact, no nystagmus.     COORDINATION: Coordination exam was normal. In both upper extremities, finger-nose-finger was intact without dysmetria or overshoot. In both lower extremities, finger bob were normal. LAZARA intact bilaterally.    Gait: walks with a cane, slight slapping of feet when ambulates, narrow based, good stride length     Movement specific:  Mild bradykinesia w finger taps but no other decrements.   Mild no-no head tremor       [place] Tremor Rating scale:   PART A Face at rest: 0, Face at posture: 0, Face Total: 0, Tongue at rest: 0, Tongue at posture: 1, Tongue total: 1, Voice in action: 1, Voice total: 1, Head at rest: 0, Head at posture: 2, Head total: 2, RUE at rest: 0, RUE at posture: 1, RUE in action: 1, RUE total: 2, LUE at rest: 0, LUE at posture: 1, LUE in action: 1, LUE total: 2, Trunk at rest: 0, Trunk at posture: 0, Trunk total: 0, RLE at rest: 0, RLE at posture: 0, RLE in action: 0, RLE total: 0, LLE at rest: 0, LLE at posture: 0, LLE in action: 0, LLE total: 0, Part A subtotal: 8 PART B Handwriting dominant: 1, Drawing A - Right: 0, Drawing A - Left: 0, Drawing A total: 0, Drawing B - Right: 1, Drawing B - Left: 1, Drawing B total: 2, Drawing C - Right: 0, Drawing C - Left: 0, Pourin, Drawing C total: 0, Part B subtotal: " 3            Assessment/Plan   Diagnoses and all orders for this visit:  Orthostatic hypotension  Gait instability  -     Referral to Physical Therapy; Future  Neuropathy  -     Referral to Physical Therapy; Future  Essential tremor  -     Referral to ENT; Future  Tinnitus of both ears  -     Referral to ENT; Future  Dizziness  -     Referral to ENT; Future  -     Referral to Physical Therapy; Future        Martinez Potter is a 74 y.o. year old male here for follow up of tremor.  His tremor has responded excellently to primidone, so not an issue currently. Has longstanding balance issues, however has recently worsened and now needs to use a cane. Weaning off the primidone did not help his gait disorder. Work up has found neuropathy, lab work up completed and EMG/NCS is pending.   Also has MRI brain pending. On history also has tinnitus and hearing loss, so Meniere's disease also on differential here, does not describe vertigo, but certainly a sensation of  movement.   I will also initiate a vestibular therapy referral. Suspect balance disorder is multifactorial - related to OH, neuropathy  and possibly also essential tremor. No cerebellar signs on exam, but MRI brain is pending.     Plan:    Continue Primidone 100 mg at bedtime.     I agree with doing the EMG and nerve conduction study as well as the MRI brain that are both pending.     Referral to ENT for tinnitus, ? Meniere's  Continue to follow w cardiology for orthostatic hypotension.   Vestibular rehab therapy  RTC in 6 months.     Life style changes for management of orthostatic hypotension (OH):    1. Increase water intake.  People with OH need more water and should be drinking 3 quarts per day (~2.5 liters). Ideally, the best approach is to drink water and supplement it with salt . Diet (sugar-free) beverages are also acceptable. Tea and coffee increase urine output, so they may worsen her symptoms. Sports drinks, juices and non-diet beverages are not  recommended, due to their high-sugar content     2. Increase salt intake if cleared from a cardiac perspective (check with your PCP or cardiologist before making this change).    3. Try wearing compression stockings and an abdominal binder (particularly during mealtimes).    4.. Sleep with the head of the bed raised 30 degrees.    5. Try drinking 16 oz of cold water 30 minutes prior to getting out of bed in the morning as this will help to increase the blood pressure.     6 Physical/water exercise  It Is crucial to keep muscles active and for overall well-being. It includes:   - Recumbent exercises (e.g., recumbent bicycle, elastic bands, rowing machine, etc.).   - Water exercises. The hydrostatic pressure of the water will prevent the blood pressure from falling dramatically.   - Yoga and rafia chi     7. Avoid factors which decrease blood pressure and worsen OH:  - Hot and humid temperatures  - Physical exercises which cause blood pressure drop Dehydration (Prolonged standing and walking) .   - Alcohol  - High-glycemic index carbohydrates and heavy meals. It is advised to try eating several small meals (5-6) instead of the three traditional meals a day.

## 2024-12-16 NOTE — PATIENT INSTRUCTIONS
It was nice to see you today.   Continue Primidone 100 mg at bedtime.     I agree with doing the EMG and nerve conduction study as well as the MRI brain that are both pending.     Referral to ENT for tinnitus, ? Meniere's  Continue to follow w cardiology for orthostatic hypotension.   Vestibular rehab therapy  RTC in 6 months.     Life style changes for management of orthostatic hypotension (OH):    1. Increase water intake.  People with OH need more water and should be drinking 3 quarts per day (~2.5 liters). Ideally, the best approach is to drink water and supplement it with salt . Diet (sugar-free) beverages are also acceptable. Tea and coffee increase urine output, so they may worsen her symptoms. Sports drinks, juices and non-diet beverages are not recommended, due to their high-sugar content     2. Increase salt intake if cleared from a cardiac perspective (check with your PCP or cardiologist before making this change).    3. Try wearing compression stockings and an abdominal binder (particularly during mealtimes).    4.. Sleep with the head of the bed raised 30 degrees.    5. Try drinking 16 oz of cold water 30 minutes prior to getting out of bed in the morning as this will help to increase the blood pressure.     6 Physical/water exercise  It Is crucial to keep muscles active and for overall well-being. It includes:   - Recumbent exercises (e.g., recumbent bicycle, elastic bands, rowing machine, etc.).   - Water exercises. The hydrostatic pressure of the water will prevent the blood pressure from falling dramatically.   - Yoga and rafia chi     7. Avoid factors which decrease blood pressure and worsen OH:  - Hot and humid temperatures  - Physical exercises which cause blood pressure drop Dehydration (Prolonged standing and walking) .   - Alcohol  - High-glycemic index carbohydrates and heavy meals. It is advised to try eating several small meals (5-6) instead of the three traditional meals a day.

## 2024-12-16 NOTE — LETTER
December 16, 2024     Tania Hawkins,   [address]  [facility] [address]    Patient: Steven Potter   YOB: 1950   Date of Visit: 12/16/2024       Dear Dr. Tania Hawkins, DO:    Thank you for referring Steven Potter to me for evaluation. Below are my notes for this consultation.  If you have questions, please do not hesitate to call me. I look forward to following your patient along with you.       Sincerely,     Karyna Brasher MD      CC: No Recipients  ______________________________________________________________________________________    Subjective    Martinez Potter is a right handed  74 y.o. year old male w/ PMH pituitary adenoma s/p resection in 1980s, asthma, HTN, rheumatoid arthritis who presents with Follow-up (FOLLOW UP). Patient is accompanied by: spouse  Visit type: follow up.     Recently seen by Orion Pike NP for worsening balance, found to have neuropathy on exam, work up orders, SPEP abnormal, seen by hematology already, and EMG/NCS pending.  She also weaned off primidone to see if this caused worsening of balance, and his tremor worsened and balance did not improve, so he is back on Primidone 100 mg at bedtime now, w great tremor benefit.     Tremor:  Has been having shaking in his hands, causing interference in his bilateral hands. Has good days and bad days, stating that on bad days his whole body feels like its shaking. Has been having more frequent and worsening bad days. Mostly with fine motor tasks, especially using screwdriver and eating. Worse with anxiety. Does not note any specific trigger to his tremor. No apparent rest component. Not noticeably changed with alcohol, but only drinks one glass of wine occasionally.  Tremor is well controlled with current dose of meds.       Balance disorder:  Has bilateral ear ringing, it is constant.   Used to be on and off but has been constant for 8 months.   Used to have it at work - when he was  around heavy machinery.   Hearing is good he thinks, however was at a party last weekend - couldn't hear people and talk to them, all the competing sounds get jumbled.     Has mild numbness of feet and has radiculopathy of lower limbs, (has had injections for sciatica - last one was done on Thursday), usually benefit lasts for one year.    Has had episodes of feeling like everything is wobbly, not a spinning sensation. Does fel like it is like being on a boat,, but a little faster.       Did try going off the primidone for a week, and it did not improve the gait instability - and he is back on the dose. His tremor got a lot worse also.       Has had tilt table testing for orthostatic hypotension which was positive. No other falls recently. Endorses some unsteadiness associated with standing up too fast or standing still for too long.     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  Denies any hypophonia or decreased size of handwriting. No diarrhea or constipation. Does move around a lot in sleep (states since childhood). Not clearly acting out dreams. No significant changes in cognitive function. Endorses frustration with tremor, but denies depression or anxiety. Drinks one cup of coffee a day.    ROS otherwise negative except as stated above.    No known family history of tremor. Has two brothers that are not in regular contact. No family history of Parkinson's.    Social Hx: Second hand smoke as child, otherwise no tobacco. Occasional glass of wine, 3x/week. No recreational substance use.    Patient Active Problem List   Diagnosis   • Essential tremor   • Hypogonadism in male   • Vertigo   • Acute sinusitis   • AK (actinic keratosis)   • Benign essential hypertension   • Benign prostatic hyperplasia without urinary obstruction   • Bilateral leg edema   • Bilateral vocal cord paresis   • Chest pain   • Chronic back pain   • Chronic pain syndrome   • Colon polyps   • Congestion of paranasal sinus    • Deviated nasal septum   • Diabetes insipidus   • Dyspnea on exertion   • Elbow pain, right   • Epistaxis   • Facial pain   • Gastroesophageal reflux disease   • H/O: stroke   • Hoarseness of voice   • HTN (hypertension)   • Hypopituitarism (Multi)   • Panhypopituitarism (Multi)   • Pituitary tumor   • Secondary hypothyroidism   • Laceration of right forearm   • Lumbar post-laminectomy syndrome   • Lumbar radiculopathy   • Lumbosacral spinal stenosis   • Lumbosacral spondylosis   • Neoplasm of uncertain behavior of skin   • Nocturia   • Cervical dysphagia   • Disorder of pharynx   • Dysphagia   • Orthostatic hypotension   • Pain in right shoulder   • Pain in right wrist   • Asthma   • Persistent cough   • Pituitary adenoma (Multi)   • Rib pain on right side   • Sacroiliitis, not elsewhere classified (CMS-HCC)   • Secondary hypocortisolism (Multi)   • Sensorineural hearing loss (SNHL) of left ear with unrestricted hearing of right ear   • Seronegative rheumatoid arthritis (Multi)   • Solar purpura (CMS-HCC)   • Syncope and collapse   • Ureteral stone   • Vitamin D deficiency   • Monoclonal gammopathy present on serum protein electrophoresis   • Mixed hyperlipidemia   • 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)      Past Medical History:   Diagnosis Date   • Acute bronchospasm 09/28/2018    Cough due to bronchospasm   • Candidal stomatitis 05/28/2018    Thrush   • Contusion of right front wall of thorax, initial encounter 03/25/2021    Rib contusion, right, initial encounter   • Cramp and spasm 06/18/2021    Muscle cramps   • Encounter for other general examination     Rectal exam   • Other injury of unspecified body region, initial encounter 08/07/2015    Musculoskeletal strain   • Other specified disorders of left ear 01/03/2020    Sensation of fullness in left ear   • Other specified disorders of temporomandibular joint 01/29/2020    TMJ inflammation   • Other specified symptoms and signs involving the  circulatory and respiratory systems 09/28/2018    Chest sounds abnormal on percussion or auscultation   • Otitis media, unspecified, left ear 10/04/2019    Acute left otitis media   • Personal history of other diseases of male genital organs     History of erectile dysfunction   • Personal history of other diseases of the nervous system and sense organs 02/10/2017    History of tinnitus   • Personal history of other diseases of the respiratory system 09/28/2018    History of acute sinusitis   • Personal history of other diseases of the respiratory system 10/04/2019    History of acute sinusitis   • Personal history of other diseases of the respiratory system 12/19/2019    History of paranasal sinus congestion   • Personal history of other specified conditions     History of pituitary neoplasm   • Personal history of other specified conditions 02/16/2021    History of epistaxis   • Personal history of other specified conditions 11/28/2019    History of facial pain   • Personal history of other specified conditions 11/28/2019    History of postnasal drip   • Personal history of other specified conditions 12/19/2019    History of persistent cough   • Personal history of other specified conditions 09/28/2018    History of persistent cough   • Personal history of other specified conditions 09/28/2018    History of hoarseness   • Personal history of other specified conditions 09/28/2018    History of vertigo   • Spinal stenosis, lumbosacral region 10/07/2021    Lumbosacral spinal stenosis   • Tinnitus, left ear 01/29/2020    Tinnitus of left ear   • Unspecified injury of thorax, initial encounter 03/25/2021    Rib injury      Past Surgical History:   Procedure Laterality Date   • OTHER SURGICAL HISTORY  03/13/2018    Excision Of Pituitary Gland Partial   • OTHER SURGICAL HISTORY  11/27/2019    Sinus surgery   • TONSILLECTOMY  03/13/2018    Tonsillectomy   • TOTAL KNEE ARTHROPLASTY  03/13/2018    Knee Replacement     "  Social History     Socioeconomic History   • Marital status:      Spouse name: Not on file   • Number of children: Not on file   • Years of education: Not on file   • Highest education level: Not on file   Occupational History   • Not on file   Tobacco Use   • Smoking status: Never   • Smokeless tobacco: Never   Substance and Sexual Activity   • Alcohol use: Yes     Comment: RARELY   • Drug use: Never   • Sexual activity: Not on file   Other Topics Concern   • Not on file   Social History Narrative   • Not on file     Social Drivers of Health     Financial Resource Strain: Not on file   Food Insecurity: Not on file   Transportation Needs: Not on file   Physical Activity: Not on file   Stress: Not on file   Social Connections: Not on file   Intimate Partner Violence: Not on file   Housing Stability: Not on file      No family history on file.   Patient Health Questionnaire-2 Score: 0          Review of Systems  All other system have been reviewed and are negative for complaint.    Vitals:    12/16/24 0810   BP: 151/89   BP Location: Left arm   Patient Position: Sitting   Pulse: 81   Weight: 76.2 kg (168 lb)   Height: 1.702 m (5' 7\")     Objective  Neurological Exam  GENERAL: Resting comfortably, no acute distress    MENTAL STATE: Orientation was normal to person, place, situaiton, date.     Cranial nerves: extra ocular movements are intact, no nystagmus.     COORDINATION: Coordination exam was normal. In both upper extremities, finger-nose-finger was intact without dysmetria or overshoot. In both lower extremities, finger bob were normal. LAZARA intact bilaterally.    Gait: walks with a cane, slight slapping of feet when ambulates, narrow based, good stride length     Movement specific:  Mild bradykinesia w finger taps but no other decrements.   Mild no-no head tremor       [place] Tremor Rating scale:   PART A Face at rest: 0, Face at posture: 0, Face Total: 0, Tongue at rest: 0, Tongue at posture: " 1, Tongue total: 1, Voice in action: 1, Voice total: 1, Head at rest: 0, Head at posture: 2, Head total: 2, RUE at rest: 0, RUE at posture: 1, RUE in action: 1, RUE total: 2, LUE at rest: 0, LUE at posture: 1, LUE in action: 1, LUE total: 2, Trunk at rest: 0, Trunk at posture: 0, Trunk total: 0, RLE at rest: 0, RLE at posture: 0, RLE in action: 0, RLE total: 0, LLE at rest: 0, LLE at posture: 0, LLE in action: 0, LLE total: 0, Part A subtotal: 8 PART B Handwriting dominant: 1, Drawing A - Right: 0, Drawing A - Left: 0, Drawing A total: 0, Drawing B - Right: 1, Drawing B - Left: 1, Drawing B total: 2, Drawing C - Right: 0, Drawing C - Left: 0, Pourin, Drawing C total: 0, Part B subtotal: 3            Assessment/Plan  Diagnoses and all orders for this visit:  Orthostatic hypotension  Gait instability  -     Referral to Physical Therapy; Future  Neuropathy  -     Referral to Physical Therapy; Future  Essential tremor  -     Referral to ENT; Future  Tinnitus of both ears  -     Referral to ENT; Future  Dizziness  -     Referral to ENT; Future  -     Referral to Physical Therapy; Future        Martinez Potter is a 74 y.o. year old male here for follow up of tremor.  His tremor has responded excellently to primidone, so not an issue currently. Has longstanding balance issues, however has recently worsened and now needs to use a cane. Weaning off the primidone did not help his gait disorder. Work up has found neuropathy, lab work up completed and EMG/NCS is pending.   Also has MRI brain pending. On history also has tinnitus and hearing loss, so Meniere's disease also on differential here, does not describe vertigo, but certainly a sensation of  movement.   I will also initiate a vestibular therapy referral. Suspect balance disorder is multifactorial - related to OH, neuropathy  and possibly also essential tremor. No cerebellar signs on exam, but MRI brain is pending.     Plan:    Continue Primidone 100 mg at bedtime.      I agree with doing the EMG and nerve conduction study as well as the MRI brain that are both pending.     Referral to ENT for tinnitus, ? Meniere's  Continue to follow w cardiology for orthostatic hypotension.   Vestibular rehab therapy  RTC in 6 months.     Life style changes for management of orthostatic hypotension (OH):    1. Increase water intake.  People with OH need more water and should be drinking 3 quarts per day (~2.5 liters). Ideally, the best approach is to drink water and supplement it with salt . Diet (sugar-free) beverages are also acceptable. Tea and coffee increase urine output, so they may worsen her symptoms. Sports drinks, juices and non-diet beverages are not recommended, due to their high-sugar content     2. Increase salt intake if cleared from a cardiac perspective (check with your PCP or cardiologist before making this change).    3. Try wearing compression stockings and an abdominal binder (particularly during mealtimes).    4.. Sleep with the head of the bed raised 30 degrees.    5. Try drinking 16 oz of cold water 30 minutes prior to getting out of bed in the morning as this will help to increase the blood pressure.     6 Physical/water exercise  It Is crucial to keep muscles active and for overall well-being. It includes:   - Recumbent exercises (e.g., recumbent bicycle, elastic bands, rowing machine, etc.).   - Water exercises. The hydrostatic pressure of the water will prevent the blood pressure from falling dramatically.   - Yoga and rafia chi     7. Avoid factors which decrease blood pressure and worsen OH:  - Hot and humid temperatures  - Physical exercises which cause blood pressure drop Dehydration (Prolonged standing and walking) .   - Alcohol  - High-glycemic index carbohydrates and heavy meals. It is advised to try eating several small meals (5-6) instead of the three traditional meals a day.

## 2025-01-06 ENCOUNTER — TELEPHONE (OUTPATIENT)
Dept: NEUROLOGY | Facility: CLINIC | Age: 75
End: 2025-01-06

## 2025-01-06 DIAGNOSIS — G25.0 ESSENTIAL TREMOR: ICD-10-CM

## 2025-01-06 RX ORDER — PRIMIDONE 50 MG/1
100 TABLET ORAL NIGHTLY
Qty: 180 TABLET | Refills: 3 | Status: SHIPPED | OUTPATIENT
Start: 2025-01-06 | End: 2026-01-06

## 2025-01-06 NOTE — PROGRESS NOTES
"Rheumatology Outpatient Follow up Note    Subjective   Martinez Potter \"Steven\" is a 74 y.o. male presenting today for Joint Pain.    History of Presenting Problem:   Rheum history: diagnosed with RA ~ early 2024. It started with elbow bubbles (?rheumatoid nodules), he also had wrist pain (more around 1st [facility]). He had right knee replacement in 2005. He also had chronic low back pain.   No significant pain in MCPs or PIPs. He has right big toe pain and stiffness.   His serologies were negative.  He was diagnosed with seronegative RA    He was started on HCQ/MTX and it helped reduce the ?elbow bubbles. Didn't help much with wrist pain.     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 Its limiting his  strength. He also has polyarthralgia.         Past Medical History:   Past Medical History:   Diagnosis Date    Acute bronchospasm 09/28/2018    Cough due to bronchospasm    Candidal stomatitis 05/28/2018    Thrush    Contusion of right front wall of thorax, initial encounter 03/25/2021    Rib contusion, right, initial encounter    Cramp and spasm 06/18/2021    Muscle cramps    Encounter for other general examination     Rectal exam    Other injury of unspecified body region, initial encounter 08/07/2015    Musculoskeletal strain    Other specified disorders of left ear 01/03/2020    Sensation of fullness in left ear    Other specified disorders of temporomandibular joint 01/29/2020    TMJ inflammation    Other specified symptoms and signs involving the circulatory and respiratory systems 09/28/2018    Chest sounds abnormal on percussion or auscultation    Otitis media, unspecified, left ear 10/04/2019    Acute left otitis media    Personal history of other " diseases of male genital organs     History of erectile dysfunction    Personal history of other diseases of the nervous system and sense organs 02/10/2017    History of tinnitus    Personal history of other diseases of the respiratory system 09/28/2018    History of acute sinusitis    Personal history of other diseases of the respiratory system 10/04/2019    History of acute sinusitis    Personal history of other diseases of the respiratory system 12/19/2019    History of paranasal sinus congestion    Personal history of other specified conditions     History of pituitary neoplasm    Personal history of other specified conditions 02/16/2021    History of epistaxis    Personal history of other specified conditions 11/28/2019    History of facial pain    Personal history of other specified conditions 11/28/2019    History of postnasal drip    Personal history of other specified conditions 12/19/2019    History of persistent cough    Personal history of other specified conditions 09/28/2018    History of persistent cough    Personal history of other specified conditions 09/28/2018    History of hoarseness    Personal history of other specified conditions 09/28/2018    History of vertigo    Spinal stenosis, lumbosacral region 10/07/2021    Lumbosacral spinal stenosis    Tinnitus, left ear 01/29/2020    Tinnitus of left ear    Unspecified injury of thorax, initial encounter 03/25/2021    Rib injury       Allergies:   Allergies   Allergen Reactions    Pollen Extracts Other       Medications:   Current Outpatient Medications:     albuterol 90 mcg/actuation inhaler, Inhale 2 puffs., Disp: , Rfl:     amLODIPine (Norvasc) 2.5 mg tablet, Take 1 tablet (2.5 mg) by mouth once daily., Disp: , Rfl:     Arexvy, PF, 120 mcg/0.5 mL suspension for reconstitution, , Disp: , Rfl:     budesonide-formoteroL (Symbicort) 160-4.5 mcg/actuation inhaler, Inhale 2 puffs twice a day., Disp: , Rfl:     desmopressin (DDAVP) 0.1 mg tablet, Take 2  "tablets (0.2 mg) by mouth 2 times a day., Disp: 360 tablet, Rfl: 3    fluticasone (Flonase Allergy Relief) 50 mcg/actuation nasal spray, Administer 2 sprays into each nostril once daily., Disp: , Rfl:     folic acid (Folvite) 1 mg tablet, Take 1 tablet (1 mg) by mouth once daily., Disp: 30 tablet, Rfl: 2    hydrocortisone (Cortef) 10 mg tablet, Take 1 tablet (10 mg) by mouth once daily in the morning. Take before meals., Disp: 90 tablet, Rfl: 3    hydrocortisone (Cortef) 5 mg tablet, TAKE 1 & 1/2 TABLETS AROUND 1PM AND 1/2 TABLET IN THE AFTERNOON, Disp: 360 tablet, Rfl: 3    hydrocortisone (Cortef) 5 mg tablet, TAKE 1 & 1/2 TABLETS AROUND 1PM AND 1/2 TABLET IN THE AFTERNOON, Disp: 360 tablet, Rfl: 3    hydroxychloroquine (Plaquenil) 200 mg tablet, Take 2 tablets (400 mg) by mouth once daily., Disp: 60 tablet, Rfl: 2    insulin syringe-needle U-100 1 mL 28 gauge x 1/2\" syringe, AS DIRECTED 30 DAYS, Disp: , Rfl:     levothyroxine (Synthroid, Levoxyl) 150 mcg tablet, Take 1 tablet (150 mcg) by mouth once daily in the morning. Take before meals., Disp: 90 tablet, Rfl: 3    methotrexate, PF, (Rasuvo, PF,) 17.5 mg/0.35 mL auto-injector, Inject 17.5 mg under the skin 1 (one) time per week., Disp: 4 each, Rfl: 2    montelukast (Singulair) 10 mg tablet, Take 1 tablet (10 mg) by mouth once daily at bedtime., Disp: , Rfl:     multivitamin with minerals iron-free (Multivitamin 50 Plus), Take 1 tablet by mouth once daily., Disp: , Rfl:     primidone (Mysoline) 50 mg tablet, Take 2 tablets (100 mg) by mouth once daily at bedtime., Disp: 180 tablet, Rfl: 3    rosuvastatin (Crestor) 10 mg tablet, Take 1 tablet (10 mg) by mouth early in the morning.., Disp: , Rfl:     sulfaSALAzine (Azulfidine) 500 mg tablet, , Disp: , Rfl:     syringe with needle 3 mL 22 x 1 1/2\" syringe, Use as directed every 2 weeks., Disp: 20 each, Rfl: 3    testosterone cypionate (Depo-Testosterone) 200 mg/mL injection, Inject 0.5 mL (100 mg) into the muscle " "every 14 (fourteen) days., Disp: 2 mL, Rfl: 3    traMADol (Ultram) 50 mg tablet, Take 1 tablet (50 mg) by mouth every 8 hours if needed., Disp: , Rfl:     Review of Systems:   Constitutional: Denies fever, chills   Eyes: Denies dry eyes, pain in the eyes   ENT: Denies dry mouth, loss of taste, sores in the mouth  Cardiovascular: Denies chest pain, palpitations   Respiratory: Denies shortness of breath   Gastrointestinal: Denies heartburn   Integumentary: Denies photosensitivity, rash or lesions, Raynaud's   Neurological: Denies any numbness or tingling    MSK: As per HPI.     All 10 review of systems have been reviewed and are negative for complaint except as noted in the HPI    Objective   Physical Examination:  There were no vitals filed for this visit.    Growth %ile SmartLinks can only be used for patients less than 20 years old.  Ht Readings from Last 1 Encounters:   12/16/24 1.702 m (5' 7\")     Wt Readings from Last 1 Encounters:   12/16/24 76.2 kg (168 lb)       General - NAD, sitting up in chair, well-groomed, pleasant, AAOx3  Head: Normocephalic, atraumatic  Eyes - PERRLA, EOMI. No conjunctiva injection.   Cardiovascular - Normal S1, S2. Regular rate and rhythm. No murmurs or rubs.  Lungs - Symmetric chest expansion. Clear to auscultation bilaterally.   Skin - No rashes or ulcers. Skin warm and dry. No erythema on bilateral cheeks.  Extremities - No edema, cyanosis ,or clubbing  Neurological - Alert and oriented x 3,  grossly intact. No focal deficit.  Musculoskeletal -  Shoulders: Full ROM, without pain, no swelling, warmth or tenderness.  Elbows: Full ROM, without pain, no swelling, warmth or tenderness.  Wrists: right wrist synovial thickening. Limited extension/flexion.   MCP: No swelling, warmth or tenderness. Metacarpal squeeze negative  PIP: No swelling, warmth or tenderness.  DIP: No swelling, warmth or tenderness. OA changes  Hands : 5/5.    Sacroiliac joints: No local tenderness. RAMANDEEP " "negative.   Hips: Full ROM.  No malalignment.  Knees:  Full ROM, without pain, no swelling, warmth or point tenderness.   Ankles: Full ROM, without pain, swelling, warmth or tenderness.  Toes:  Metatarsal squeeze positive (right)  Cervical spine: No tenderness or limitation of movement  Lumbar spine: No tenderness or limitation of movement        Laboratory Testing:  10/2024: CMP with low calcium 8.3, CBC normal,     Imaging:  MRI Right wrist 9/2024:  IMPRESSION:  1. Erosive changes of the scaphoid, trapezoid, capitate, and  triquetrum with prominent cystic changes likely from inflammatory  arthropathy.  2. Severe osteoarthrosis of the radioscaphoid articulation and  intercarpal joints. Mild osteoarthrosis of the carpometacarpal joints.  3. Moderate joint effusion of the intercarpal and distal radioulnar  joints.    Assessment/Plan   Martinez Potter \"tSeven\" is a 74 y.o. male with PMHx of HTN, SNHL, GERD, vitamin D deficiency, Lumbar spondylosis, hypopituitarism, possible multiple myeloma, Asthma, and seronegative RA who is presenting today as a follow up for RA    ##? Rheumatoid arthritis vs erosive OA:  -Manifested by possible elbow nodules (reported but now resolved), right wrist pain and swelling episodes, polyarthralgia. MRI of right wrist showing erosions. RF-, CCP-. GAGAN-  -Currently stable on MTX subq and HCQ  -Hand/feet xray's 11/2024: OA changes  -MRI of right wrist reviewed as above and consistent with inflammatory arthritis  -reviewed labs 11/2024: ESR/CRP/CBC/CMP normal   -His 1st [facility] pain is likely 2/2 OA and can consider cortisone injections at next visit: will use Voltaren cream and thumb brace for now   -Encouraged to increase exercise and physical activity  -Advised to improve cardiovascular risk factors (smoking, BP, diabetes, lipids..etc)      ##Medical management of high risk medication:  -Drug name: MTX subq  -Tolerating injection site and no side effects  -Labs reviewed   -Most recent TB/hepatitis " test: negative in 11/2024  -Patient is advised to hold therapy if they experience any signs of infection requiring antibiotic therapy, and to resume after full recovery and conclusion of antibiotic course. Patient is advised to contact provider in this case.  -Educated the patient about risks and benefits of such therapy, and that the benefits of this therapy outweigh the risks and thus we will continue.    ##HCQ long term use:  -Educated the patient about the potential side effects of using antimalarials. Serious side effects are extremely rare. However, the fear of vision-threatening toxic retinopathy remains a major concern, this can be avoided when using the appropriate doses along with routine ocular monitoring.  -Current dose is 400 mg daily (<5mg/kg/day)  -Last eye exam: 5/2024      The assessment and plan, risk and benefits were discussed with the patient. All of the patients questions were answered and patient agrees to the plan.      Melanie Perez MD  Clinical   Department of Rheumatology   [facility]

## 2025-01-07 ENCOUNTER — APPOINTMENT (OUTPATIENT)
Facility: CLINIC | Age: 75
End: 2025-01-07
Payer: MEDICARE

## 2025-01-07 VITALS
BODY MASS INDEX: 28.06 KG/M2 | HEIGHT: 67 IN | DIASTOLIC BLOOD PRESSURE: 90 MMHG | WEIGHT: 178.8 LBS | SYSTOLIC BLOOD PRESSURE: 146 MMHG | TEMPERATURE: 97.7 F | HEART RATE: 65 BPM

## 2025-01-07 DIAGNOSIS — M18.0 OSTEOARTHRITIS OF CARPOMETACARPAL (CMC) JOINTS OF BOTH THUMBS, UNSPECIFIED OSTEOARTHRITIS TYPE: ICD-10-CM

## 2025-01-07 DIAGNOSIS — M19.90 INFLAMMATORY ARTHRITIS: Primary | ICD-10-CM

## 2025-01-07 DIAGNOSIS — M06.00 RHEUMATOID ARTHRITIS WITH NEGATIVE RHEUMATOID FACTOR, INVOLVING UNSPECIFIED SITE (MULTI): ICD-10-CM

## 2025-01-07 DIAGNOSIS — Z79.899 HIGH RISK MEDICATION USE: ICD-10-CM

## 2025-01-07 DIAGNOSIS — E29.1 HYPOGONADISM MALE: ICD-10-CM

## 2025-01-07 PROCEDURE — 99215 OFFICE O/P EST HI 40 MIN: CPT | Performed by: STUDENT IN AN ORGANIZED HEALTH CARE EDUCATION/TRAINING PROGRAM

## 2025-01-07 PROCEDURE — 1160F RVW MEDS BY RX/DR IN RCRD: CPT | Performed by: STUDENT IN AN ORGANIZED HEALTH CARE EDUCATION/TRAINING PROGRAM

## 2025-01-07 PROCEDURE — 3008F BODY MASS INDEX DOCD: CPT | Performed by: STUDENT IN AN ORGANIZED HEALTH CARE EDUCATION/TRAINING PROGRAM

## 2025-01-07 PROCEDURE — 3080F DIAST BP >= 90 MM HG: CPT | Performed by: STUDENT IN AN ORGANIZED HEALTH CARE EDUCATION/TRAINING PROGRAM

## 2025-01-07 PROCEDURE — G2211 COMPLEX E/M VISIT ADD ON: HCPCS | Performed by: STUDENT IN AN ORGANIZED HEALTH CARE EDUCATION/TRAINING PROGRAM

## 2025-01-07 PROCEDURE — 1036F TOBACCO NON-USER: CPT | Performed by: STUDENT IN AN ORGANIZED HEALTH CARE EDUCATION/TRAINING PROGRAM

## 2025-01-07 PROCEDURE — 1159F MED LIST DOCD IN RCRD: CPT | Performed by: STUDENT IN AN ORGANIZED HEALTH CARE EDUCATION/TRAINING PROGRAM

## 2025-01-07 PROCEDURE — 3077F SYST BP >= 140 MM HG: CPT | Performed by: STUDENT IN AN ORGANIZED HEALTH CARE EDUCATION/TRAINING PROGRAM

## 2025-01-07 RX ORDER — SYRINGE-NEEDLE,INSULIN,0.5 ML 27GX1/2"
SYRINGE, EMPTY DISPOSABLE MISCELLANEOUS
Qty: 10 EACH | Refills: 1 | Status: SHIPPED | OUTPATIENT
Start: 2025-01-07

## 2025-01-07 RX ORDER — METHOTREXATE 17.5 MG/.35ML
17.5 INJECTION, SOLUTION SUBCUTANEOUS WEEKLY
Qty: 4 EACH | Refills: 2 | Status: SHIPPED | OUTPATIENT
Start: 2025-01-07 | End: 2025-01-07 | Stop reason: ALTCHOICE

## 2025-01-07 RX ORDER — METHOTREXATE 25 MG/ML
INJECTION, SOLUTION INTRAMUSCULAR; INTRATHECAL; INTRAVENOUS; SUBCUTANEOUS
Qty: 4 ML | Refills: 3 | Status: SHIPPED | OUTPATIENT
Start: 2025-01-07 | End: 2025-01-07 | Stop reason: SDUPTHER

## 2025-01-07 RX ORDER — FOLIC ACID 1 MG/1
1 TABLET ORAL DAILY
Qty: 30 TABLET | Refills: 2 | Status: SHIPPED | OUTPATIENT
Start: 2025-01-07

## 2025-01-07 RX ORDER — DICLOFENAC SODIUM 10 MG/G
4 GEL TOPICAL 4 TIMES DAILY PRN
Qty: 100 G | Refills: 1 | Status: SHIPPED | OUTPATIENT
Start: 2025-01-07

## 2025-01-07 RX ORDER — HYDROXYCHLOROQUINE SULFATE 200 MG/1
400 TABLET, FILM COATED ORAL DAILY
Qty: 60 TABLET | Refills: 2 | Status: SHIPPED | OUTPATIENT
Start: 2025-01-07

## 2025-01-07 RX ORDER — METHOTREXATE 25 MG/ML
INJECTION, SOLUTION INTRAMUSCULAR; INTRATHECAL; INTRAVENOUS; SUBCUTANEOUS
Qty: 4 ML | Refills: 3 | Status: SHIPPED | OUTPATIENT
Start: 2025-01-07

## 2025-01-13 DIAGNOSIS — E29.1 HYPOGONADISM MALE: ICD-10-CM

## 2025-01-13 DIAGNOSIS — E23.0 HYPOPITUITARISM (MULTI): ICD-10-CM

## 2025-01-13 RX ORDER — TESTOSTERONE CYPIONATE 200 MG/ML
100 INJECTION, SOLUTION INTRAMUSCULAR
Qty: 2 ML | Refills: 3 | Status: SHIPPED | OUTPATIENT
Start: 2025-01-13 | End: 2025-08-25

## 2025-01-24 ENCOUNTER — HOSPITAL ENCOUNTER (OUTPATIENT)
Dept: NEUROLOGY | Facility: HOSPITAL | Age: 75
Discharge: HOME | End: 2025-01-24
Payer: MEDICARE

## 2025-01-24 DIAGNOSIS — G62.9 NEUROPATHY: ICD-10-CM

## 2025-02-05 NOTE — TELEPHONE ENCOUNTER
Patient called on this again ans stated that he is going to run out by Monday.  Please sign pended script if you agree

## 2025-02-06 ENCOUNTER — APPOINTMENT (OUTPATIENT)
Facility: CLINIC | Age: 75
End: 2025-02-06
Payer: MEDICARE

## 2025-02-06 VITALS
WEIGHT: 179 LBS | HEIGHT: 67 IN | BODY MASS INDEX: 28.09 KG/M2 | TEMPERATURE: 97.3 F | DIASTOLIC BLOOD PRESSURE: 96 MMHG | SYSTOLIC BLOOD PRESSURE: 168 MMHG | HEART RATE: 61 BPM

## 2025-02-06 DIAGNOSIS — M18.0 OSTEOARTHRITIS OF CARPOMETACARPAL (CMC) JOINTS OF BOTH THUMBS, UNSPECIFIED OSTEOARTHRITIS TYPE: Primary | ICD-10-CM

## 2025-02-06 DIAGNOSIS — M19.90 INFLAMMATORY ARTHRITIS: ICD-10-CM

## 2025-02-06 PROCEDURE — 1036F TOBACCO NON-USER: CPT | Performed by: STUDENT IN AN ORGANIZED HEALTH CARE EDUCATION/TRAINING PROGRAM

## 2025-02-06 PROCEDURE — 20600 DRAIN/INJ JOINT/BURSA W/O US: CPT | Performed by: STUDENT IN AN ORGANIZED HEALTH CARE EDUCATION/TRAINING PROGRAM

## 2025-02-06 PROCEDURE — 3008F BODY MASS INDEX DOCD: CPT | Performed by: STUDENT IN AN ORGANIZED HEALTH CARE EDUCATION/TRAINING PROGRAM

## 2025-02-06 PROCEDURE — 3080F DIAST BP >= 90 MM HG: CPT | Performed by: STUDENT IN AN ORGANIZED HEALTH CARE EDUCATION/TRAINING PROGRAM

## 2025-02-06 PROCEDURE — 3077F SYST BP >= 140 MM HG: CPT | Performed by: STUDENT IN AN ORGANIZED HEALTH CARE EDUCATION/TRAINING PROGRAM

## 2025-02-06 PROCEDURE — 99214 OFFICE O/P EST MOD 30 MIN: CPT | Performed by: STUDENT IN AN ORGANIZED HEALTH CARE EDUCATION/TRAINING PROGRAM

## 2025-02-06 PROCEDURE — G2211 COMPLEX E/M VISIT ADD ON: HCPCS | Performed by: STUDENT IN AN ORGANIZED HEALTH CARE EDUCATION/TRAINING PROGRAM

## 2025-02-06 PROCEDURE — 1159F MED LIST DOCD IN RCRD: CPT | Performed by: STUDENT IN AN ORGANIZED HEALTH CARE EDUCATION/TRAINING PROGRAM

## 2025-02-06 PROCEDURE — 1160F RVW MEDS BY RX/DR IN RCRD: CPT | Performed by: STUDENT IN AN ORGANIZED HEALTH CARE EDUCATION/TRAINING PROGRAM

## 2025-02-06 RX ORDER — FOLIC ACID 1 MG/1
1 TABLET ORAL DAILY
Qty: 30 TABLET | Refills: 2 | Status: SHIPPED | OUTPATIENT
Start: 2025-02-06

## 2025-02-06 RX ORDER — LIDOCAINE HYDROCHLORIDE 10 MG/ML
0.5 INJECTION, SOLUTION INFILTRATION; PERINEURAL
Status: COMPLETED | OUTPATIENT
Start: 2025-02-06 | End: 2025-02-06

## 2025-02-06 RX ORDER — METHYLPREDNISOLONE ACETATE 40 MG/ML
20 INJECTION, SUSPENSION INTRA-ARTICULAR; INTRALESIONAL; INTRAMUSCULAR; SOFT TISSUE
Status: COMPLETED | OUTPATIENT
Start: 2025-02-06 | End: 2025-02-06

## 2025-02-06 RX ADMIN — LIDOCAINE HYDROCHLORIDE 0.5 ML: 10 INJECTION, SOLUTION INFILTRATION; PERINEURAL at 14:23

## 2025-02-06 RX ADMIN — METHYLPREDNISOLONE ACETATE 20 MG: 40 INJECTION, SUSPENSION INTRA-ARTICULAR; INTRALESIONAL; INTRAMUSCULAR; SOFT TISSUE at 14:23

## 2025-02-06 NOTE — PROGRESS NOTES
"Rheumatology Outpatient Follow up Note    Subjective   Martinez Potter \"Steven\" is a 74 y.o. male presenting today for Joint Pain.    History of Presenting Problem:   Rheum history: diagnosed with RA ~ early 2024. It started with elbow bubbles (?rheumatoid nodules), he also had wrist pain (more around 1st [facility]). He had right knee replacement in 2005. He also had chronic low back pain.   No significant pain in MCPs or PIPs. He has right big toe pain and stiffness.   His serologies were negative.  He was diagnosed with seronegative RA    He was started on HCQ/MTX and it helped reduce the ?elbow bubbles. Didn't help much with wrist pain.     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 Its limiting his  strength. He also has polyarthralgia.         Past Medical History:   Past Medical History:   Diagnosis Date    Acute bronchospasm 09/28/2018    Cough due to bronchospasm    Candidal stomatitis 05/28/2018    Thrush    Contusion of right front wall of thorax, initial encounter 03/25/2021    Rib contusion, right, initial encounter    Cramp and spasm 06/18/2021    Muscle cramps    Encounter for other general examination     Rectal exam    Other injury of unspecified body region, initial encounter 08/07/2015    Musculoskeletal strain    Other specified disorders of left ear 01/03/2020    Sensation of fullness in left ear    Other specified disorders of temporomandibular joint 01/29/2020    TMJ inflammation    Other specified symptoms and signs involving the circulatory and respiratory systems 09/28/2018    Chest sounds abnormal on percussion or auscultation    Otitis media, unspecified, left ear 10/04/2019    Acute left otitis media    Personal history of other " diseases of male genital organs     History of erectile dysfunction    Personal history of other diseases of the nervous system and sense organs 02/10/2017    History of tinnitus    Personal history of other diseases of the respiratory system 09/28/2018    History of acute sinusitis    Personal history of other diseases of the respiratory system 10/04/2019    History of acute sinusitis    Personal history of other diseases of the respiratory system 12/19/2019    History of paranasal sinus congestion    Personal history of other specified conditions     History of pituitary neoplasm    Personal history of other specified conditions 02/16/2021    History of epistaxis    Personal history of other specified conditions 11/28/2019    History of facial pain    Personal history of other specified conditions 11/28/2019    History of postnasal drip    Personal history of other specified conditions 12/19/2019    History of persistent cough    Personal history of other specified conditions 09/28/2018    History of persistent cough    Personal history of other specified conditions 09/28/2018    History of hoarseness    Personal history of other specified conditions 09/28/2018    History of vertigo    Spinal stenosis, lumbosacral region 10/07/2021    Lumbosacral spinal stenosis    Tinnitus, left ear 01/29/2020    Tinnitus of left ear    Unspecified injury of thorax, initial encounter 03/25/2021    Rib injury       Allergies:   Allergies   Allergen Reactions    Pollen Extracts Other       Medications:   Current Outpatient Medications:     albuterol 90 mcg/actuation inhaler, Inhale 2 puffs., Disp: , Rfl:     amLODIPine (Norvasc) 2.5 mg tablet, Take 1 tablet (2.5 mg) by mouth once daily., Disp: , Rfl:     Arexvy, PF, 120 mcg/0.5 mL suspension for reconstitution, , Disp: , Rfl:     budesonide-formoteroL (Symbicort) 160-4.5 mcg/actuation inhaler, Inhale 2 puffs twice a day., Disp: , Rfl:     desmopressin (DDAVP) 0.1 mg tablet, Take 2  "tablets (0.2 mg) by mouth 2 times a day., Disp: 360 tablet, Rfl: 3    diclofenac sodium (Voltaren) 1 % gel, Apply 4.5 inches (4 g) topically 4 times a day as needed (thumb pain)., Disp: 100 g, Rfl: 1    fluticasone (Flonase Allergy Relief) 50 mcg/actuation nasal spray, Administer 2 sprays into each nostril once daily., Disp: , Rfl:     folic acid (Folvite) 1 mg tablet, Take 1 tablet (1 mg) by mouth once daily., Disp: 30 tablet, Rfl: 2    hydrocortisone (Cortef) 10 mg tablet, Take 1 tablet (10 mg) by mouth once daily in the morning. Take before meals., Disp: 90 tablet, Rfl: 3    hydrocortisone (Cortef) 5 mg tablet, TAKE 1 & 1/2 TABLETS AROUND 1PM AND 1/2 TABLET IN THE AFTERNOON, Disp: 360 tablet, Rfl: 3    hydrocortisone (Cortef) 5 mg tablet, TAKE 1 & 1/2 TABLETS AROUND 1PM AND 1/2 TABLET IN THE AFTERNOON, Disp: 360 tablet, Rfl: 3    hydroxychloroquine (Plaquenil) 200 mg tablet, Take 2 tablets (400 mg) by mouth once daily., Disp: 60 tablet, Rfl: 2    insulin syringe-needle U-100 1 mL 28 gauge x 1/2\" syringe, Use as directed every 1 week., Disp: 10 each, Rfl: 1    levothyroxine (Synthroid, Levoxyl) 150 mcg tablet, Take 1 tablet (150 mcg) by mouth once daily in the morning. Take before meals., Disp: 90 tablet, Rfl: 3    methotrexate 25 mg/mL injection, Inject 0.7mL (17.5mg) under the skin once weekly, Disp: 4 mL, Rfl: 3    montelukast (Singulair) 10 mg tablet, Take 1 tablet (10 mg) by mouth once daily at bedtime., Disp: , Rfl:     multivitamin with minerals iron-free (Multivitamin 50 Plus), Take 1 tablet by mouth once daily., Disp: , Rfl:     primidone (Mysoline) 50 mg tablet, Take 2 tablets (100 mg) by mouth once daily at bedtime., Disp: 180 tablet, Rfl: 3    rosuvastatin (Crestor) 10 mg tablet, Take 1 tablet (10 mg) by mouth early in the morning.., Disp: , Rfl:     syringe with needle 3 mL 22 x 1 1/2\" syringe, Use as directed every 2 weeks., Disp: 20 each, Rfl: 3    testosterone cypionate (Depo-Testosterone) 200 " "mg/mL injection, Inject 0.5 mL (100 mg) into the muscle every 14 (fourteen) days., Disp: 2 mL, Rfl: 3    traMADol (Ultram) 50 mg tablet, Take 1 tablet (50 mg) by mouth every 8 hours if needed., Disp: , Rfl:     Review of Systems:   Constitutional: Denies fever, chills   Eyes: Denies dry eyes, pain in the eyes   ENT: Denies dry mouth, loss of taste, sores in the mouth  Cardiovascular: Denies chest pain, palpitations   Respiratory: Denies shortness of breath   Gastrointestinal: Denies heartburn   Integumentary: Denies photosensitivity, rash or lesions, Raynaud's   Neurological: Denies any numbness or tingling    MSK: As per HPI.     All 10 review of systems have been reviewed and are negative for complaint except as noted in the HPI    Objective   Physical Examination:  Vitals:    02/06/25 1421   BP: (!) 168/96   Pulse: 61   Temp: 36.3 °C (97.3 °F)       Growth %ile SmartLinks can only be used for patients less than 20 years old.  Ht Readings from Last 1 Encounters:   02/06/25 1.702 m (5' 7\")     Wt Readings from Last 1 Encounters:   02/06/25 81.2 kg (179 lb)       General - NAD, sitting up in chair, well-groomed, pleasant, AAOx3  Head: Normocephalic, atraumatic  Eyes - PERRLA, EOMI. No conjunctiva injection.   Cardiovascular - Normal S1, S2. Regular rate and rhythm. No murmurs or rubs.  Lungs - Symmetric chest expansion. Clear to auscultation bilaterally.   Skin - No rashes or ulcers. Skin warm and dry. No erythema on bilateral cheeks.  Extremities - No edema, cyanosis ,or clubbing  Neurological - Alert and oriented x 3,  grossly intact. No focal deficit.  Musculoskeletal -  Shoulders: Full ROM, without pain, no swelling, warmth or tenderness.  Elbows: Full ROM, without pain, no swelling, warmth or tenderness.  Wrists: right wrist synovial thickening. Limited extension/flexion.   MCP: No swelling, warmth or tenderness. Metacarpal squeeze negative  PIP: No swelling, warmth or tenderness.  DIP: No swelling, warmth or " "tenderness. OA changes  Hands : 5/5.    Sacroiliac joints: No local tenderness. RAMANDEEP negative.   Hips: Full ROM.  No malalignment.  Knees:  Full ROM, without pain, no swelling, warmth or point tenderness.   Ankles: Full ROM, without pain, swelling, warmth or tenderness.  Toes:  Metatarsal squeeze positive (right)  Cervical spine: No tenderness or limitation of movement  Lumbar spine: No tenderness or limitation of movement        Laboratory Testing:  10/2024: CMP with low calcium 8.3, CBC normal,     Imaging:  MRI Right wrist 9/2024:  IMPRESSION:  1. Erosive changes of the scaphoid, trapezoid, capitate, and  triquetrum with prominent cystic changes likely from inflammatory  arthropathy.  2. Severe osteoarthrosis of the radioscaphoid articulation and  intercarpal joints. Mild osteoarthrosis of the carpometacarpal joints.  3. Moderate joint effusion of the intercarpal and distal radioulnar  joints.    Assessment/Plan   Martinez Potter \"Steven\" is a 74 y.o. male with PMHx of HTN, SNHL, GERD, vitamin D deficiency, Lumbar spondylosis, hypopituitarism, possible multiple myeloma, Asthma, and seronegative RA who is presenting today as a follow up for RA    ##? Rheumatoid arthritis vs erosive OA:  -Manifested by possible elbow nodules (reported but now resolved), right wrist pain and swelling episodes, polyarthralgia. MRI of right wrist showing erosions. RF-, CCP-. GAGAN-  -Currently stable on MTX subq and HCQ  -Hand/feet xray's 11/2024: OA changes  -MRI of right wrist reviewed as above and consistent with inflammatory arthritis  -Reviewed labs 11/2024: ESR/CRP/CBC/CMP normal   -His 1st [facility] pain is likely 2/2 OA and he received bilateral cortisone injections  today.  Will continue use Voltaren cream and thumb brace.   -Encouraged to increase exercise and physical activity  -Advised to improve cardiovascular risk factors (smoking, BP, diabetes, lipids..etc)      ##Medical management of high risk medication:  -Drug name: MTX " "subq  -Tolerating injection site and no side effects  -Labs reviewed   -Most recent TB/hepatitis test: negative in 11/2024  -Patient is advised to hold therapy if they experience any signs of infection requiring antibiotic therapy, and to resume after full recovery and conclusion of antibiotic course. Patient is advised to contact provider in this case.  -Educated the patient about risks and benefits of such therapy, and that the benefits of this therapy outweigh the risks and thus we will continue.    ##HCQ long term use:  -Educated the patient about the potential side effects of using antimalarials. Serious side effects are extremely rare. However, the fear of vision-threatening toxic retinopathy remains a major concern, this can be avoided when using the appropriate doses along with routine ocular monitoring.  -Current dose is 400 mg daily (<5mg/kg/day)  -Last eye exam: 5/2024      The assessment and plan, risk and benefits were discussed with the patient. All of the patients questions were answered and patient agrees to the plan.      Melanie Perez MD  Clinical   Department of Rheumatology   [facility]      Patient ID: Martinez Potter \"Steven\" is a 74 y.o. male.    Small Joint Injection/Arthrocentesis: bilateral thumb CMC on 2/6/2025 2:23 PM  Indications: pain  Details: 25 G needle, radial approach  Medications (Right): 20 mg methylPREDNISolone acetate 40 mg/mL; 0.5 mL lidocaine 10 mg/mL (1 %)  Medications (Left): 20 mg methylPREDNISolone acetate 40 mg/mL; 0.5 mL lidocaine 10 mg/mL (1 %)  Outcome: tolerated well, no immediate complications  Procedure, treatment alternatives, risks and benefits explained, specific risks discussed. Immediately prior to procedure a time out was called to verify the correct patient, procedure, equipment, support staff and site/side marked as required. Patient was prepped and draped in the usual sterile fashion.           "

## 2025-02-11 ENCOUNTER — APPOINTMENT (OUTPATIENT)
Dept: ENDOCRINOLOGY | Facility: CLINIC | Age: 75
End: 2025-02-11
Payer: MEDICARE

## 2025-02-11 VITALS
WEIGHT: 181 LBS | HEART RATE: 57 BPM | SYSTOLIC BLOOD PRESSURE: 173 MMHG | TEMPERATURE: 97.5 F | DIASTOLIC BLOOD PRESSURE: 94 MMHG | BODY MASS INDEX: 28.41 KG/M2 | HEIGHT: 67 IN

## 2025-02-11 DIAGNOSIS — Z51.81 ENCOUNTER FOR MONITORING TESTOSTERONE REPLACEMENT THERAPY: Primary | ICD-10-CM

## 2025-02-11 DIAGNOSIS — E29.1 HYPOGONADISM MALE: ICD-10-CM

## 2025-02-11 DIAGNOSIS — R35.1 NOCTURIA: ICD-10-CM

## 2025-02-11 DIAGNOSIS — E23.0 HYPOPITUITARISM (MULTI): ICD-10-CM

## 2025-02-11 DIAGNOSIS — Z79.890 ENCOUNTER FOR MONITORING TESTOSTERONE REPLACEMENT THERAPY: Primary | ICD-10-CM

## 2025-02-11 PROCEDURE — 3008F BODY MASS INDEX DOCD: CPT | Performed by: STUDENT IN AN ORGANIZED HEALTH CARE EDUCATION/TRAINING PROGRAM

## 2025-02-11 PROCEDURE — 1159F MED LIST DOCD IN RCRD: CPT | Performed by: STUDENT IN AN ORGANIZED HEALTH CARE EDUCATION/TRAINING PROGRAM

## 2025-02-11 PROCEDURE — 3080F DIAST BP >= 90 MM HG: CPT | Performed by: STUDENT IN AN ORGANIZED HEALTH CARE EDUCATION/TRAINING PROGRAM

## 2025-02-11 PROCEDURE — 3077F SYST BP >= 140 MM HG: CPT | Performed by: STUDENT IN AN ORGANIZED HEALTH CARE EDUCATION/TRAINING PROGRAM

## 2025-02-11 PROCEDURE — G2211 COMPLEX E/M VISIT ADD ON: HCPCS | Performed by: STUDENT IN AN ORGANIZED HEALTH CARE EDUCATION/TRAINING PROGRAM

## 2025-02-11 PROCEDURE — 99215 OFFICE O/P EST HI 40 MIN: CPT | Performed by: STUDENT IN AN ORGANIZED HEALTH CARE EDUCATION/TRAINING PROGRAM

## 2025-02-11 RX ORDER — TESTOSTERONE CYPIONATE 200 MG/ML
100 INJECTION, SOLUTION INTRAMUSCULAR
Qty: 2 ML | Refills: 3 | Status: SHIPPED | OUTPATIENT
Start: 2025-02-11 | End: 2025-09-23

## 2025-02-11 RX ORDER — BUDESONIDE AND FORMOTEROL FUMARATE DIHYDRATE 160; 4.5 UG/1; UG/1
2 AEROSOL RESPIRATORY (INHALATION) 2 TIMES DAILY
Qty: 30.6 EACH | Refills: 1 | Status: SHIPPED | OUTPATIENT
Start: 2025-02-11

## 2025-02-11 NOTE — TELEPHONE ENCOUNTER
Writer called patient on mobile number and left a message on machine informing that script has been sent in as requested. Office phone number was provided for any questions.

## 2025-02-11 NOTE — TELEPHONE ENCOUNTER
Tried to call patient 3x and tried his wife's phone and once I dial the number it is just dead silent.

## 2025-02-11 NOTE — PROGRESS NOTES
"Subjective   Patient ID: Martinez Potter \"Steven\" is a 74 y.o. male who presents for Pituitary Problem.  HPI  Mr. 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 TAkes all meds together  Hydrocortisone 10 mg in am, 7.5 mg at 1 pm and 2.5 mg in the afternoon  DDAVP 0.2 mg in am and 0.2 mg in pm.  Usually does not wake up at night   Was taking salt but now using substitute salt     MRI Brain SWG showed an enhancing mass of the pituitary slightly to the right of midline measuring 1.4 x 1.6 x 1.2 cm. The sella is expanded, pituitary stalk is obscured. There is an 11 mm fluid-filled nonenhancing lesion of the jose star  6/8/2023 MRI sella  Changes of transsphenoidal mass resection are again noted. Multilobulated T2 hyperintense enhancing soft tissue in the postoperative sella and left cavernous sinus measures approximately 2.2 x 1.5 x 1.5 cm (coronal postcontrast image 7 and sagittal postcontrast image 7). Comparison is difficult given differences in technique, however this appears slightly increased from 2015. The infundibulum is not visualized. The mass abuts the optic chiasm and right greater than left cisternal segments of the optic nerves.   Saw Dr. Borjas and he reported no compression on exam  Repeat MRI in July 2024   Stable residual tumor located within the sella and suprasellar cistern and in the left cavernous sinus. Tumor again abuts the inferior surface of the optic chiasm and there is stable probable thinning of the optic chiasm.  Saw Dr. Ceron " and plan repeat MRI in 1 year  Following with Dr. Borjas stable exam   Having severe balance issues walking with a cane  Blood shot redness in his eyes and then happened again later  Has been having headaches around his left eye  Headaches more frequent and more worse. No change in vision. Happening 2 times a week. Takes tylenol which helps the pain  More depressed    Review of Systems  all pertinent systems reviewed and are otherwise negative   Objective   Vitals:    02/11/25 1149   BP: (!) 173/94   Pulse: 57   Temp: 36.4 °C (97.5 °F)       Physical Exam  Constitutional:       General: He is not in acute distress.     Appearance: Normal appearance.      Comments: Uses a cane to ambulate   HENT:      Head: Normocephalic and atraumatic.   Eyes:      Extraocular Movements: Extraocular movements intact.      Pupils: Pupils are equal, round, and reactive to light.   Cardiovascular:      Rate and Rhythm: Normal rate and regular rhythm.   Pulmonary:      Effort: Pulmonary effort is normal. No respiratory distress.      Breath sounds: Normal breath sounds.   Abdominal:      General: Bowel sounds are normal.      Palpations: Abdomen is soft.      Tenderness: There is no abdominal tenderness.   Skin:     Coloration: Skin is not jaundiced or pale.      Findings: No erythema or rash.   Neurological:      General: No focal deficit present.      Mental Status: He is alert and oriented to person, place, and time.      Deep Tendon Reflexes: Reflexes normal.   Psychiatric:         Mood and Affect: Mood normal.         Behavior: Behavior normal.     Labs   Latest Reference Range & Units 11/23/22 15:31 11/11/23 08:57 03/21/24 09:11 07/22/24 09:13 09/12/24 16:16 10/29/24 09:34 11/05/24 11:15   Thyroxine, Free 0.78 - 1.48 ng/dL 1.21 1.04 1.23 1.45      Vitamin D, 25-Hydroxy, Total 30 - 100 ng/mL       41   GLUCOSE 74 - 99 mg/dL 84 91 84 84 82 62 (L) 90   Testosterone, Total, LC-MS/ - 1100 ng/dL 1,529 (H) 763  425       Testosterone, Free 30.0 - 135.0 pg/mL 313.4 (H) 110.6  54.8      (L): Data is abnormally low  (H): Data is abnormally high    MRI July 2024  FINDINGS:  There are postoperative changes from prior transsphenoidal approach  for resection of a sellar mass. There is residual enhancing tissue  located within the sella and suprasellar cistern and protruding into  the left cavernous sinus which is most consistent with residual tumor.      Tumor located within the sella and suprasellar cistern measures 1.5  cm AP x 1.3 cm transverse x 1.7 cm craniocaudal, unchanged. Tumor  continues to abut the pituitary stalk and the inferior border of the  optic chiasm. Question mild thinning of the inferior border of the  optic chiasm. The optic chiasm is overall slightly deviated  inferiorly, could be from prior surgery.      Tumor within the left lateral sella and protruding into the cavernous  sinus is also unchanged and again crosses across the lateral border  of the cavernous segment of the left internal carotid artery. Tumor  within this region measures 1.5 cm AP x 1 cm transverse x 1.7 cm  craniocaudal and is essentially unchanged since prior.      No new abnormal intracranial enhancement or mass.      IMPRESSION:  1. Stable residual tumor located within the sella and suprasellar  cistern and in the left cavernous sinus.      2. Tumor again abuts the inferior surface of the optic chiasm and  there is stable probable thinning of the optic chiasm.      This study was interpreted at [facility].      Assessment/Plan   Mr. 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 TAkes all meds together  Hydrocortisone 10 mg in am, 7.5 mg at 1 pm and 2.5 mg in the afternoon  DDAVP 0.2 mg in am and 0.2 mg in pm.  Usually does not wake up at night   Was taking salt but now using substitute salt     Saw Dr. Borjas and he reported no compression on exam  Repeat MRI in July 2024   Stable residual tumor located within the sella and suprasellar cistern and in the left cavernous sinus. Tumor again abuts the inferior surface of the optic chiasm and there is stable probable thinning of the optic chiasm.  Saw Dr. Ceron and plan repeat MRI in 1 year  Following with Dr. Borjas stable exam   Having severe balance issues walking with a cane  Blood shot redness in his eyes and then happened again later  Has been having headaches around his left eye  Headaches more frequent and more worse. No change in vision. Happening 2 times a week. Takes tylenol which helps the pain  More depressed    Plan:  Renal blood work today  Continue desmopressin 0.2 mg twice daily  Continue HC and levothyroxine  Take HC 3rd dose at 6 pm  Continue testosterone  Follow with neurology  Follow with Dr. Borjas if worsening let me know I ll order an MRI     RTC in 3 months  Assessment & Plan  Hypopituitarism (Multi)    Orders:    testosterone cypionate (Depo-Testosterone) 200 mg/mL injection; Inject 0.5 mL (100 mg) into the muscle every 14 (fourteen) days.    Renal Function Panel; Future    Thyroxine, Free; Future    Testosterone; Future    Hypogonadism male    Orders:    testosterone cypionate (Depo-Testosterone) 200 mg/mL injection; Inject 0.5 mL (100 mg) into the muscle every 14 (fourteen) days.    CBC; Future    Testosterone; Future    PSA; Future    Encounter for monitoring testosterone replacement therapy    Orders:    PSA; Future    Nocturia    Orders:    PSA; Future      Time  spent  Coordination of care and Plan communicated to PCP electronically via EMR  Total time spent 40 min in this encounter including chart review, coordination of care, history physical exam, counseling and placing lab orders and writing note       Isabel Faustin MD 02/11/25 12:05 PM

## 2025-02-11 NOTE — PATIENT INSTRUCTIONS
Renal blood work today  Continue desmopressin 0.2 mg twice daily  Continue HC and levothyroxine  Take HC 3rd dose at 6 pm  Continue testosterone  Follow with neurology  Follow with Dr. Borjas if worsening let me know I ll order an MRI     RTC in 3 months

## 2025-02-12 LAB
ALBUMIN SERPL-MCNC: 4.3 G/DL (ref 3.6–5.1)
BUN SERPL-MCNC: 16 MG/DL (ref 7–25)
BUN/CREAT SERPL: ABNORMAL (CALC) (ref 6–22)
CALCIUM SERPL-MCNC: 9.4 MG/DL (ref 8.6–10.3)
CHLORIDE SERPL-SCNC: 101 MMOL/L (ref 98–110)
CO2 SERPL-SCNC: 30 MMOL/L (ref 20–32)
CREAT SERPL-MCNC: 0.86 MG/DL (ref 0.7–1.28)
EGFRCR SERPLBLD CKD-EPI 2021: 91 ML/MIN/1.73M2
ERYTHROCYTE [DISTWIDTH] IN BLOOD BY AUTOMATED COUNT: 13 % (ref 11–15)
GLUCOSE SERPL-MCNC: 91 MG/DL (ref 65–139)
HCT VFR BLD AUTO: 47 % (ref 38.5–50)
HGB BLD-MCNC: 15.5 G/DL (ref 13.2–17.1)
MCH RBC QN AUTO: 34.2 PG (ref 27–33)
MCHC RBC AUTO-ENTMCNC: 33 G/DL (ref 32–36)
MCV RBC AUTO: 103.8 FL (ref 80–100)
PHOSPHATE SERPL-MCNC: 4.6 MG/DL (ref 2.1–4.3)
PLATELET # BLD AUTO: 308 THOUSAND/UL (ref 140–400)
PMV BLD REES-ECKER: 8.6 FL (ref 7.5–12.5)
POTASSIUM SERPL-SCNC: 4.9 MMOL/L (ref 3.5–5.3)
RBC # BLD AUTO: 4.53 MILLION/UL (ref 4.2–5.8)
SODIUM SERPL-SCNC: 140 MMOL/L (ref 135–146)
T4 FREE SERPL-MCNC: 1.5 NG/DL (ref 0.8–1.8)
TESTOST SERPL-MCNC: 473 NG/DL (ref 250–827)
WBC # BLD AUTO: 7.1 THOUSAND/UL (ref 3.8–10.8)

## 2025-02-13 ENCOUNTER — OFFICE VISIT (OUTPATIENT)
Dept: PULMONOLOGY | Age: 75
End: 2025-02-13
Payer: MEDICARE

## 2025-02-13 VITALS
SYSTOLIC BLOOD PRESSURE: 157 MMHG | HEIGHT: 69 IN | HEART RATE: 65 BPM | BODY MASS INDEX: 26.6 KG/M2 | DIASTOLIC BLOOD PRESSURE: 90 MMHG | OXYGEN SATURATION: 96 % | WEIGHT: 179.6 LBS | RESPIRATION RATE: 18 BRPM | TEMPERATURE: 97.3 F

## 2025-02-13 DIAGNOSIS — R06.09 DYSPNEA ON EXERTION: Primary | ICD-10-CM

## 2025-02-13 PROCEDURE — 3017F COLORECTAL CA SCREEN DOC REV: CPT | Performed by: INTERNAL MEDICINE

## 2025-02-13 PROCEDURE — G8419 CALC BMI OUT NRM PARAM NOF/U: HCPCS | Performed by: INTERNAL MEDICINE

## 2025-02-13 PROCEDURE — 99214 OFFICE O/P EST MOD 30 MIN: CPT | Performed by: INTERNAL MEDICINE

## 2025-02-13 PROCEDURE — 1036F TOBACCO NON-USER: CPT | Performed by: INTERNAL MEDICINE

## 2025-02-13 PROCEDURE — 1123F ACP DISCUSS/DSCN MKR DOCD: CPT | Performed by: INTERNAL MEDICINE

## 2025-02-13 PROCEDURE — 1159F MED LIST DOCD IN RCRD: CPT | Performed by: INTERNAL MEDICINE

## 2025-02-13 PROCEDURE — G8427 DOCREV CUR MEDS BY ELIG CLIN: HCPCS | Performed by: INTERNAL MEDICINE

## 2025-02-13 RX ORDER — PRIMIDONE 50 MG/1
TABLET ORAL
COMMUNITY

## 2025-02-13 RX ORDER — ROSUVASTATIN CALCIUM 10 MG/1
1 TABLET, COATED ORAL
COMMUNITY
Start: 2024-04-10

## 2025-02-13 RX ORDER — HYDROXYCHLOROQUINE SULFATE 200 MG/1
400 TABLET, FILM COATED ORAL DAILY
COMMUNITY

## 2025-02-13 RX ORDER — FOLIC ACID 1 MG/1
1 TABLET ORAL DAILY
COMMUNITY
Start: 2024-09-18

## 2025-02-13 RX ORDER — SYRINGE AND NEEDLE,INSULIN,1ML 28GX1/2"
SYRINGE, EMPTY DISPOSABLE MISCELLANEOUS
COMMUNITY
Start: 2025-01-07

## 2025-02-13 ASSESSMENT — SLEEP AND FATIGUE QUESTIONNAIRES
ESS TOTAL SCORE: 5
HOW LIKELY ARE YOU TO NOD OFF OR FALL ASLEEP WHEN YOU ARE A PASSENGER IN A CAR FOR AN HOUR WITHOUT A BREAK: WOULD NEVER DOZE
HOW LIKELY ARE YOU TO NOD OFF OR FALL ASLEEP IN A CAR, WHILE STOPPED FOR A FEW MINUTES IN TRAFFIC: WOULD NEVER DOZE
HOW LIKELY ARE YOU TO NOD OFF OR FALL ASLEEP WHILE SITTING AND READING: MODERATE CHANCE OF DOZING
HOW LIKELY ARE YOU TO NOD OFF OR FALL ASLEEP WHILE LYING DOWN TO REST IN THE AFTERNOON WHEN CIRCUMSTANCES PERMIT: HIGH CHANCE OF DOZING
HOW LIKELY ARE YOU TO NOD OFF OR FALL ASLEEP WHILE SITTING INACTIVE IN A PUBLIC PLACE: WOULD NEVER DOZE
HOW LIKELY ARE YOU TO NOD OFF OR FALL ASLEEP WHILE SITTING AND TALKING TO SOMEONE: WOULD NEVER DOZE
HOW LIKELY ARE YOU TO NOD OFF OR FALL ASLEEP WHILE SITTING QUIETLY AFTER LUNCH WITHOUT ALCOHOL: WOULD NEVER DOZE
HOW LIKELY ARE YOU TO NOD OFF OR FALL ASLEEP WHILE WATCHING TV: WOULD NEVER DOZE

## 2025-02-13 NOTE — PROGRESS NOTES
.  REASON FOR THE CONSULTATION:  Dyspnea, bronchial asthma  HISTORY OF PRESENT ILLNESS:    Omid Morillo has bronchial asthma chronic and is well-controlled with the use of Symbicort and albuterol.  He does not have to use albuterol very frequently.  He has not felt the need to go to the urgent care or the ER lately.  No evidence of upper respiratory infection no evidence of any sinusitis.  No yellow sputum no excessive wheezing sleeps well at night.  He takes his medications very regularly.    Patient is known to have hypothyroid state and is on replacement therapy.  He already have had COVID-vaccine and flu vaccine.    He also has a history of intracranial tumor most likely originating from the pituitary and is complaining of problem with maintaining his balance.  He is under care of neurologist and neuro neurology and neurosurgery.  .  LUNG CANCER SCREENING     CRITERIA MET    []     CT ORDERED  []      CRITERIA NOT MET   [x]      REFUSED                    []    Non-smoker  REASON CRITERIA NOT MET     SMOKING LESS THAN 30 PY  []      AGE LESS THAN 55 or GREATER 77 YEARS  []      QUIT SMOKING 15 YEARS OR GREATER   []      RECENT CT WITH IN 11 MONTHS    []      LIFE EXPECTANCY < 5 YEARS   []      SIGNS  AND SYMPTOMS OF LUNG CANCER   []         Immunization   Immunization History   Administered Date(s) Administered    COVID-19, MODERNA BLUE border, Primary or Immunocompromised, (age 12y+), IM, 100 mcg/0.5mL 02/24/2021, 03/24/2021, 10/26/2021    COVID-19, MODERNA Bivalent, (age 12y+), IM, 50 mcg/0.5 mL 10/09/2022    COVID-19, MODERNA Booster BLUE border, (age 18y+), IM, 50mcg/0.25mL 04/18/2022    COVID-19, MODERNA, (age 12y+), IM, 50mcg/0.5mL 11/28/2023, 10/14/2024        Pneumococcal Vaccine     [] Up to date    [x] Indicated   [] Refused  [] Contraindicated       Influenza Vaccine   [x] Up to date    [] Indicated   [] Refused  [] Contraindicated          PAST MEDICAL HISTORY:       Diagnosis Date    [facility] (actinic

## 2025-02-18 ENCOUNTER — APPOINTMENT (OUTPATIENT)
Dept: OPHTHALMOLOGY | Facility: CLINIC | Age: 75
End: 2025-02-18
Payer: MEDICARE

## 2025-02-18 DIAGNOSIS — H53.15 VISUAL DISTORTIONS OF SHAPE AND SIZE: ICD-10-CM

## 2025-02-18 DIAGNOSIS — R51.9 NONINTRACTABLE HEADACHE, UNSPECIFIED CHRONICITY PATTERN, UNSPECIFIED HEADACHE TYPE: Primary | ICD-10-CM

## 2025-02-18 DIAGNOSIS — E23.0 HYPOPITUITARISM (MULTI): ICD-10-CM

## 2025-02-18 PROCEDURE — 99214 OFFICE O/P EST MOD 30 MIN: CPT | Performed by: PSYCHIATRY & NEUROLOGY

## 2025-02-18 ASSESSMENT — VISUAL ACUITY
OD_CC: 20/20
OS_PH_CC: 20/20
CORRECTION_TYPE: GLASSES
OS_CC: 20/25
METHOD: SNELLEN - LINEAR
OS_PH_CC+: -3
OD_CC+: -2

## 2025-02-18 ASSESSMENT — ENCOUNTER SYMPTOMS
CARDIOVASCULAR NEGATIVE: 0
MUSCULOSKELETAL NEGATIVE: 0
ENDOCRINE NEGATIVE: 0
CONSTITUTIONAL NEGATIVE: 0
RESPIRATORY NEGATIVE: 0
GASTROINTESTINAL NEGATIVE: 0
NEUROLOGICAL NEGATIVE: 0
EYES NEGATIVE: 1
ALLERGIC/IMMUNOLOGIC NEGATIVE: 0
HEMATOLOGIC/LYMPHATIC NEGATIVE: 0
PSYCHIATRIC NEGATIVE: 0

## 2025-02-18 ASSESSMENT — CONF VISUAL FIELD
OD_INFERIOR_NASAL_RESTRICTION: 0
METHOD: COUNTING FINGERS
OD_SUPERIOR_TEMPORAL_RESTRICTION: 0
OS_SUPERIOR_NASAL_RESTRICTION: 0
OS_NORMAL: 1
OS_INFERIOR_NASAL_RESTRICTION: 0
OS_INFERIOR_TEMPORAL_RESTRICTION: 0
OD_INFERIOR_TEMPORAL_RESTRICTION: 0
OD_SUPERIOR_NASAL_RESTRICTION: 0
OS_SUPERIOR_TEMPORAL_RESTRICTION: 0
OD_NORMAL: 1

## 2025-02-18 ASSESSMENT — REFRACTION_WEARINGRX
OD_ADD: +2.50
OD_AXIS: 110
OS_AXIS: 065
OD_CYLINDER: -1.25
OS_ADD: +2.50
OS_CYLINDER: -1.50
OD_SPHERE: +1.75
OS_SPHERE: +2.00

## 2025-02-18 ASSESSMENT — EXTERNAL EXAM - LEFT EYE: OS_EXAM: NORMAL

## 2025-02-18 ASSESSMENT — TONOMETRY
OD_IOP_MMHG: 16
OS_IOP_MMHG: 16
IOP_METHOD: GOLDMANN APPLANATION

## 2025-02-18 ASSESSMENT — CUP TO DISC RATIO
OD_RATIO: 0.2
OS_RATIO: 0.2

## 2025-02-18 ASSESSMENT — SLIT LAMP EXAM - LIDS
COMMENTS: NORMAL
COMMENTS: NORMAL

## 2025-02-18 ASSESSMENT — EXTERNAL EXAM - RIGHT EYE: OD_EXAM: NORMAL

## 2025-02-18 NOTE — PROGRESS NOTES
Assessment and Plan    07/22/2024 MRI sella with contrast, which I personally reviewed previously, shows enhancing mass of the right sellar contacting the anterior optic chiasm and right optic nerve as well as a possibly separate mass affecting the left cavernous sinus.  06/08/2023 MRI sella with contrast, which I personally reviewed previously, shows a hetereogenously enhancing sellar mass contacting the anterior optic chiasm.  Previous head imaging.    Lab Results   Component Value Date/Time    SEDRATE 2 11/05/2024 1115    CRP 0.39 11/05/2024 1115     Lab Results   Component Value Date/Time    JUQOIXNK30 520 10/22/2024 1202    METHYLACID 0.18 10/22/2024 1202     Tuberculosis studies  Lab Results   Component Value Date/Time    TBSIN Negative 11/05/2024 1115     02/18/2025 OCT RNFL OD 75 & OS 68. (Stable)  10/01/2024 OCT RNFL OD 76 & OS 69. (Stable)  06/18/2024 OCT RNFL OD 76 & OS 68. (Higher OD & stable OS)  12/06/2023 OCT RNFL OD 68 with T & I thinning & OS 67 with T & borderline S thinning. (Cirrus)    02/18/2025 HVF 24-2 OD fovea 34, mild temporal depression MD +1.09 & OS fovea 32, wnl MD -1.69.  10/01/2024 HVF 24-2 OD fovea 39, only on pattern temporal defects MD +0.07 & OS fovea 34, subtle inferior > superior temporal defects MD +0.38.  06/18/2024 HVF 24-2 OD fovea 37, temporal depression, more evident on pattern MD +0.84 & OS fovea 35, temporal depression, more evident on pattern MD +0.41.  12/06/2023 HVF 24-2 OD fovea 36, wnl MD +0.64 & OS fovea 34, temporal rim artifact versus depression MD -1.99.    This 74 year-old man with a history of pituitary tumor status post resection 1980s complicated by hypopituitarism with hypogonadism, adrenal insufficiency and hypothyroidism, essential tremor, rheumatoid arthritis, HTN, asthma presents in follow up for evaluation of visual fields.    Headaches raise some concern for giant cell arteritis although not high concern. I recommend ESR & CRP for stratification. These  levels in 11/5/2024 were reassuring.    The mild temporal vision loss and OCT retinal nerve fiber layer are stable. 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    His description is consistent with subconjunctival hemorrhage, generally benign and self limited.    Regarding hydroxychloroquine, I again recommend monitoring with primary eye care or retina.    Plan    Check ESR & CRP.  Surveillance and management of hypopituitarism.    Follow up in 9-12 months with HVF & OCT. (Dilated 12/6/2023)

## 2025-02-21 ENCOUNTER — TELEPHONE (OUTPATIENT)
Dept: NEUROLOGY | Facility: HOSPITAL | Age: 75
End: 2025-02-21
Payer: MEDICARE

## 2025-02-21 NOTE — TELEPHONE ENCOUNTER
Called patient re:   Saw Neuro-ophthalmologist who saw nothing in exam. Now having bad headaches and bloodshot eyes. Have had 2 months of vestibular physical therapy and still no improvement. Dr Borjas said to see neuro-surgeon but he would not order MRI.No appointment until April . Is there anything else I can do.      Patient indicates he is not seeing significant improvement in gait with vestibular therapy. Also reports he has had 2 episodes of L eye being blood shut and then resolved. Wandering what he can do. He saw Dr. Borjas 2/18 with stable exam. ESR and CRP were ordered to screen for GCA but labs were not done.    Reminded patient of the labs and recommended he reach out to PCP. Patient is agreeable to plan.

## 2025-02-22 DIAGNOSIS — M06.00 RHEUMATOID ARTHRITIS WITH NEGATIVE RHEUMATOID FACTOR, INVOLVING UNSPECIFIED SITE (MULTI): ICD-10-CM

## 2025-02-22 DIAGNOSIS — M19.90 INFLAMMATORY ARTHRITIS: ICD-10-CM

## 2025-02-22 DIAGNOSIS — Z79.899 HIGH RISK MEDICATION USE: ICD-10-CM

## 2025-02-22 DIAGNOSIS — M18.0 OSTEOARTHRITIS OF CARPOMETACARPAL (CMC) JOINTS OF BOTH THUMBS, UNSPECIFIED OSTEOARTHRITIS TYPE: ICD-10-CM

## 2025-02-24 RX ORDER — DICLOFENAC SODIUM 10 MG/G
4 GEL TOPICAL 4 TIMES DAILY PRN
Qty: 100 G | Refills: 1 | Status: SHIPPED | OUTPATIENT
Start: 2025-02-24

## 2025-02-24 NOTE — PROGRESS NOTES
"Martinez Potter is a 74 year-old man with a history of pituitary tumor status post resection 1980s complicated by hypopituitarism with hypogonadism, adrenal insufficiency and hypothyroidism, essential tremor, rheumatoid arthritis, HTN, asthma. I last saw on 10/22/2024 - see copied and pasted assessment/plan below.     10/22/2024 Assessment and Plan:   I personally reviewed MRI of the sella with and without contrast done on 7/22/2024 and compared to prior MRI done on 6/8/2023.  This shows stable 2 areas of residual pituitary tumor in the sella extending into the suprasellar space and also in the left cavernous sinus.  There is at least mild compression of the right optic nerve and optic chiasm and thinning of the optic apparatus.  Given that he has not had any significant change in his vision and overall stable appearance of residual tumor, I recommended continued observation.  I ordered follow-up MRI sella with and without contrast in 1 year.  He needs to continue follow-up with Dr. Tray Borjas in neuro-ophthalmology and Dr. Faustin in endocrinology.     He saw Dr. Borjas 2/18/2025 and \"mild temporal vision loss and OCT retinal nerve fiber layer are stable. I do not see evidence of worsening over time related to pituitary macroadenoma.\" He was assured that his subconjunctival hemorrhage is generally benign and self limited.     He sees Dr. Isabel Faustin in Endocrinology and Dr. Dr. Karyna Brasher in movement disorder neurology.    He feels like his disequilibrium has gotten worse over time if anything.  Certain days are worse than others and he feels it most when he is turning to go a different direction while walking.  He continues to use his cane for balance.  He was recently discharged from vestibular rehab which he feels did not help at all.  He continues to have headaches as well.     I explained that neither his episodes of \"bloodshot eyes\" nor disequilibrium is related to his residual pituitary adenoma.  " His recent vision exam is reassuring as well.  It is hard to know if his headaches are related as well.  We will continue observation for the pituitary adenoma and plan to get 1 year follow-up MRI sella with and without contrast (scheduled to happen this July).    New Ceron MD    Prep Time On Date of the Patient Encounter:    10 Minutes  Time Directly with Patient/Family/Caregiver:    15 Minutes  Additional Time Spent on Patient Care Activities:   0 Minutes  Documentation Time:       5 Minutes  Other Time Spent:       0 Minutes    Details of Other Time Spent:      Total Time on Date of Patient Encounter:    30 Minutes

## 2025-02-25 ENCOUNTER — OFFICE VISIT (OUTPATIENT)
Dept: NEUROSURGERY | Facility: CLINIC | Age: 75
End: 2025-02-25
Payer: MEDICARE

## 2025-02-25 VITALS
SYSTOLIC BLOOD PRESSURE: 158 MMHG | TEMPERATURE: 97.2 F | RESPIRATION RATE: 18 BRPM | DIASTOLIC BLOOD PRESSURE: 96 MMHG | HEART RATE: 59 BPM

## 2025-02-25 DIAGNOSIS — D35.2 PITUITARY ADENOMA (MULTI): Primary | ICD-10-CM

## 2025-02-25 LAB
CRP SERPL-MCNC: <3 MG/L
ERYTHROCYTE [SEDIMENTATION RATE] IN BLOOD BY WESTERGREN METHOD: 2 MM/H

## 2025-02-25 PROCEDURE — 3077F SYST BP >= 140 MM HG: CPT | Performed by: NEUROLOGICAL SURGERY

## 2025-02-25 PROCEDURE — 1036F TOBACCO NON-USER: CPT | Performed by: NEUROLOGICAL SURGERY

## 2025-02-25 PROCEDURE — 3080F DIAST BP >= 90 MM HG: CPT | Performed by: NEUROLOGICAL SURGERY

## 2025-02-25 PROCEDURE — 1126F AMNT PAIN NOTED NONE PRSNT: CPT | Performed by: NEUROLOGICAL SURGERY

## 2025-02-25 PROCEDURE — 99214 OFFICE O/P EST MOD 30 MIN: CPT | Performed by: NEUROLOGICAL SURGERY

## 2025-02-25 PROCEDURE — 1159F MED LIST DOCD IN RCRD: CPT | Performed by: NEUROLOGICAL SURGERY

## 2025-02-25 ASSESSMENT — PAIN SCALES - GENERAL: PAINLEVEL_OUTOF10: 0-NO PAIN

## 2025-02-25 NOTE — Clinical Note
He feels his disequilibrium is worse (failed and discharged from vestibular rehab). I don't think his symptoms are related to his residual pituitary adenoma... will stick to plan of 1 yr repeat MRI sella (this July)

## 2025-03-21 NOTE — ASSESSMENT & PLAN NOTE
Orders:    testosterone cypionate (Depo-Testosterone) 200 mg/mL injection; Inject 0.5 mL (100 mg) into the muscle every 14 (fourteen) days.    Renal Function Panel; Future    Thyroxine, Free; Future    Testosterone; Future

## 2025-03-31 NOTE — TELEPHONE ENCOUNTER
Last appointment: 2/13/25  Next appointment: 8/14/25  Last fill date: 7/13/24    Requested Prescriptions     Pending Prescriptions Disp Refills    montelukast (SINGULAIR) 10 MG tablet [Pharmacy Med Name: MONTELUKAST SOD 10 MG TABLET] 90 tablet 2     Sig: TAKE 1 TABLET BY MOUTH EVERY DAY       Per last dictation, it is unclear if patient is to continue pended medications. Please review and sign if appropriate. Thank you.

## 2025-04-01 ENCOUNTER — APPOINTMENT (OUTPATIENT)
Dept: NEUROSURGERY | Facility: CLINIC | Age: 75
End: 2025-04-01
Payer: MEDICARE

## 2025-04-01 ENCOUNTER — APPOINTMENT (OUTPATIENT)
Dept: OPHTHALMOLOGY | Facility: CLINIC | Age: 75
End: 2025-04-01
Payer: MEDICARE

## 2025-04-03 RX ORDER — MONTELUKAST SODIUM 10 MG/1
10 TABLET ORAL DAILY
Qty: 90 TABLET | Refills: 2 | Status: SHIPPED | OUTPATIENT
Start: 2025-04-03

## 2025-04-09 NOTE — PROGRESS NOTES
"Rheumatology Outpatient Follow up Note    Subjective   Martinez Potter \"Steven\" is a 74 y.o. male presenting today for Joint Pain.    History of Presenting Problem:   Rheum history: diagnosed with RA ~ early 2024. It started with elbow bubbles (?rheumatoid nodules), he also had wrist pain (more around 1st CMC). He had right knee replacement in 2005. He also had chronic low back pain.   No significant pain in MCPs or PIPs. He has right big toe pain and stiffness.   His serologies were negative.  He was diagnosed with seronegative RA    He was started on HCQ/MTX and it helped reduce the ?elbow bubbles. Didn't help much with wrist pain.     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 He continues to have right wrist pain with movements.  He is on MTX and HCQ.        Past Medical History:   Past Medical History:   Diagnosis Date    Acute bronchospasm 09/28/2018    Cough due to bronchospasm    Candidal stomatitis 05/28/2018    Thrush    Contusion of right front wall of thorax, initial encounter 03/25/2021    Rib contusion, right, initial encounter    Cramp and spasm 06/18/2021    Muscle cramps    Encounter for other general examination     Rectal exam    Other injury of unspecified body region, initial encounter 08/07/2015    Musculoskeletal strain    Other specified disorders of left ear 01/03/2020    Sensation of fullness in left ear    Other specified disorders of temporomandibular joint 01/29/2020    TMJ inflammation    Other specified symptoms and signs involving the circulatory and respiratory systems 09/28/2018    Chest sounds abnormal on percussion or auscultation    Otitis media, unspecified, left ear 10/04/2019    Acute left otitis media "    Personal history of other diseases of male genital organs     History of erectile dysfunction    Personal history of other diseases of the nervous system and sense organs 02/10/2017    History of tinnitus    Personal history of other diseases of the respiratory system 09/28/2018    History of acute sinusitis    Personal history of other diseases of the respiratory system 10/04/2019    History of acute sinusitis    Personal history of other diseases of the respiratory system 12/19/2019    History of paranasal sinus congestion    Personal history of other specified conditions     History of pituitary neoplasm    Personal history of other specified conditions 02/16/2021    History of epistaxis    Personal history of other specified conditions 11/28/2019    History of facial pain    Personal history of other specified conditions 11/28/2019    History of postnasal drip    Personal history of other specified conditions 12/19/2019    History of persistent cough    Personal history of other specified conditions 09/28/2018    History of persistent cough    Personal history of other specified conditions 09/28/2018    History of hoarseness    Personal history of other specified conditions 09/28/2018    History of vertigo    Spinal stenosis, lumbosacral region 10/07/2021    Lumbosacral spinal stenosis    Tinnitus, left ear 01/29/2020    Tinnitus of left ear    Unspecified injury of thorax, initial encounter 03/25/2021    Rib injury       Allergies:   Allergies   Allergen Reactions    Pollen Extracts Other       Medications:   Current Outpatient Medications:     albuterol 90 mcg/actuation inhaler, Inhale 2 puffs., Disp: , Rfl:     amLODIPine (Norvasc) 2.5 mg tablet, Take 1 tablet (2.5 mg) by mouth once daily., Disp: , Rfl:     Arexvy, PF, 120 mcg/0.5 mL suspension for reconstitution, , Disp: , Rfl:     budesonide-formoteroL (Symbicort) 160-4.5 mcg/actuation inhaler, Inhale 2 puffs twice a day., Disp: , Rfl:     desmopressin  "(DDAVP) 0.1 mg tablet, Take 2 tablets (0.2 mg) by mouth 2 times a day., Disp: 360 tablet, Rfl: 3    diclofenac sodium (Voltaren) 1 % gel, APPLY 4.5 INCHES (4 G) TOPICALLY 4 TIMES A DAY AS NEEDED (THUMB PAIN)., Disp: 100 g, Rfl: 1    fluticasone (Flonase Allergy Relief) 50 mcg/actuation nasal spray, Administer 2 sprays into each nostril once daily., Disp: , Rfl:     folic acid (Folvite) 1 mg tablet, Take 1 tablet (1 mg) by mouth once daily., Disp: 30 tablet, Rfl: 2    hydrocortisone (Cortef) 10 mg tablet, Take 1 tablet (10 mg) by mouth once daily in the morning. Take before meals., Disp: 90 tablet, Rfl: 3    hydrocortisone (Cortef) 5 mg tablet, TAKE 1 & 1/2 TABLETS AROUND 1PM AND 1/2 TABLET IN THE AFTERNOON (Patient not taking: Reported on 2/25/2025), Disp: 360 tablet, Rfl: 3    hydrocortisone (Cortef) 5 mg tablet, TAKE 1 & 1/2 TABLETS AROUND 1PM AND 1/2 TABLET IN THE AFTERNOON, Disp: 360 tablet, Rfl: 3    hydroxychloroquine (Plaquenil) 200 mg tablet, Take 2 tablets (400 mg) by mouth once daily., Disp: 60 tablet, Rfl: 2    insulin syringe-needle U-100 1 mL 28 gauge x 1/2\" syringe, Use as directed every 1 week., Disp: 10 each, Rfl: 1    levothyroxine (Synthroid, Levoxyl) 150 mcg tablet, Take 1 tablet (150 mcg) by mouth once daily in the morning. Take before meals., Disp: 90 tablet, Rfl: 3    methotrexate 25 mg/mL injection, Inject 0.7mL (17.5mg) under the skin once weekly, Disp: 4 mL, Rfl: 3    montelukast (Singulair) 10 mg tablet, Take 1 tablet (10 mg) by mouth once daily at bedtime., Disp: , Rfl:     multivitamin with minerals iron-free (Multivitamin 50 Plus), Take 1 tablet by mouth once daily., Disp: , Rfl:     primidone (Mysoline) 50 mg tablet, Take 2 tablets (100 mg) by mouth once daily at bedtime., Disp: 180 tablet, Rfl: 3    rosuvastatin (Crestor) 10 mg tablet, Take 1 tablet (10 mg) by mouth early in the morning.., Disp: , Rfl:     syringe with needle 3 mL 22 x 1 1/2\" syringe, Use as directed every 2 weeks., " "Disp: 20 each, Rfl: 3    testosterone cypionate (Depo-Testosterone) 200 mg/mL injection, Inject 0.5 mL (100 mg) into the muscle every 14 (fourteen) days., Disp: 2 mL, Rfl: 3    traMADol (Ultram) 50 mg tablet, Take 1 tablet (50 mg) by mouth every 8 hours if needed. (Patient not taking: Reported on 2/25/2025), Disp: , Rfl:     Review of Systems:   All 10 review of systems have been reviewed and are negative for complaint except as noted in the HPI    Objective   Physical Examination:  Vitals:    04/10/25 0851   BP: 133/80   Pulse: 59   Temp: 36.6 °C (97.9 °F)         Growth %ile SmartLinks can only be used for patients less than 20 years old.  Ht Readings from Last 1 Encounters:   04/10/25 1.702 m (5' 7\")     Wt Readings from Last 1 Encounters:   04/10/25 78.5 kg (173 lb)       General - NAD, sitting up in chair, well-groomed, pleasant, AAOx3  Head: Normocephalic, atraumatic  Eyes - PERRLA, EOMI. No conjunctiva injection.   Skin - No rashes or ulcers. Skin warm and dry. No erythema on bilateral cheeks.  Extremities - No edema, cyanosis ,or clubbing  Neurological - Alert and oriented x 3,  grossly intact. No focal deficit.  Musculoskeletal -  Shoulders: Full ROM, without pain, no swelling, warmth or tenderness.  Elbows: Full ROM, without pain, no swelling, warmth or tenderness.  Wrists: right wrist synovial thickening. Limited extension/flexion.   MCP: No swelling, warmth or tenderness. Metacarpal squeeze negative  PIP: No swelling, warmth or tenderness.  DIP: No swelling, warmth or tenderness. OA changes  Hands : 5/5.    Sacroiliac joints: No local tenderness. RAMANDEEP negative.   Hips: Full ROM.  No malalignment.  Knees:  Full ROM, without pain, no swelling, warmth or point tenderness.   Ankles: Full ROM, without pain, swelling, warmth or tenderness.  Toes:  Metatarsal squeeze positive (right)  Cervical spine: No tenderness or limitation of movement  Lumbar spine: No tenderness or limitation of " "movement        Imaging:  MRI Right wrist 9/2024:  IMPRESSION:  1. Erosive changes of the scaphoid, trapezoid, capitate, and  triquetrum with prominent cystic changes likely from inflammatory  arthropathy.  2. Severe osteoarthrosis of the radioscaphoid articulation and  intercarpal joints. Mild osteoarthrosis of the carpometacarpal joints.  3. Moderate joint effusion of the intercarpal and distal radioulnar  joints.    Assessment/Plan   Martinez Potter \"Rebekah" is a 74 y.o. male with PMHx of HTN, SNHL, GERD, vitamin D deficiency, Lumbar spondylosis, hypopituitarism, possible multiple myeloma, Asthma, and seronegative RA who is presenting today as a follow up for RA    ##Inflammatory arthritis:  -Manifested by possible elbow nodules (reported but now resolved), right wrist pain and swelling episodes, polyarthralgia. MRI of right wrist showing erosions. RF-, CCP-. GAGAN-  -Hand/feet xray's 11/2024: OA changes  -MRI of right wrist reviewed as above and consistent with inflammatory arthritis  -Reviewed labs 11/2024: ESR/CRP/CBC/CMP normal . ESR/CRP normal 2/2025  -Currently stable on MTX subq and HCQand will continue  -Encouraged to increase exercise and physical activity  -Advised to improve cardiovascular risk factors (smoking, BP, diabetes, lipids..etc)    ##CMC arthritis:  -His 1st CMC pain is likely 2/2 OA and he received bilateral cortisone injections 2/2025.  Will continue use Voltaren cream and thumb brace.     ##Medical management of high risk medication:  -Drug name: MTX subq  -Tolerating injection site and no side effects  -Labs reviewed   -Most recent TB/hepatitis test: negative in 11/2024  -Patient is advised to hold therapy if they experience any signs of infection requiring antibiotic therapy, and to resume after full recovery and conclusion of antibiotic course. Patient is advised to contact provider in this case.  -Educated the patient about risks and benefits of such therapy, and that the benefits of this " therapy outweigh the risks and thus we will continue.    ##HCQ long term use:  -Educated the patient about the potential side effects of using antimalarials. Serious side effects are extremely rare. However, the fear of vision-threatening toxic retinopathy remains a major concern, this can be avoided when using the appropriate doses along with routine ocular monitoring.  -Current dose is 400 mg daily (<5mg/kg/day)  -Last eye exam: 5/2024      The assessment and plan, risk and benefits were discussed with the patient. All of the patients questions were answered and patient agrees to the plan.      Melanie Perez MD  Clinical   Department of Rheumatology   [facility]

## 2025-04-10 ENCOUNTER — APPOINTMENT (OUTPATIENT)
Facility: CLINIC | Age: 75
End: 2025-04-10
Payer: MEDICARE

## 2025-04-10 VITALS
HEIGHT: 67 IN | DIASTOLIC BLOOD PRESSURE: 80 MMHG | WEIGHT: 173 LBS | SYSTOLIC BLOOD PRESSURE: 133 MMHG | BODY MASS INDEX: 27.15 KG/M2 | TEMPERATURE: 97.9 F | HEART RATE: 59 BPM

## 2025-04-10 DIAGNOSIS — M06.00 RHEUMATOID ARTHRITIS WITH NEGATIVE RHEUMATOID FACTOR, INVOLVING UNSPECIFIED SITE (MULTI): ICD-10-CM

## 2025-04-10 DIAGNOSIS — M18.0 OSTEOARTHRITIS OF CARPOMETACARPAL (CMC) JOINTS OF BOTH THUMBS, UNSPECIFIED OSTEOARTHRITIS TYPE: Primary | ICD-10-CM

## 2025-04-10 DIAGNOSIS — M19.90 INFLAMMATORY ARTHRITIS: ICD-10-CM

## 2025-04-10 DIAGNOSIS — Z79.899 HIGH RISK MEDICATION USE: ICD-10-CM

## 2025-04-10 PROCEDURE — 3075F SYST BP GE 130 - 139MM HG: CPT | Performed by: STUDENT IN AN ORGANIZED HEALTH CARE EDUCATION/TRAINING PROGRAM

## 2025-04-10 PROCEDURE — 1036F TOBACCO NON-USER: CPT | Performed by: STUDENT IN AN ORGANIZED HEALTH CARE EDUCATION/TRAINING PROGRAM

## 2025-04-10 PROCEDURE — 1160F RVW MEDS BY RX/DR IN RCRD: CPT | Performed by: STUDENT IN AN ORGANIZED HEALTH CARE EDUCATION/TRAINING PROGRAM

## 2025-04-10 PROCEDURE — G2211 COMPLEX E/M VISIT ADD ON: HCPCS | Performed by: STUDENT IN AN ORGANIZED HEALTH CARE EDUCATION/TRAINING PROGRAM

## 2025-04-10 PROCEDURE — 3008F BODY MASS INDEX DOCD: CPT | Performed by: STUDENT IN AN ORGANIZED HEALTH CARE EDUCATION/TRAINING PROGRAM

## 2025-04-10 PROCEDURE — 1159F MED LIST DOCD IN RCRD: CPT | Performed by: STUDENT IN AN ORGANIZED HEALTH CARE EDUCATION/TRAINING PROGRAM

## 2025-04-10 PROCEDURE — 99215 OFFICE O/P EST HI 40 MIN: CPT | Performed by: STUDENT IN AN ORGANIZED HEALTH CARE EDUCATION/TRAINING PROGRAM

## 2025-04-10 PROCEDURE — 3079F DIAST BP 80-89 MM HG: CPT | Performed by: STUDENT IN AN ORGANIZED HEALTH CARE EDUCATION/TRAINING PROGRAM

## 2025-04-10 RX ORDER — HYDROXYCHLOROQUINE SULFATE 200 MG/1
400 TABLET, FILM COATED ORAL DAILY
Qty: 60 TABLET | Refills: 2 | Status: SHIPPED | OUTPATIENT
Start: 2025-04-10

## 2025-04-10 RX ORDER — FOLIC ACID 1 MG/1
1 TABLET ORAL DAILY
Qty: 30 TABLET | Refills: 2 | Status: SHIPPED | OUTPATIENT
Start: 2025-04-10

## 2025-04-10 RX ORDER — METHOTREXATE 25 MG/ML
INJECTION, SOLUTION INTRAMUSCULAR; INTRATHECAL; INTRAVENOUS; SUBCUTANEOUS
Qty: 4 ML | Refills: 3 | Status: SHIPPED | OUTPATIENT
Start: 2025-04-10

## 2025-04-10 RX ORDER — SYRINGE-NEEDLE,INSULIN,0.5 ML 27GX1/2"
SYRINGE, EMPTY DISPOSABLE MISCELLANEOUS
Qty: 10 EACH | Refills: 1 | Status: SHIPPED | OUTPATIENT
Start: 2025-04-10

## 2025-04-11 LAB
ALBUMIN SERPL-MCNC: 4.3 G/DL (ref 3.6–5.1)
ALP SERPL-CCNC: 96 U/L (ref 35–144)
ALT SERPL-CCNC: 31 U/L (ref 9–46)
ANION GAP SERPL CALCULATED.4IONS-SCNC: 10 MMOL/L (CALC) (ref 7–17)
AST SERPL-CCNC: 26 U/L (ref 10–35)
BASOPHILS # BLD AUTO: 50 CELLS/UL (ref 0–200)
BASOPHILS NFR BLD AUTO: 0.8 %
BILIRUB SERPL-MCNC: 0.5 MG/DL (ref 0.2–1.2)
BUN SERPL-MCNC: 16 MG/DL (ref 7–25)
CALCIUM SERPL-MCNC: 9.1 MG/DL (ref 8.6–10.3)
CHLORIDE SERPL-SCNC: 98 MMOL/L (ref 98–110)
CO2 SERPL-SCNC: 27 MMOL/L (ref 20–32)
CREAT SERPL-MCNC: 0.71 MG/DL (ref 0.7–1.28)
CRP SERPL-MCNC: <3 MG/L
EGFRCR SERPLBLD CKD-EPI 2021: 97 ML/MIN/1.73M2
EOSINOPHIL # BLD AUTO: 62 CELLS/UL (ref 15–500)
EOSINOPHIL NFR BLD AUTO: 1 %
ERYTHROCYTE [DISTWIDTH] IN BLOOD BY AUTOMATED COUNT: 12.6 % (ref 11–15)
ERYTHROCYTE [SEDIMENTATION RATE] IN BLOOD BY WESTERGREN METHOD: 2 MM/H
GLUCOSE SERPL-MCNC: 79 MG/DL (ref 65–99)
HCT VFR BLD AUTO: 43.9 % (ref 38.5–50)
HGB BLD-MCNC: 14.8 G/DL (ref 13.2–17.1)
LYMPHOCYTES # BLD AUTO: 1290 CELLS/UL (ref 850–3900)
LYMPHOCYTES NFR BLD AUTO: 20.8 %
MCH RBC QN AUTO: 33.6 PG (ref 27–33)
MCHC RBC AUTO-ENTMCNC: 33.7 G/DL (ref 32–36)
MCV RBC AUTO: 99.5 FL (ref 80–100)
MONOCYTES # BLD AUTO: 812 CELLS/UL (ref 200–950)
MONOCYTES NFR BLD AUTO: 13.1 %
NEUTROPHILS # BLD AUTO: 3987 CELLS/UL (ref 1500–7800)
NEUTROPHILS NFR BLD AUTO: 64.3 %
PLATELET # BLD AUTO: 329 THOUSAND/UL (ref 140–400)
PMV BLD REES-ECKER: 8.5 FL (ref 7.5–12.5)
POTASSIUM SERPL-SCNC: 5.1 MMOL/L (ref 3.5–5.3)
PROT SERPL-MCNC: 6 G/DL (ref 6.1–8.1)
RBC # BLD AUTO: 4.41 MILLION/UL (ref 4.2–5.8)
SODIUM SERPL-SCNC: 135 MMOL/L (ref 135–146)
WBC # BLD AUTO: 6.2 THOUSAND/UL (ref 3.8–10.8)

## 2025-04-17 DIAGNOSIS — M06.00 RHEUMATOID ARTHRITIS WITH NEGATIVE RHEUMATOID FACTOR, INVOLVING UNSPECIFIED SITE (MULTI): ICD-10-CM

## 2025-04-17 DIAGNOSIS — M18.0 OSTEOARTHRITIS OF CARPOMETACARPAL (CMC) JOINTS OF BOTH THUMBS, UNSPECIFIED OSTEOARTHRITIS TYPE: ICD-10-CM

## 2025-04-17 DIAGNOSIS — M19.90 INFLAMMATORY ARTHRITIS: ICD-10-CM

## 2025-04-17 DIAGNOSIS — Z79.899 HIGH RISK MEDICATION USE: ICD-10-CM

## 2025-04-17 RX ORDER — DICLOFENAC SODIUM 10 MG/G
4 GEL TOPICAL 4 TIMES DAILY PRN
Qty: 100 G | Refills: 1 | Status: SHIPPED | OUTPATIENT
Start: 2025-04-17

## 2025-04-28 ENCOUNTER — APPOINTMENT (OUTPATIENT)
Dept: RHEUMATOLOGY | Facility: CLINIC | Age: 75
End: 2025-04-28
Payer: MEDICARE

## 2025-04-28 VITALS
BODY MASS INDEX: 25.46 KG/M2 | HEART RATE: 60 BPM | WEIGHT: 168 LBS | DIASTOLIC BLOOD PRESSURE: 86 MMHG | SYSTOLIC BLOOD PRESSURE: 154 MMHG | HEIGHT: 68 IN

## 2025-04-28 DIAGNOSIS — M65.931 SYNOVITIS OF RIGHT WRIST: Primary | ICD-10-CM

## 2025-04-28 PROCEDURE — 3008F BODY MASS INDEX DOCD: CPT | Performed by: INTERNAL MEDICINE

## 2025-04-28 PROCEDURE — 3079F DIAST BP 80-89 MM HG: CPT | Performed by: INTERNAL MEDICINE

## 2025-04-28 PROCEDURE — 3077F SYST BP >= 140 MM HG: CPT | Performed by: INTERNAL MEDICINE

## 2025-04-28 PROCEDURE — 1036F TOBACCO NON-USER: CPT | Performed by: INTERNAL MEDICINE

## 2025-04-28 PROCEDURE — 20606 DRAIN/INJ JOINT/BURSA W/US: CPT | Performed by: INTERNAL MEDICINE

## 2025-04-28 RX ORDER — LIDOCAINE HYDROCHLORIDE 10 MG/ML
0.5 INJECTION, SOLUTION INFILTRATION; PERINEURAL
Status: COMPLETED | OUTPATIENT
Start: 2025-04-28 | End: 2025-04-28

## 2025-04-28 RX ADMIN — LIDOCAINE HYDROCHLORIDE 0.5 ML: 10 INJECTION, SOLUTION INFILTRATION; PERINEURAL at 13:44

## 2025-04-28 ASSESSMENT — ENCOUNTER SYMPTOMS: ARTHRALGIAS: 1

## 2025-04-28 NOTE — PROGRESS NOTES
"Patient ID: Martinez Potter \"Steven\" is a 74 y.o. male.  Presents for ultrasound guided right wrist injection  Referred by Dr Melanie Perez - Primary Rheumatologist      Performed by: Campbell Mae MD  Authorized by: Campbell Mae MD        Comments: See [facility] ultrasound notes  Musculoskeletal Ultrasound    Date/Time: 4/28/2025 12:35 PM    Performed by: Campbell Mae MD  Authorized by: Campbell Mae MD    Procedure details:     Indications: joint pain    Comments:        High frequency ultrasound used to identify area of synovitis in the wrist  Radiocarpal joint in longtidunial and transverse view with synovial proliferation seen  No inflammation in the distal radioulnar joint seen by ulrasound    Under aspect precautions, With transverse in plane technique, 20 mg kenalog and 0.5 ml lidocaine injected in to the radiocarpal joint from radial approach after identification of extensor tendon comparments.  Patient tolerated the procedure well.      Medium Joint Injection/Arthrocentesis: R radiocarpal on 4/28/2025 1:44 PM  Details: 25 G needle, ultrasound-guided dorsal approach  Medications: 20 mg triamcinolone acetonide 10 mg/mL; 0.5 mL lidocaine 10 mg/mL (1 %)    See [facility] ultrasound details for procedure note  Patient was prepped and draped in the usual sterile fashion.         Campbell Mae MD    of Medicine  [facility] - Department of Rheumatology  [facility]         "

## 2025-05-19 NOTE — TELEPHONE ENCOUNTER
LAST VISIT: 2/13/25  NEXT VISIT: 8/14/25    Per last dictation patient to continue this medication. Please sign for refill if ok. Thank you.

## 2025-05-22 RX ORDER — ALBUTEROL SULFATE 90 UG/1
2 INHALANT RESPIRATORY (INHALATION) 4 TIMES DAILY PRN
Qty: 3 EACH | Refills: 2 | Status: SHIPPED | OUTPATIENT
Start: 2025-05-22

## 2025-06-09 ENCOUNTER — OFFICE VISIT (OUTPATIENT)
Dept: NEUROLOGY | Facility: CLINIC | Age: 75
End: 2025-06-09
Payer: MEDICARE

## 2025-06-09 VITALS
WEIGHT: 163 LBS | BODY MASS INDEX: 24.71 KG/M2 | SYSTOLIC BLOOD PRESSURE: 130 MMHG | HEIGHT: 68 IN | DIASTOLIC BLOOD PRESSURE: 83 MMHG | HEART RATE: 57 BPM

## 2025-06-09 DIAGNOSIS — I95.1 ORTHOSTATIC HYPOTENSION: ICD-10-CM

## 2025-06-09 DIAGNOSIS — G62.9 NEUROPATHY: ICD-10-CM

## 2025-06-09 DIAGNOSIS — G25.0 ESSENTIAL TREMOR: ICD-10-CM

## 2025-06-09 DIAGNOSIS — R26.81 GAIT INSTABILITY: Primary | ICD-10-CM

## 2025-06-09 PROCEDURE — 99213 OFFICE O/P EST LOW 20 MIN: CPT | Performed by: PSYCHIATRY & NEUROLOGY

## 2025-06-09 PROCEDURE — G2211 COMPLEX E/M VISIT ADD ON: HCPCS | Performed by: PSYCHIATRY & NEUROLOGY

## 2025-06-09 PROCEDURE — 3008F BODY MASS INDEX DOCD: CPT | Performed by: PSYCHIATRY & NEUROLOGY

## 2025-06-09 PROCEDURE — 1036F TOBACCO NON-USER: CPT | Performed by: PSYCHIATRY & NEUROLOGY

## 2025-06-09 PROCEDURE — 1160F RVW MEDS BY RX/DR IN RCRD: CPT | Performed by: PSYCHIATRY & NEUROLOGY

## 2025-06-09 PROCEDURE — 3075F SYST BP GE 130 - 139MM HG: CPT | Performed by: PSYCHIATRY & NEUROLOGY

## 2025-06-09 PROCEDURE — 3079F DIAST BP 80-89 MM HG: CPT | Performed by: PSYCHIATRY & NEUROLOGY

## 2025-06-09 PROCEDURE — 1159F MED LIST DOCD IN RCRD: CPT | Performed by: PSYCHIATRY & NEUROLOGY

## 2025-06-09 RX ORDER — PRIMIDONE 50 MG/1
100 TABLET ORAL NIGHTLY
Qty: 180 TABLET | Refills: 3 | Status: SHIPPED | OUTPATIENT
Start: 2025-06-09 | End: 2026-06-09

## 2025-06-09 ASSESSMENT — ENCOUNTER SYMPTOMS
OCCASIONAL FEELINGS OF UNSTEADINESS: 0
DEPRESSION: 0
LOSS OF SENSATION IN FEET: 0

## 2025-06-09 ASSESSMENT — ANXIETY QUESTIONNAIRES
7. FEELING AFRAID AS IF SOMETHING AWFUL MIGHT HAPPEN: NOT AT ALL
2. NOT BEING ABLE TO STOP OR CONTROL WORRYING: NOT AT ALL
5. BEING SO RESTLESS THAT IT IS HARD TO SIT STILL: NOT AT ALL
1. FEELING NERVOUS, ANXIOUS, OR ON EDGE: NOT AT ALL
IF YOU CHECKED OFF ANY PROBLEMS ON THIS QUESTIONNAIRE, HOW DIFFICULT HAVE THESE PROBLEMS MADE IT FOR YOU TO DO YOUR WORK, TAKE CARE OF THINGS AT HOME, OR GET ALONG WITH OTHER PEOPLE: NOT DIFFICULT AT ALL
3. WORRYING TOO MUCH ABOUT DIFFERENT THINGS: NOT AT ALL
4. TROUBLE RELAXING: NOT AT ALL
GAD7 TOTAL SCORE: 0
6. BECOMING EASILY ANNOYED OR IRRITABLE: NOT AT ALL

## 2025-06-09 ASSESSMENT — PATIENT HEALTH QUESTIONNAIRE - PHQ9
2. FEELING DOWN, DEPRESSED OR HOPELESS: NOT AT ALL
SUM OF ALL RESPONSES TO PHQ9 QUESTIONS 1 AND 2: 0
1. LITTLE INTEREST OR PLEASURE IN DOING THINGS: NOT AT ALL

## 2025-06-09 NOTE — LETTER
June 9, 2025     Tania Hawkins,   [address]  [facility] [address]    Patient: Steven Potter   YOB: 1950   Date of Visit: 6/9/2025       Dear Dr. Tania Hawkins, DO:    Thank you for referring Steven Potter to me for evaluation. Below are my notes for this consultation.  If you have questions, please do not hesitate to call me. I look forward to following your patient along with you.       Sincerely,     Karyna Brasher MD      CC: No Recipients  ______________________________________________________________________________________    Subjective    Martinez Potter is a right handed  74 y.o. 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 Patient is accompanied by: spouse  Visit type: follow up.     He feels that his gait is better now, he cannot stand and turn real quickly, if he turns he will still loose balance.     He has started a supplement called Claritox Plus, has taken for 4 months and has helped significantly.  Also did vestibular rehab for 2 months and stated that did not help at all.   He denies numbness in his feet. No shuffling. No assistive devices, no falls. If he has to make a sudden turn, he does not feel good still. He is able to work in the yard or garden,  he can do mulching and use a wheel barrel etc  The lightheadedness is better as well.       Tremor:  Tremors are good w primidone.   Have occasional days when it is bad, but for most part it is really good. No apparent rest component. Not noticeably changed with alcohol, but only drinks one glass of wine occasionally.  Tremor is well controlled with current dose of meds.       Balance disorder:  Has bilateral ear ringing but appears to be getting better. He does this sound therapy.   Hearing is good he thinks.    Has had tilt table testing for orthostatic hypotension which was positive. No other falls  recently. Endorses some unsteadiness associated with standing up too fast or standing still for too long.     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  In addition, there were findings suggestive of a superimposed chronic left L5 radiculopathy without active denervation. Work up included SPEP/UPEP whch showed monoclonaal IgM [place]. Seen by hematology for same.     Also has a pituitary adenoma for which he follows w Dr Ceron from neurosurgery.     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  Denies any hypophonia or decreased size of handwriting. No diarrhea or constipation. Does move around a lot in sleep (states since childhood). Not clearly acting out dreams. No significant changes in cognitive function. Endorses frustration with tremor, but denies depression or anxiety. Drinks one cup of coffee a day.    No known family history of tremor. Has two brothers that are not in regular contact. No family history of Parkinson's.    Social Hx: Second hand smoke as child, otherwise no tobacco. Occasional glass of wine, 3x/week. No recreational substance use.    Patient Active Problem List   Diagnosis   • Essential tremor   • Hypogonadism in male   • Vertigo   • Acute sinusitis   • AK (actinic keratosis)   • Benign essential hypertension   • Benign prostatic hyperplasia without urinary obstruction   • Bilateral leg edema   • Bilateral vocal cord paresis   • Chest pain   • Chronic back pain   • Chronic pain syndrome   • Colon polyps   • Congestion of paranasal sinus   • Deviated nasal septum   • Diabetes insipidus   • Dyspnea on exertion   • Elbow pain, right   • Epistaxis   • Facial pain   • Gastroesophageal reflux disease   • H/O: stroke   • Hoarseness of voice   • HTN (hypertension)   • Hypopituitarism (Multi)   • Panhypopituitarism (Multi)   • Pituitary tumor   • Secondary hypothyroidism   • Laceration of right forearm   • Lumbar  post-laminectomy syndrome   • Lumbar radiculopathy   • Lumbosacral spinal stenosis   • Lumbosacral spondylosis   • Neoplasm of uncertain behavior of skin   • Nocturia   • Cervical dysphagia   • Disorder of pharynx   • Dysphagia   • Orthostatic hypotension   • Pain in right shoulder   • Pain in right wrist   • Asthma   • Persistent cough   • Pituitary adenoma (Multi)   • Rib pain on right side   • Sacroiliitis, not elsewhere classified   • Secondary hypocortisolism (Multi)   • Sensorineural hearing loss (SNHL) of left ear with unrestricted hearing of right ear   • Seronegative rheumatoid arthritis (Multi)   • Solar purpura   • Syncope and collapse   • Ureteral stone   • Vitamin D deficiency   • Monoclonal gammopathy present on serum protein electrophoresis   • Mixed hyperlipidemia   • 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)      Past Medical History:   Diagnosis Date   • Acute bronchospasm 09/28/2018    Cough due to bronchospasm   • Candidal stomatitis 05/28/2018    Thrush   • Contusion of right front wall of thorax, initial encounter 03/25/2021    Rib contusion, right, initial encounter   • Cramp and spasm 06/18/2021    Muscle cramps   • Encounter for other general examination     Rectal exam   • Other injury of unspecified body region, initial encounter 08/07/2015    Musculoskeletal strain   • Other specified disorders of left ear 01/03/2020    Sensation of fullness in left ear   • Other specified disorders of temporomandibular joint 01/29/2020    TMJ inflammation   • Other specified symptoms and signs involving the circulatory and respiratory systems 09/28/2018    Chest sounds abnormal on percussion or auscultation   • Otitis media, unspecified, left ear 10/04/2019    Acute left otitis media   • Personal history of other diseases of male genital organs     History of erectile dysfunction   • Personal history of other diseases of the nervous system and sense organs 02/10/2017    History of tinnitus   •  Personal history of other diseases of the respiratory system 09/28/2018    History of acute sinusitis   • Personal history of other diseases of the respiratory system 10/04/2019    History of acute sinusitis   • Personal history of other diseases of the respiratory system 12/19/2019    History of paranasal sinus congestion   • Personal history of other specified conditions     History of pituitary neoplasm   • Personal history of other specified conditions 02/16/2021    History of epistaxis   • Personal history of other specified conditions 11/28/2019    History of facial pain   • Personal history of other specified conditions 11/28/2019    History of postnasal drip   • Personal history of other specified conditions 12/19/2019    History of persistent cough   • Personal history of other specified conditions 09/28/2018    History of persistent cough   • Personal history of other specified conditions 09/28/2018    History of hoarseness   • Personal history of other specified conditions 09/28/2018    History of vertigo   • Spinal stenosis, lumbosacral region 10/07/2021    Lumbosacral spinal stenosis   • Tinnitus, left ear 01/29/2020    Tinnitus of left ear   • Unspecified injury of thorax, initial encounter 03/25/2021    Rib injury      Past Surgical History:   Procedure Laterality Date   • OTHER SURGICAL HISTORY  03/13/2018    Excision Of Pituitary Gland Partial   • OTHER SURGICAL HISTORY  11/27/2019    Sinus surgery   • TONSILLECTOMY  03/13/2018    Tonsillectomy   • TOTAL KNEE ARTHROPLASTY  03/13/2018    Knee Replacement      Social History     Socioeconomic History   • Marital status:      Spouse name: Not on file   • Number of children: Not on file   • Years of education: Not on file   • Highest education level: Not on file   Occupational History   • Not on file   Tobacco Use   • Smoking status: Never   • Smokeless tobacco: Never   Substance and Sexual Activity   • Alcohol use: Yes     Comment: RARELY   •  Drug use: Never   • Sexual activity: Not on file   Other Topics Concern   • Not on file   Social History Narrative   • Not on file     Social Drivers of Health     Financial Resource Strain: Not on file   Food Insecurity: Not on file   Transportation Needs: Not on file   Physical Activity: Not on file   Stress: Not on file   Social Connections: Not on file   Intimate Partner Violence: Not on file   Housing Stability: Not on file      No family history on file.              Review of Systems  All other system have been reviewed and are negative for complaint.    There were no vitals filed for this visit.    Objective  Neurological Exam    MENTAL STATE: Orientation was normal to person, place, and time    Cranial nerves: extra ocular movements are intact, no nystagmus.     COORDINATION: Coordination exam was normal. In both upper extremities, finger-nose-finger was intact without dysmetria or overshoot.     Gait: walks narrow based, can tandem!    Movement specific:  Mild bradykinesia w finger taps but no other decrements.   Mild no-no head tremor. No tremor in finger nose, no resting tremor and posture.                Assessment/Plan  Diagnoses and all orders for this visit:  Essential tremor  -     Follow Up In Neurology        Martinez Potter is a 74 y.o. year old male here for follow up of tremor.  His tremor has responded excellently to primidone, so not an issue currently. Has longstanding balance issues, for which work up included e,g/ncs which showed peripheral neuropathy, and finding of igM Kappa band for which he saw hematology. Also pituitary adenoma which is an incidental finding for which he is seeing Dr Ceron from Neurosurgery.     Suspect balance disorder is multifactorial - related to OH, neuropathy and even vestibulopathy. He denies improvement from vestibular rehab, rather notes improvement from the supplements he has been taking.   His gait is much improved from prior regardless, so we will just  continue to monitor.     Plan:    Continue Primidone 100 mg at bedtime.   RTC in 12 months    This is a chronic neurologic condition that requires ongoing care and monitoring. This is a complex, serious condition that needs long term care going forward. Between myself and the patient we will be changing direction of care depending on responses to treatment.   Today we discussed medication options, non medication options for management and various other symptoms that are in relation to this disease.  I will continue to be involved in the care of this patient.

## 2025-06-09 NOTE — PATIENT INSTRUCTIONS
It was nice to see you.   I am glad you are doing better now!  Continue Primidone 100 mg at bedtime.   RTC in one year!

## 2025-06-09 NOTE — PROGRESS NOTES
Subjective     Martinez Potter is a right handed  74 y.o. 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 Patient is accompanied by: spouse  Visit type: follow up.     He feels that his gait is better now, he cannot stand and turn real quickly, if he turns he will still loose balance.     He has started a supplement called Claritox Plus, has taken for 4 months and has helped significantly.  Also did vestibular rehab for 2 months and stated that did not help at all.   He denies numbness in his feet. No shuffling. No assistive devices, no falls. If he has to make a sudden turn, he does not feel good still. He is able to work in the yard or garden,  he can do mulching and use a wheel barrel etc  The lightheadedness is better as well.       Tremor:  Tremors are good w primidone.   Have occasional days when it is bad, but for most part it is really good. No apparent rest component. Not noticeably changed with alcohol, but only drinks one glass of wine occasionally.  Tremor is well controlled with current dose of meds.       Balance disorder:  Has bilateral ear ringing but appears to be getting better. He does this sound therapy.   Hearing is good he thinks.    Has had tilt table testing for orthostatic hypotension which was positive. No other falls recently. Endorses some unsteadiness associated with standing up too fast or standing still for too long.     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  In addition, there were findings suggestive of a superimposed chronic left L5 radiculopathy without active denervation. Work up included SPEP/UPEP whch showed monoclonaal IgM [place]. Seen by hematology for same.     Also has a pituitary adenoma for which he follows w Dr Ceron from neurosurgery.     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  Denies any  hypophonia or decreased size of handwriting. No diarrhea or constipation. Does move around a lot in sleep (states since childhood). Not clearly acting out dreams. No significant changes in cognitive function. Endorses frustration with tremor, but denies depression or anxiety. Drinks one cup of coffee a day.    No known family history of tremor. Has two brothers that are not in regular contact. No family history of Parkinson's.    Social Hx: Second hand smoke as child, otherwise no tobacco. Occasional glass of wine, 3x/week. No recreational substance use.    Patient Active Problem List   Diagnosis    Essential tremor    Hypogonadism in male    Vertigo    Acute sinusitis    AK (actinic keratosis)    Benign essential hypertension    Benign prostatic hyperplasia without urinary obstruction    Bilateral leg edema    Bilateral vocal cord paresis    Chest pain    Chronic back pain    Chronic pain syndrome    Colon polyps    Congestion of paranasal sinus    Deviated nasal septum    Diabetes insipidus    Dyspnea on exertion    Elbow pain, right    Epistaxis    Facial pain    Gastroesophageal reflux disease    H/O: stroke    Hoarseness of voice    HTN (hypertension)    Hypopituitarism (Multi)    Panhypopituitarism (Multi)    Pituitary tumor    Secondary hypothyroidism    Laceration of right forearm    Lumbar post-laminectomy syndrome    Lumbar radiculopathy    Lumbosacral spinal stenosis    Lumbosacral spondylosis    Neoplasm of uncertain behavior of skin    Nocturia    Cervical dysphagia    Disorder of pharynx    Dysphagia    Orthostatic hypotension    Pain in right shoulder    Pain in right wrist    Asthma    Persistent cough    Pituitary adenoma (Multi)    Rib pain on right side    Sacroiliitis, not elsewhere classified    Secondary hypocortisolism (Multi)    Sensorineural hearing loss (SNHL) of left ear with unrestricted hearing of right ear    Seronegative rheumatoid arthritis (Multi)    Solar purpura    Syncope and  collapse    Ureteral stone    Vitamin D deficiency    Monoclonal gammopathy present on serum protein electrophoresis    Mixed hyperlipidemia    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)      Past Medical History:   Diagnosis Date    Acute bronchospasm 09/28/2018    Cough due to bronchospasm    Candidal stomatitis 05/28/2018    Thrush    Contusion of right front wall of thorax, initial encounter 03/25/2021    Rib contusion, right, initial encounter    Cramp and spasm 06/18/2021    Muscle cramps    Encounter for other general examination     Rectal exam    Other injury of unspecified body region, initial encounter 08/07/2015    Musculoskeletal strain    Other specified disorders of left ear 01/03/2020    Sensation of fullness in left ear    Other specified disorders of temporomandibular joint 01/29/2020    TMJ inflammation    Other specified symptoms and signs involving the circulatory and respiratory systems 09/28/2018    Chest sounds abnormal on percussion or auscultation    Otitis media, unspecified, left ear 10/04/2019    Acute left otitis media    Personal history of other diseases of male genital organs     History of erectile dysfunction    Personal history of other diseases of the nervous system and sense organs 02/10/2017    History of tinnitus    Personal history of other diseases of the respiratory system 09/28/2018    History of acute sinusitis    Personal history of other diseases of the respiratory system 10/04/2019    History of acute sinusitis    Personal history of other diseases of the respiratory system 12/19/2019    History of paranasal sinus congestion    Personal history of other specified conditions     History of pituitary neoplasm    Personal history of other specified conditions 02/16/2021    History of epistaxis    Personal history of other specified conditions 11/28/2019    History of facial pain    Personal history of other specified conditions 11/28/2019    History of postnasal drip     Personal history of other specified conditions 12/19/2019    History of persistent cough    Personal history of other specified conditions 09/28/2018    History of persistent cough    Personal history of other specified conditions 09/28/2018    History of hoarseness    Personal history of other specified conditions 09/28/2018    History of vertigo    Spinal stenosis, lumbosacral region 10/07/2021    Lumbosacral spinal stenosis    Tinnitus, left ear 01/29/2020    Tinnitus of left ear    Unspecified injury of thorax, initial encounter 03/25/2021    Rib injury      Past Surgical History:   Procedure Laterality Date    OTHER SURGICAL HISTORY  03/13/2018    Excision Of Pituitary Gland Partial    OTHER SURGICAL HISTORY  11/27/2019    Sinus surgery    TONSILLECTOMY  03/13/2018    Tonsillectomy    TOTAL KNEE ARTHROPLASTY  03/13/2018    Knee Replacement      Social History     Socioeconomic History    Marital status:      Spouse name: Not on file    Number of children: Not on file    Years of education: Not on file    Highest education level: Not on file   Occupational History    Not on file   Tobacco Use    Smoking status: Never    Smokeless tobacco: Never   Substance and Sexual Activity    Alcohol use: Yes     Comment: RARELY    Drug use: Never    Sexual activity: Not on file   Other Topics Concern    Not on file   Social History Narrative    Not on file     Social Drivers of Health     Financial Resource Strain: Not on file   Food Insecurity: Not on file   Transportation Needs: Not on file   Physical Activity: Not on file   Stress: Not on file   Social Connections: Not on file   Intimate Partner Violence: Not on file   Housing Stability: Not on file      No family history on file.              Review of Systems  All other system have been reviewed and are negative for complaint.    There were no vitals filed for this visit.    Objective   Neurological Exam    MENTAL STATE: Orientation was normal to person,  place, and time    Cranial nerves: extra ocular movements are intact, no nystagmus.     COORDINATION: Coordination exam was normal. In both upper extremities, finger-nose-finger was intact without dysmetria or overshoot.     Gait: walks narrow based, can tandem!    Movement specific:  Mild bradykinesia w finger taps but no other decrements.   Mild no-no head tremor. No tremor in finger nose, no resting tremor and posture.                Assessment/Plan   Diagnoses and all orders for this visit:  Essential tremor  -     Follow Up In Neurology        Martinez Potter is a 74 y.o. year old male here for follow up of tremor.  His tremor has responded excellently to primidone, so not an issue currently. Has longstanding balance issues, for which work up included e,g/ncs which showed peripheral neuropathy, and finding of igM Kappa band for which he saw hematology. Also pituitary adenoma which is an incidental finding for which he is seeing Dr Ceron from Neurosurgery.     Suspect balance disorder is multifactorial - related to OH, neuropathy and even vestibulopathy. He denies improvement from vestibular rehab, rather notes improvement from the supplements he has been taking.   His gait is much improved from prior regardless, so we will just continue to monitor.     Plan:    Continue Primidone 100 mg at bedtime.   RTC in 12 months    This is a chronic neurologic condition that requires ongoing care and monitoring. This is a complex, serious condition that needs long term care going forward. Between myself and the patient we will be changing direction of care depending on responses to treatment.   Today we discussed medication options, non medication options for management and various other symptoms that are in relation to this disease.  I will continue to be involved in the care of this patient.

## 2025-06-17 ENCOUNTER — APPOINTMENT (OUTPATIENT)
Facility: CLINIC | Age: 75
End: 2025-06-17
Payer: MEDICARE

## 2025-06-17 VITALS
HEIGHT: 68 IN | SYSTOLIC BLOOD PRESSURE: 155 MMHG | WEIGHT: 165 LBS | TEMPERATURE: 96.1 F | BODY MASS INDEX: 25.01 KG/M2 | DIASTOLIC BLOOD PRESSURE: 85 MMHG | HEART RATE: 60 BPM

## 2025-06-17 DIAGNOSIS — E29.1 HYPOGONADISM MALE: ICD-10-CM

## 2025-06-17 DIAGNOSIS — E27.40 ADRENAL INSUFFICIENCY (MULTI): ICD-10-CM

## 2025-06-17 DIAGNOSIS — R35.1 NOCTURIA: ICD-10-CM

## 2025-06-17 DIAGNOSIS — Z51.81 ENCOUNTER FOR MONITORING TESTOSTERONE REPLACEMENT THERAPY: ICD-10-CM

## 2025-06-17 DIAGNOSIS — E23.0 HYPOPITUITARISM (MULTI): Primary | ICD-10-CM

## 2025-06-17 DIAGNOSIS — Z79.890 ENCOUNTER FOR MONITORING TESTOSTERONE REPLACEMENT THERAPY: ICD-10-CM

## 2025-06-17 PROCEDURE — 3077F SYST BP >= 140 MM HG: CPT | Performed by: STUDENT IN AN ORGANIZED HEALTH CARE EDUCATION/TRAINING PROGRAM

## 2025-06-17 PROCEDURE — 3079F DIAST BP 80-89 MM HG: CPT | Performed by: STUDENT IN AN ORGANIZED HEALTH CARE EDUCATION/TRAINING PROGRAM

## 2025-06-17 PROCEDURE — G2211 COMPLEX E/M VISIT ADD ON: HCPCS | Performed by: STUDENT IN AN ORGANIZED HEALTH CARE EDUCATION/TRAINING PROGRAM

## 2025-06-17 PROCEDURE — 99214 OFFICE O/P EST MOD 30 MIN: CPT | Performed by: STUDENT IN AN ORGANIZED HEALTH CARE EDUCATION/TRAINING PROGRAM

## 2025-06-17 PROCEDURE — 1159F MED LIST DOCD IN RCRD: CPT | Performed by: STUDENT IN AN ORGANIZED HEALTH CARE EDUCATION/TRAINING PROGRAM

## 2025-06-17 PROCEDURE — 3008F BODY MASS INDEX DOCD: CPT | Performed by: STUDENT IN AN ORGANIZED HEALTH CARE EDUCATION/TRAINING PROGRAM

## 2025-06-17 NOTE — PROGRESS NOTES
"Subjective   Patient ID: Martinez Potter \"Steven\" is a 74 y.o. male who presents for No chief complaint on file..  HPI  Mr. Potter is a 74 year old M with hx of pituitary tumor s/p resection in 1980s c/b hypopituitarism coming in for management  In the 1980s had peripheral vision loss was seen by ophthalmology and pituitary tumor was found he underwent surgery twice a year apart complicated by hypopituitarism currently replacement therapy for hypogonadism, AI, DI and hypothyroidism  Current meds:  Testosterone 200 mg/ml 0.3 mL every 2 weeks 60mg  Levothyroxine 150 mcg daily. TAkes all meds together  Hydrocortisone 10 mg in am, 7.5 mg at 1 pm and 2.5 mg in the afternoon  DDAVP 0.2 mg in am and 0.2 mg in pm.  Usually does not wake up at night   Was taking salt but now using substitute salt   Taking a herbal medicine helping with gait  Energy: feels tired after 6-7pm days when he active working outside in the yard  Saw Dr. Ceron in feb 2025 Next MRI in July  Headaches improved. No red shot eye  No double vision  No change n peripheral vision  No breast discharge   Uses brown noise to help with ringing in his ears  Saw Dr. Borjas and he reported no compression on exam  Repeat MRI in July 2024   Stable residual tumor located within the sella and suprasellar cistern and in the left cavernous sinus. Tumor again abuts the inferior surface of the optic chiasm and there is stable probable thinning of the optic chiasm.  Saw Dr. Ceron and plan repeat MRI in 1 year  Following with Dr. Borjas stable exam   No extreme thirst  Most times 56 ounces of water  Review of Systems  all pertinent systems reviewed and are otherwise negative   Objective   Physical Exam  Constitutional:       General: He is not in acute distress.     Appearance: Normal appearance.   HENT:      Head: Normocephalic and atraumatic.   Eyes:      Extraocular Movements: Extraocular movements intact.      Pupils: Pupils are equal, round, and reactive to light. "   Cardiovascular:      Rate and Rhythm: Normal rate and regular rhythm.   Pulmonary:      Effort: Pulmonary effort is normal. No respiratory distress.      Breath sounds: Normal breath sounds.   Abdominal:      General: Bowel sounds are normal.      Palpations: Abdomen is soft.      Tenderness: There is no abdominal tenderness.   Skin:     Coloration: Skin is not jaundiced or pale.      Findings: No erythema or rash.   Neurological:      General: No focal deficit present.      Mental Status: He is alert and oriented to person, place, and time.      Deep Tendon Reflexes: Reflexes normal.   Psychiatric:         Mood and Affect: Mood normal.         Behavior: Behavior normal.        Latest Reference Range & Units 09/12/24 16:16 10/29/24 09:34 11/05/24 11:15 02/11/25 13:13   T4, FREE 0.8 - 1.8 ng/dL    1.5   Vitamin D, 25-Hydroxy, Total 30 - 100 ng/mL   41    TESTOSTERONE, TOTAL, MALES (ADULT),  - 827 ng/dL    473   GLUCOSE 74 - 99 mg/dL 82 62 (L) 90    (L): Data is abnormally low    MRI  Narrative & Impression   Interpreted By:  Tim Cheema,   STUDY:  MR SELLA W AND WO IV CONTRAST; ;  7/22/2024 10:56 am      INDICATION:  Signs/Symptoms:Follow up.      COMPARISON:  MRI of the sella from 06/08/2023.      ACCESSION NUMBER(S):  [id number]      ORDERING CLINICIAN:  NICOLE CSISE      TECHNIQUE:  MRI of the sella was performed with the acquisition of coronal T1,  coronal T2, sagittal T2, coronal and sagittal T1 weighted  postcontrast sequences. In addition, volumetric axial T1 post  contrast sequence through the head was performed. Multiplanar  reformats were provided.      Contrast: 14 mL of Dotarem was injected intravenously.      FINDINGS:  There are postoperative changes from prior transsphenoidal approach  for resection of a sellar mass. There is residual enhancing tissue  located within the sella and suprasellar cistern and protruding into  the left cavernous sinus which is most consistent with residual  tumor.      Tumor located within the sella and suprasellar cistern measures 1.5  cm AP x 1.3 cm transverse x 1.7 cm craniocaudal, unchanged. Tumor  continues to abut the pituitary stalk and the inferior border of the  optic chiasm. Question mild thinning of the inferior border of the  optic chiasm. The optic chiasm is overall slightly deviated  inferiorly, could be from prior surgery.      Tumor within the left lateral sella and protruding into the cavernous  sinus is also unchanged and again crosses across the lateral border  of the cavernous segment of the left internal carotid artery. Tumor  within this region measures 1.5 cm AP x 1 cm transverse x 1.7 cm  craniocaudal and is essentially unchanged since prior.      No new abnormal intracranial enhancement or mass.      IMPRESSION:  1. Stable residual tumor located within the sella and suprasellar  cistern and in the left cavernous sinus.      2. Tumor again abuts the inferior surface of the optic chiasm and  there is stable probable thinning of the optic chiasm.      This study was interpreted at [facility].      MACRO:  None      Signed by: Tim Cheema 7/22/2024 12:10 PM  Dictation workstation:   [id number]     Assessment/Plan   Mr. Potter is a 74 year old M with hx of pituitary tumor s/p resection in 1980s c/b hypopituitarism coming in for management  In the 1980s had peripheral vision loss was seen by ophthalmology and pituitary tumor was found he underwent surgery twice a year apart complicated by hypopituitarism currently replacement therapy for hypogonadism, AI, DI and hypothyroidism  Current meds:  Testosterone 200 mg/ml 0.3 mL every 2 weeks 60mg  Levothyroxine 150 mcg daily. TAkes all meds together  Hydrocortisone 10 mg in am, 7.5 mg at 1 pm and 2.5 mg in the afternoon  DDAVP 0.2 mg in am and 0.2 mg in pm.  Repeat MRI in July 2024   Stable residual tumor located within the sella and suprasellar cistern and in the  left cavernous sinus. Tumor again abuts the inferior surface of the optic chiasm and there is stable probable thinning of the optic chiasm.  Taking a herbal medicine helping with gait  Saw Dr. Ceron in feb 2025 Next MRI in July  Saw Dr. Borjas and he reported no compression on exam    Plan:  Renal blood work on monday  Continue desmopressin 0.2 mg twice daily  Continue HC and levothyroxine  If you are working outside take 5 mg instead of 2.5 mg in the afternoon  Continue testosterone  Follow with neurology  MRI in July      C in 6 months             Isabel Faustin MD 06/17/25 10:14 AM

## 2025-06-17 NOTE — PATIENT INSTRUCTIONS
Renal blood work on monday  Continue desmopressin 0.2 mg twice daily  Continue HC and levothyroxine  If you are working outside take 5 mg instead of 2.5 mg in the afternoon  Continue testosterone  Follow with neurology  MRI in July      [facility] in 6 months

## 2025-06-26 LAB
ALBUMIN SERPL-MCNC: 4.1 G/DL (ref 3.6–5.1)
BASOPHILS # BLD AUTO: 41 CELLS/UL (ref 0–200)
BASOPHILS NFR BLD AUTO: 0.6 %
BUN SERPL-MCNC: 12 MG/DL (ref 7–25)
BUN/CREAT SERPL: 19 (CALC) (ref 6–22)
CALCIUM SERPL-MCNC: 8.6 MG/DL (ref 8.6–10.3)
CHLORIDE SERPL-SCNC: 97 MMOL/L (ref 98–110)
CO2 SERPL-SCNC: 26 MMOL/L (ref 20–32)
CREAT SERPL-MCNC: 0.63 MG/DL (ref 0.7–1.28)
EGFRCR SERPLBLD CKD-EPI 2021: 100 ML/MIN/1.73M2
EOSINOPHIL # BLD AUTO: 21 CELLS/UL (ref 15–500)
EOSINOPHIL NFR BLD AUTO: 0.3 %
ERYTHROCYTE [DISTWIDTH] IN BLOOD BY AUTOMATED COUNT: 12.9 % (ref 11–15)
GLUCOSE SERPL-MCNC: 82 MG/DL (ref 65–139)
HCT VFR BLD AUTO: 42.4 % (ref 38.5–50)
HGB BLD-MCNC: 14.3 G/DL (ref 13.2–17.1)
LYMPHOCYTES # BLD AUTO: 1007 CELLS/UL (ref 850–3900)
LYMPHOCYTES NFR BLD AUTO: 14.6 %
MCH RBC QN AUTO: 34.3 PG (ref 27–33)
MCHC RBC AUTO-ENTMCNC: 33.7 G/DL (ref 32–36)
MCV RBC AUTO: 101.7 FL (ref 80–100)
MONOCYTES # BLD AUTO: 938 CELLS/UL (ref 200–950)
MONOCYTES NFR BLD AUTO: 13.6 %
NEUTROPHILS # BLD AUTO: 4892 CELLS/UL (ref 1500–7800)
NEUTROPHILS NFR BLD AUTO: 70.9 %
PHOSPHATE SERPL-MCNC: 3.9 MG/DL (ref 2.1–4.3)
PLATELET # BLD AUTO: 285 THOUSAND/UL (ref 140–400)
PMV BLD REES-ECKER: 8.4 FL (ref 7.5–12.5)
POTASSIUM SERPL-SCNC: 4.6 MMOL/L (ref 3.5–5.3)
PSA SERPL-MCNC: 2.51 NG/ML
RBC # BLD AUTO: 4.17 MILLION/UL (ref 4.2–5.8)
SODIUM SERPL-SCNC: 132 MMOL/L (ref 135–146)
T4 FREE SERPL-MCNC: 1.9 NG/DL (ref 0.8–1.8)
TESTOST FREE SERPL-MCNC: 44.2 PG/ML (ref 30–135)
TESTOST SERPL-MCNC: 340 NG/DL (ref 250–1100)
WBC # BLD AUTO: 6.9 THOUSAND/UL (ref 3.8–10.8)

## 2025-07-05 DIAGNOSIS — M06.00 RHEUMATOID ARTHRITIS WITH NEGATIVE RHEUMATOID FACTOR, INVOLVING UNSPECIFIED SITE (MULTI): ICD-10-CM

## 2025-07-07 DIAGNOSIS — E23.0 HYPOPITUITARISM (MULTI): ICD-10-CM

## 2025-07-08 RX ORDER — METHOTREXATE 25 MG/ML
INJECTION INTRA-ARTERIAL; INTRAMUSCULAR; INTRATHECAL; INTRAVENOUS
Qty: 4 ML | Refills: 3 | Status: SHIPPED | OUTPATIENT
Start: 2025-07-08

## 2025-07-14 ENCOUNTER — HOSPITAL ENCOUNTER (OUTPATIENT)
Dept: RADIOLOGY | Facility: CLINIC | Age: 75
Discharge: HOME | End: 2025-07-14
Payer: MEDICARE

## 2025-07-14 DIAGNOSIS — D49.7 PITUITARY TUMOR: ICD-10-CM

## 2025-07-14 PROCEDURE — 70553 MRI BRAIN STEM W/O & W/DYE: CPT

## 2025-07-14 PROCEDURE — 2550000001 HC RX 255 CONTRASTS: Performed by: NEUROLOGICAL SURGERY

## 2025-07-14 PROCEDURE — 70553 MRI BRAIN STEM W/O & W/DYE: CPT | Performed by: RADIOLOGY

## 2025-07-14 PROCEDURE — A9575 INJ GADOTERATE MEGLUMI 0.1ML: HCPCS | Performed by: NEUROLOGICAL SURGERY

## 2025-07-14 RX ORDER — GADOTERATE MEGLUMINE 376.9 MG/ML
15 INJECTION INTRAVENOUS
Status: COMPLETED | OUTPATIENT
Start: 2025-07-14 | End: 2025-07-14

## 2025-07-14 RX ADMIN — GADOTERATE MEGLUMINE 15 ML: 376.9 INJECTION INTRAVENOUS at 09:28

## 2025-07-22 ENCOUNTER — APPOINTMENT (OUTPATIENT)
Facility: CLINIC | Age: 75
End: 2025-07-22
Payer: MEDICARE

## 2025-07-29 RX ORDER — BUDESONIDE AND FORMOTEROL FUMARATE DIHYDRATE 160; 4.5 UG/1; UG/1
2 AEROSOL RESPIRATORY (INHALATION) 2 TIMES DAILY
Qty: 3 EACH | Refills: 1 | Status: SHIPPED | OUTPATIENT
Start: 2025-07-29

## 2025-08-09 ENCOUNTER — OFFICE VISIT (OUTPATIENT)
Dept: URGENT CARE | Age: 75
End: 2025-08-09
Payer: MEDICARE

## 2025-08-09 VITALS
HEART RATE: 62 BPM | DIASTOLIC BLOOD PRESSURE: 85 MMHG | SYSTOLIC BLOOD PRESSURE: 160 MMHG | TEMPERATURE: 97.7 F | BODY MASS INDEX: 24.71 KG/M2 | WEIGHT: 163 LBS | OXYGEN SATURATION: 98 % | HEIGHT: 68 IN | RESPIRATION RATE: 20 BRPM

## 2025-08-09 DIAGNOSIS — L02.415 CELLULITIS AND ABSCESS OF RIGHT LOWER EXTREMITY: Primary | ICD-10-CM

## 2025-08-09 DIAGNOSIS — L03.115 CELLULITIS AND ABSCESS OF RIGHT LOWER EXTREMITY: Primary | ICD-10-CM

## 2025-08-09 PROCEDURE — 99213 OFFICE O/P EST LOW 20 MIN: CPT | Performed by: FAMILY MEDICINE

## 2025-08-09 PROCEDURE — 1159F MED LIST DOCD IN RCRD: CPT | Performed by: FAMILY MEDICINE

## 2025-08-09 PROCEDURE — 3008F BODY MASS INDEX DOCD: CPT | Performed by: FAMILY MEDICINE

## 2025-08-09 PROCEDURE — 3079F DIAST BP 80-89 MM HG: CPT | Performed by: FAMILY MEDICINE

## 2025-08-09 PROCEDURE — 3077F SYST BP >= 140 MM HG: CPT | Performed by: FAMILY MEDICINE

## 2025-08-09 PROCEDURE — 1036F TOBACCO NON-USER: CPT | Performed by: FAMILY MEDICINE

## 2025-08-09 RX ORDER — MUPIROCIN 20 MG/G
OINTMENT TOPICAL 2 TIMES DAILY
Qty: 22 G | Refills: 0 | Status: SHIPPED | OUTPATIENT
Start: 2025-08-09 | End: 2025-08-16

## 2025-08-09 RX ORDER — CEPHALEXIN 500 MG/1
500 CAPSULE ORAL 3 TIMES DAILY
Qty: 30 CAPSULE | Refills: 0 | Status: SHIPPED | OUTPATIENT
Start: 2025-08-09 | End: 2025-08-19

## 2025-08-09 NOTE — PROGRESS NOTES
"Subjective   Patient ID: Martinez Potter \"Steven\" is a 74 y.o. male. They present today with a chief complaint of Wound Care (Walk into cement pavers and cut leg. Happened 5 days ago).    History of Present Illness  HPI  Patient presents 5 days after scraping his right lower leg on outdoor landscape pavers.  He states that he has been applying Vaseline and gauze to it but that he feels it may becoming infected as there are areas of redness that are increasing.  He denies fevers or chills.  He states that he has some pain and a burning sensation in the lower part of the wound.  He is able to move his foot and leg without difficulty and is walking normal  Past Medical History  Allergies as of 08/09/2025 - Reviewed 08/09/2025   Allergen Reaction Noted    Pollen extracts Other 03/21/2024       Prescriptions Prior to Admission[1]     Medical History[2]    Surgical History[3]     reports that he has never smoked. He has never used smokeless tobacco. He reports current alcohol use. He reports that he does not use drugs.    Review of Systems  Review of Systems     As in history of present illness                          Objective    Vitals:    08/09/25 0834   BP: 160/85   Pulse: 62   Resp: 20   Temp: 36.5 °C (97.7 °F)   TempSrc: Oral   SpO2: 98%   Weight: 73.9 kg (163 lb)   Height: 1.727 m (5' 8\")     No LMP for male patient.    Physical Exam  General: Vitals noted, no distress. Afebrile.   Vascular: Extremity warm and dry with good peripheral pulses noted  Extremities: No peripheral edema. Negative Homans bilaterally, no cords.  Patient has normal movement in flexion and extension of his right foot ankle and toes  Skin: A large partially scabbed erythematous wound extending from the top of his anterior right lower leg to above his ankle is noted with surrounding erythema tenderness.  No fluctuance.  No bleeding or purulent discharge noted.  Remaining skin exam  Neuro: No focal neurologic deficits, NIH score of " 0.    Procedures    Point of Care Test & Imaging Results from this visit  No results found for this visit on 08/09/25.   Imaging  No results found.    Cardiology, Vascular, and Other Imaging  No other imaging results found for the past 2 days      Diagnostic study results (if any) were reviewed by Ja Taylor MD.    Assessment/Plan   Allergies, medications, history, and pertinent labs/EKGs/Imaging reviewed by Ja Taylor MD.     Medical Decision Making  At time of discharge patient was clinically well-appearing and HDS for outpatient management. The patient and/or family was educated regarding diagnosis, supportive care, OTC and Rx medications. The patient and/or family was given the opportunity to ask questions prior to discharge.  They verbalized understanding of my discussion of the plans for treatment, expected course, indications to return to  or seek further evaluation in ED, and the need for timely follow up as directed.   They were provided with a work/school excuse if requested.    Orders and Diagnoses  Diagnoses and all orders for this visit:  Cellulitis and abscess of right lower extremity      Medical Admin Record      Patient disposition: Home    Electronically signed by Ja Taylor MD  8:47 AM           [1] (Not in a hospital admission)   [2]   Past Medical History:  Diagnosis Date    Acute bronchospasm 09/28/2018    Cough due to bronchospasm    Candidal stomatitis 05/28/2018    Thrush    Contusion of right front wall of thorax, initial encounter 03/25/2021    Rib contusion, right, initial encounter    Cramp and spasm 06/18/2021    Muscle cramps    Encounter for other general examination     Rectal exam    Other injury of unspecified body region, initial encounter 08/07/2015    Musculoskeletal strain    Other specified disorders of left ear 01/03/2020    Sensation of fullness in left ear    Other specified disorders of temporomandibular joint 01/29/2020    TMJ inflammation    Other specified  symptoms and signs involving the circulatory and respiratory systems 09/28/2018    Chest sounds abnormal on percussion or auscultation    Otitis media, unspecified, left ear 10/04/2019    Acute left otitis media    Personal history of other diseases of male genital organs     History of erectile dysfunction    Personal history of other diseases of the nervous system and sense organs 02/10/2017    History of tinnitus    Personal history of other diseases of the respiratory system 09/28/2018    History of acute sinusitis    Personal history of other diseases of the respiratory system 10/04/2019    History of acute sinusitis    Personal history of other diseases of the respiratory system 12/19/2019    History of paranasal sinus congestion    Personal history of other specified conditions     History of pituitary neoplasm    Personal history of other specified conditions 02/16/2021    History of epistaxis    Personal history of other specified conditions 11/28/2019    History of facial pain    Personal history of other specified conditions 11/28/2019    History of postnasal drip    Personal history of other specified conditions 12/19/2019    History of persistent cough    Personal history of other specified conditions 09/28/2018    History of persistent cough    Personal history of other specified conditions 09/28/2018    History of hoarseness    Personal history of other specified conditions 09/28/2018    History of vertigo    Spinal stenosis, lumbosacral region 10/07/2021    Lumbosacral spinal stenosis    Tinnitus, left ear 01/29/2020    Tinnitus of left ear    Unspecified injury of thorax, initial encounter 03/25/2021    Rib injury   [3]   Past Surgical History:  Procedure Laterality Date    OTHER SURGICAL HISTORY  03/13/2018    Excision Of Pituitary Gland Partial    OTHER SURGICAL HISTORY  11/27/2019    Sinus surgery    TONSILLECTOMY  03/13/2018    Tonsillectomy    TOTAL KNEE ARTHROPLASTY  03/13/2018    Knee  Replacement

## 2025-08-09 NOTE — PATIENT INSTRUCTIONS
Clean area with gentle soap and water twice a day and pat dry  After cleaning, apply mupirocin ointment to wound twice a day for 7 days  Take oral antibiotic 3 times a day with food for the next 10 days until completed  Tylenol as needed for discomfort  Go to ER for fevers or increasing pain and swelling  See PCP in 5 to 7 days if no improvement

## 2025-08-20 DIAGNOSIS — M19.90 INFLAMMATORY ARTHRITIS: ICD-10-CM

## 2025-08-20 RX ORDER — HYDROXYCHLOROQUINE SULFATE 200 MG/1
TABLET, FILM COATED ORAL DAILY
Qty: 60 TABLET | Refills: 2 | Status: SHIPPED | OUTPATIENT
Start: 2025-08-20

## 2025-08-28 ENCOUNTER — APPOINTMENT (OUTPATIENT)
Facility: CLINIC | Age: 75
End: 2025-08-28
Payer: MEDICARE

## 2025-09-22 ENCOUNTER — APPOINTMENT (OUTPATIENT)
Facility: CLINIC | Age: 75
End: 2025-09-22
Payer: MEDICARE

## 2026-01-22 ENCOUNTER — APPOINTMENT (OUTPATIENT)
Dept: ENDOCRINOLOGY | Facility: CLINIC | Age: 76
End: 2026-01-22
Payer: MEDICARE

## 2026-02-20 ENCOUNTER — APPOINTMENT (OUTPATIENT)
Dept: OPHTHALMOLOGY | Facility: CLINIC | Age: 76
End: 2026-02-20
Payer: MEDICARE